# Patient Record
Sex: FEMALE | Race: WHITE | NOT HISPANIC OR LATINO
[De-identification: names, ages, dates, MRNs, and addresses within clinical notes are randomized per-mention and may not be internally consistent; named-entity substitution may affect disease eponyms.]

---

## 2017-05-16 ENCOUNTER — OTHER (OUTPATIENT)
Age: 82
End: 2017-05-16

## 2017-08-02 ENCOUNTER — APPOINTMENT (OUTPATIENT)
Dept: CV DIAGNOSITCS | Facility: HOSPITAL | Age: 82
End: 2017-08-02

## 2017-08-02 ENCOUNTER — APPOINTMENT (OUTPATIENT)
Dept: CARDIOLOGY | Facility: CLINIC | Age: 82
End: 2017-08-02

## 2019-11-19 ENCOUNTER — INPATIENT (INPATIENT)
Facility: HOSPITAL | Age: 84
LOS: 5 days | Discharge: NURSING, REHAB OR LONG-TERM CARE | DRG: 813 | End: 2019-11-25
Attending: INTERNAL MEDICINE | Admitting: INTERNAL MEDICINE
Payer: MEDICARE

## 2019-11-19 VITALS
TEMPERATURE: 98 F | OXYGEN SATURATION: 97 % | WEIGHT: 145.95 LBS | RESPIRATION RATE: 18 BRPM | HEART RATE: 126 BPM | SYSTOLIC BLOOD PRESSURE: 143 MMHG | DIASTOLIC BLOOD PRESSURE: 90 MMHG

## 2019-11-19 LAB
ALBUMIN SERPL ELPH-MCNC: 4.1 G/DL — SIGNIFICANT CHANGE UP (ref 3.3–5)
ALP SERPL-CCNC: 81 U/L — SIGNIFICANT CHANGE UP (ref 40–120)
ALT FLD-CCNC: 25 U/L — SIGNIFICANT CHANGE UP (ref 10–45)
ANION GAP SERPL CALC-SCNC: 11 MMOL/L — SIGNIFICANT CHANGE UP (ref 5–17)
APPEARANCE UR: CLEAR — SIGNIFICANT CHANGE UP
AST SERPL-CCNC: 25 U/L — SIGNIFICANT CHANGE UP (ref 10–40)
BACTERIA # UR AUTO: NEGATIVE — SIGNIFICANT CHANGE UP
BASOPHILS # BLD AUTO: 0.04 K/UL — SIGNIFICANT CHANGE UP (ref 0–0.2)
BASOPHILS NFR BLD AUTO: 0.3 % — SIGNIFICANT CHANGE UP (ref 0–2)
BILIRUB SERPL-MCNC: 0.8 MG/DL — SIGNIFICANT CHANGE UP (ref 0.2–1.2)
BILIRUB UR-MCNC: NEGATIVE — SIGNIFICANT CHANGE UP
BLD GP AB SCN SERPL QL: NEGATIVE — SIGNIFICANT CHANGE UP
BUN SERPL-MCNC: 19 MG/DL — SIGNIFICANT CHANGE UP (ref 7–23)
CALCIUM SERPL-MCNC: 9.7 MG/DL — SIGNIFICANT CHANGE UP (ref 8.4–10.5)
CHLORIDE SERPL-SCNC: 102 MMOL/L — SIGNIFICANT CHANGE UP (ref 96–108)
CO2 SERPL-SCNC: 23 MMOL/L — SIGNIFICANT CHANGE UP (ref 22–31)
COLOR SPEC: YELLOW — SIGNIFICANT CHANGE UP
CREAT SERPL-MCNC: 0.75 MG/DL — SIGNIFICANT CHANGE UP (ref 0.5–1.3)
DIFF PNL FLD: ABNORMAL
EOSINOPHIL # BLD AUTO: 0.19 K/UL — SIGNIFICANT CHANGE UP (ref 0–0.5)
EOSINOPHIL NFR BLD AUTO: 1.4 % — SIGNIFICANT CHANGE UP (ref 0–6)
EPI CELLS # UR: 1 /HPF — SIGNIFICANT CHANGE UP
GAS PNL BLDV: SIGNIFICANT CHANGE UP
GLUCOSE SERPL-MCNC: 295 MG/DL — HIGH (ref 70–99)
GLUCOSE UR QL: ABNORMAL
HCT VFR BLD CALC: 40.3 % — SIGNIFICANT CHANGE UP (ref 34.5–45)
HGB BLD-MCNC: 12.5 G/DL — SIGNIFICANT CHANGE UP (ref 11.5–15.5)
HYALINE CASTS # UR AUTO: 1 /LPF — SIGNIFICANT CHANGE UP (ref 0–2)
IMM GRANULOCYTES NFR BLD AUTO: 0.7 % — SIGNIFICANT CHANGE UP (ref 0–1.5)
KETONES UR-MCNC: NEGATIVE — SIGNIFICANT CHANGE UP
LEUKOCYTE ESTERASE UR-ACNC: NEGATIVE — SIGNIFICANT CHANGE UP
LYMPHOCYTES # BLD AUTO: 1.88 K/UL — SIGNIFICANT CHANGE UP (ref 1–3.3)
LYMPHOCYTES # BLD AUTO: 13.9 % — SIGNIFICANT CHANGE UP (ref 13–44)
MCHC RBC-ENTMCNC: 27.2 PG — SIGNIFICANT CHANGE UP (ref 27–34)
MCHC RBC-ENTMCNC: 31 GM/DL — LOW (ref 32–36)
MCV RBC AUTO: 87.8 FL — SIGNIFICANT CHANGE UP (ref 80–100)
MONOCYTES # BLD AUTO: 0.74 K/UL — SIGNIFICANT CHANGE UP (ref 0–0.9)
MONOCYTES NFR BLD AUTO: 5.5 % — SIGNIFICANT CHANGE UP (ref 2–14)
NEUTROPHILS # BLD AUTO: 10.62 K/UL — HIGH (ref 1.8–7.4)
NEUTROPHILS NFR BLD AUTO: 78.2 % — HIGH (ref 43–77)
NITRITE UR-MCNC: NEGATIVE — SIGNIFICANT CHANGE UP
NRBC # BLD: 0 /100 WBCS — SIGNIFICANT CHANGE UP (ref 0–0)
PH UR: 5.5 — SIGNIFICANT CHANGE UP (ref 5–8)
PLATELET # BLD AUTO: 239 K/UL — SIGNIFICANT CHANGE UP (ref 150–400)
POTASSIUM SERPL-MCNC: 4.5 MMOL/L — SIGNIFICANT CHANGE UP (ref 3.5–5.3)
POTASSIUM SERPL-SCNC: 4.5 MMOL/L — SIGNIFICANT CHANGE UP (ref 3.5–5.3)
PROT SERPL-MCNC: 7.2 G/DL — SIGNIFICANT CHANGE UP (ref 6–8.3)
PROT UR-MCNC: ABNORMAL
RBC # BLD: 4.59 M/UL — SIGNIFICANT CHANGE UP (ref 3.8–5.2)
RBC # FLD: 14.6 % — HIGH (ref 10.3–14.5)
RBC CASTS # UR COMP ASSIST: 1 /HPF — SIGNIFICANT CHANGE UP (ref 0–4)
RH IG SCN BLD-IMP: POSITIVE — SIGNIFICANT CHANGE UP
SODIUM SERPL-SCNC: 136 MMOL/L — SIGNIFICANT CHANGE UP (ref 135–145)
SP GR SPEC: 1.02 — SIGNIFICANT CHANGE UP (ref 1.01–1.02)
UROBILINOGEN FLD QL: NEGATIVE — SIGNIFICANT CHANGE UP
WBC # BLD: 13.56 K/UL — HIGH (ref 3.8–10.5)
WBC # FLD AUTO: 13.56 K/UL — HIGH (ref 3.8–10.5)
WBC UR QL: 0 /HPF — SIGNIFICANT CHANGE UP (ref 0–5)

## 2019-11-19 PROCEDURE — 70450 CT HEAD/BRAIN W/O DYE: CPT | Mod: 26

## 2019-11-19 PROCEDURE — 71045 X-RAY EXAM CHEST 1 VIEW: CPT | Mod: 26

## 2019-11-19 PROCEDURE — 74177 CT ABD & PELVIS W/CONTRAST: CPT | Mod: 26

## 2019-11-19 PROCEDURE — 99285 EMERGENCY DEPT VISIT HI MDM: CPT | Mod: GC

## 2019-11-19 RX ORDER — SODIUM CHLORIDE 9 MG/ML
1000 INJECTION INTRAMUSCULAR; INTRAVENOUS; SUBCUTANEOUS ONCE
Refills: 0 | Status: COMPLETED | OUTPATIENT
Start: 2019-11-19 | End: 2019-11-19

## 2019-11-19 RX ADMIN — SODIUM CHLORIDE 1000 MILLILITER(S): 9 INJECTION INTRAMUSCULAR; INTRAVENOUS; SUBCUTANEOUS at 21:37

## 2019-11-19 NOTE — ED ADULT NURSE NOTE - NSIMPLEMENTINTERV_GEN_ALL_ED
Implemented All Fall with Harm Risk Interventions:  Railroad to call system. Call bell, personal items and telephone within reach. Instruct patient to call for assistance. Room bathroom lighting operational. Non-slip footwear when patient is off stretcher. Physically safe environment: no spills, clutter or unnecessary equipment. Stretcher in lowest position, wheels locked, appropriate side rails in place. Provide visual cue, wrist band, yellow gown, etc. Monitor gait and stability. Monitor for mental status changes and reorient to person, place, and time. Review medications for side effects contributing to fall risk. Reinforce activity limits and safety measures with patient and family. Provide visual clues: red socks.

## 2019-11-19 NOTE — ED PROVIDER NOTE - CLINICAL SUMMARY MEDICAL DECISION MAKING FREE TEXT BOX
Dr. Polk Note: generalized weakness, fall, ab pain, check ct ab r/o bleed, r/o ICH, metabolic screen for weakness.

## 2019-11-19 NOTE — ED PROVIDER NOTE - PHYSICAL EXAMINATION
Al RODRIGUEZ MD PGY2:   PHYSICAL EXAM:    GENERAL: NAD, well-developed  HEENT:  Atraumatic, Normocephalic  CHEST/LUNG: Chest rise equal bilaterally  HEART: Regular rate and rhythm  ABDOMEN: Lower abdominal TTP LLQ maximally.   EXTREMITIES:  2+ Peripheral Pulses.  PSYCH: A&Ox3  SKIN: No obvious rashes or lesions

## 2019-11-19 NOTE — ED PROVIDER NOTE - ATTENDING CONTRIBUTION TO CARE
Pt here with family with generalized weakness and ab pain, also fell, on a/c.  Appears elderly, nontoxic, clear lungs, minimal tenderness lower abdomen.  Nonfocal neuro exam.

## 2019-11-19 NOTE — ED ADULT NURSE NOTE - OBJECTIVE STATEMENT
89 YO female PMHx A-fib on eliquis, HTN, c/o generalized weakness X1 week and rectal bleeding. Patient reports that she went to her PCP for gross amounts of blood in BM's as reported by aid. Patient reports that she has been feeling as thought her knees have been buckling and reports decreased ambulation. Of note, patient had fall last week, does not recall head trauma or LOC but did stay on floor for 1 day until neighbor found her. Patient is A&OX3, airway patent, breathing spontaneous, equal and symmetric chest rise and fall. skin warm, dry and pink. denies chest pain, SOB, HA, N/V/D, abdominal pain, fever/chills, urinary symptoms, hematuria, weakness, dizziness, numbness, tinging. Peripheral pulses present b/l. Skin warm, dry and pink. Pt placed in position of comfort. Pt educated on call bell system and provided call bell. Bed in lowest position, wheels locked, appropriate side rails raised. Pt denies needs at this time.

## 2019-11-19 NOTE — ED PROVIDER NOTE - OBJECTIVE STATEMENT
Al RODRIGUEZ MD PGY2: 89 F PMH Afib eliquis here for generalized weakness x 1 week with positive FOBT at PCP office today; after mechanical fall last week where she was on the ground for a day and a half and was cleaned up by a neighbour. No infectious sxs. No head trauma during that fall. No LOC. Was in PCP's office today for 2 episodes of grossly bloody bowel movements and has low H&H at baseline. Patient feels weak and does not feel safe at home.       Meds: eliquis 5 BID   metoprolol 100 am 50 pm   omeprazole   glipizide 5 BID

## 2019-11-19 NOTE — ED PROVIDER NOTE - PROGRESS NOTE DETAILS
Sign out follow-up: CTH/CTCS no acute process plan was to dc home on Abx for UTI. Daughter not answering phone. Spoke to brother and sister in law who elderly and unable to come to hospital tonight. Will try to reach family in [place]. Pt's niece calls back. Pt lives alone with health aid a few hours per week. Ambulates with walker. Came to ED mainly for blood in stool (stool dark and fecal occult + in PCP's office). Pt will need admission to w/u GIB (HD stable in ED o/n). TAO Attending note (Sunny): patient admitted to unattached service as no callback from PCP after several attempts to reach.  remains hemodynamically stable.  per information from signout, not c/w sepsis.

## 2019-11-19 NOTE — ED PROVIDER NOTE - SEVERE SEPSIS ALERT DETAILS
Attending note (Sunny): At this time, a diagnosis of sepsis is not supported by the overall clinical picture.

## 2019-11-20 DIAGNOSIS — K92.2 GASTROINTESTINAL HEMORRHAGE, UNSPECIFIED: ICD-10-CM

## 2019-11-20 DIAGNOSIS — E11.9 TYPE 2 DIABETES MELLITUS WITHOUT COMPLICATIONS: ICD-10-CM

## 2019-11-20 DIAGNOSIS — I48.91 UNSPECIFIED ATRIAL FIBRILLATION: ICD-10-CM

## 2019-11-20 LAB
CK SERPL-CCNC: 22 U/L — LOW (ref 25–170)
GLUCOSE BLDC GLUCOMTR-MCNC: 189 MG/DL — HIGH (ref 70–99)
GLUCOSE BLDC GLUCOMTR-MCNC: 270 MG/DL — HIGH (ref 70–99)
HCT VFR BLD CALC: 39.4 % — SIGNIFICANT CHANGE UP (ref 34.5–45)
HGB BLD-MCNC: 12.1 G/DL — SIGNIFICANT CHANGE UP (ref 11.5–15.5)
MCHC RBC-ENTMCNC: 27.3 PG — SIGNIFICANT CHANGE UP (ref 27–34)
MCHC RBC-ENTMCNC: 30.7 GM/DL — LOW (ref 32–36)
MCV RBC AUTO: 88.9 FL — SIGNIFICANT CHANGE UP (ref 80–100)
PLATELET # BLD AUTO: 225 K/UL — SIGNIFICANT CHANGE UP (ref 150–400)
RBC # BLD: 4.43 M/UL — SIGNIFICANT CHANGE UP (ref 3.8–5.2)
RBC # FLD: 14.6 % — HIGH (ref 10.3–14.5)
TROPONIN T, HIGH SENSITIVITY RESULT: 24 NG/L — SIGNIFICANT CHANGE UP (ref 0–51)
WBC # BLD: 10.83 K/UL — HIGH (ref 3.8–10.5)
WBC # FLD AUTO: 10.83 K/UL — HIGH (ref 3.8–10.5)

## 2019-11-20 RX ORDER — INFLUENZA VIRUS VACCINE 15; 15; 15; 15 UG/.5ML; UG/.5ML; UG/.5ML; UG/.5ML
0.5 SUSPENSION INTRAMUSCULAR ONCE
Refills: 0 | Status: COMPLETED | OUTPATIENT
Start: 2019-11-20 | End: 2019-11-20

## 2019-11-20 RX ORDER — GLUCAGON INJECTION, SOLUTION 0.5 MG/.1ML
1 INJECTION, SOLUTION SUBCUTANEOUS ONCE
Refills: 0 | Status: DISCONTINUED | OUTPATIENT
Start: 2019-11-20 | End: 2019-11-25

## 2019-11-20 RX ORDER — METOPROLOL TARTRATE 50 MG
25 TABLET ORAL THREE TIMES A DAY
Refills: 0 | Status: DISCONTINUED | OUTPATIENT
Start: 2019-11-20 | End: 2019-11-23

## 2019-11-20 RX ORDER — PANTOPRAZOLE SODIUM 20 MG/1
40 TABLET, DELAYED RELEASE ORAL
Refills: 0 | Status: DISCONTINUED | OUTPATIENT
Start: 2019-11-20 | End: 2019-11-25

## 2019-11-20 RX ORDER — ACETAMINOPHEN 500 MG
650 TABLET ORAL ONCE
Refills: 0 | Status: COMPLETED | OUTPATIENT
Start: 2019-11-20 | End: 2019-11-20

## 2019-11-20 RX ORDER — CEFTRIAXONE 500 MG/1
1000 INJECTION, POWDER, FOR SOLUTION INTRAMUSCULAR; INTRAVENOUS ONCE
Refills: 0 | Status: COMPLETED | OUTPATIENT
Start: 2019-11-20 | End: 2019-11-20

## 2019-11-20 RX ORDER — DEXTROSE 50 % IN WATER 50 %
25 SYRINGE (ML) INTRAVENOUS ONCE
Refills: 0 | Status: DISCONTINUED | OUTPATIENT
Start: 2019-11-20 | End: 2019-11-25

## 2019-11-20 RX ORDER — SODIUM CHLORIDE 9 MG/ML
1000 INJECTION, SOLUTION INTRAVENOUS
Refills: 0 | Status: DISCONTINUED | OUTPATIENT
Start: 2019-11-20 | End: 2019-11-25

## 2019-11-20 RX ORDER — DEXTROSE 50 % IN WATER 50 %
15 SYRINGE (ML) INTRAVENOUS ONCE
Refills: 0 | Status: DISCONTINUED | OUTPATIENT
Start: 2019-11-20 | End: 2019-11-25

## 2019-11-20 RX ORDER — DEXTROSE 50 % IN WATER 50 %
12.5 SYRINGE (ML) INTRAVENOUS ONCE
Refills: 0 | Status: DISCONTINUED | OUTPATIENT
Start: 2019-11-20 | End: 2019-11-25

## 2019-11-20 RX ORDER — INSULIN LISPRO 100/ML
VIAL (ML) SUBCUTANEOUS
Refills: 0 | Status: DISCONTINUED | OUTPATIENT
Start: 2019-11-20 | End: 2019-11-25

## 2019-11-20 RX ORDER — PANTOPRAZOLE SODIUM 20 MG/1
40 TABLET, DELAYED RELEASE ORAL ONCE
Refills: 0 | Status: COMPLETED | OUTPATIENT
Start: 2019-11-20 | End: 2019-11-20

## 2019-11-20 RX ORDER — INSULIN LISPRO 100/ML
VIAL (ML) SUBCUTANEOUS AT BEDTIME
Refills: 0 | Status: DISCONTINUED | OUTPATIENT
Start: 2019-11-20 | End: 2019-11-25

## 2019-11-20 RX ADMIN — Medication 25 MILLIGRAM(S): at 21:25

## 2019-11-20 RX ADMIN — Medication 3: at 18:02

## 2019-11-20 RX ADMIN — Medication 650 MILLIGRAM(S): at 16:01

## 2019-11-20 RX ADMIN — PANTOPRAZOLE SODIUM 40 MILLIGRAM(S): 20 TABLET, DELAYED RELEASE ORAL at 07:00

## 2019-11-20 RX ADMIN — CEFTRIAXONE 100 MILLIGRAM(S): 500 INJECTION, POWDER, FOR SOLUTION INTRAMUSCULAR; INTRAVENOUS at 07:01

## 2019-11-20 RX ADMIN — Medication 650 MILLIGRAM(S): at 16:30

## 2019-11-20 NOTE — H&P ADULT - NSHPLABSRESULTS_GEN_ALL_CORE
Lab Results:  CBC  CBC Full  -  ( 2019 20:57 )  WBC Count : 13.56 K/uL  RBC Count : 4.59 M/uL  Hemoglobin : 12.5 g/dL  Hematocrit : 40.3 %  Platelet Count - Automated : 239 K/uL  Mean Cell Volume : 87.8 fl  Mean Cell Hemoglobin : 27.2 pg  Mean Cell Hemoglobin Concentration : 31.0 gm/dL  Auto Neutrophil # : 10.62 K/uL  Auto Lymphocyte # : 1.88 K/uL  Auto Monocyte # : 0.74 K/uL  Auto Eosinophil # : 0.19 K/uL  Auto Basophil # : 0.04 K/uL  Auto Neutrophil % : 78.2 %  Auto Lymphocyte % : 13.9 %  Auto Monocyte % : 5.5 %  Auto Eosinophil % : 1.4 %  Auto Basophil % : 0.3 %    .		Differential:	[] Automated		[] Manual  Chemistry                        12.5   13.56 )-----------( 239      ( 2019 20:57 )             40.3     11-    136  |  102  |  19  ----------------------------<  295<H>  4.5   |  23  |  0.75    Ca    9.7      2019 20:57    TPro  7.2  /  Alb  4.1  /  TBili  0.8  /  DBili  x   /  AST  25  /  ALT  25  /  AlkPhos  81  11-19    LIVER FUNCTIONS - ( 2019 20:57 )  Alb: 4.1 g/dL / Pro: 7.2 g/dL / ALK PHOS: 81 U/L / ALT: 25 U/L / AST: 25 U/L / GGT: x             Urinalysis Basic - ( 2019 21:31 )    Color: Yellow / Appearance: Clear / S.024 / pH: x  Gluc: x / Ketone: Negative  / Bili: Negative / Urobili: Negative   Blood: x / Protein: 30 mg/dL / Nitrite: Negative   Leuk Esterase: Negative / RBC: 1 /hpf / WBC 0 /HPF   Sq Epi: x / Non Sq Epi: 1 /hpf / Bacteria: Negative            MICROBIOLOGY/CULTURES:      RADIOLOGY RESULTS: reviewed

## 2019-11-20 NOTE — CONSULT NOTE ADULT - ASSESSMENT
afib   Hr a bit elevated   will resume low dose BB  a/c on hold given GIB    HTN  stable    GIB  plan for EGD  cv stable to proceed   Monitor hemoglobin, transfuse as needed.

## 2019-11-20 NOTE — H&P ADULT - ASSESSMENT
89 F PMH Afib eliquis here for generalized weakness x 1 week with positive FOBT at PCP office today; after mechanical fall last week where she was on the ground for a day and a half and was cleaned up by a neighbour. No infectious sxs. No head trauma during that fall. No LOC. Was in PCP's office today for 2 episodes of grossly bloody bowel movements and has low H&H at baseline. Patient feels weak and does not feel safe at home.

## 2019-11-20 NOTE — ED ADULT NURSE REASSESSMENT NOTE - NS ED NURSE REASSESS COMMENT FT1
Patient a&ox3, no acute distress, resp nonlabored, resting comfortably in stretcher. Denies headache, dizziness, chest pain, palpitations, SOB, abd pain, N/V/D, urinary symptoms, fevers, chills, weakness at this time. Patient awaiting social work in AM. Pt placed in position of comfort. Pt educated on call bell system and provided call bell. Non-skid socks on, bed in lowest position, wheels locked, appropriate side rails raised. Pt denies needs at this time.

## 2019-11-20 NOTE — CONSULT NOTE ADULT - SUBJECTIVE AND OBJECTIVE BOX
[facility]        Patient is a 89y old  Female who presents with a chief complaint of weakness (2019 17:25)    Excerpt from H&P,  89 F PMH Afkatelyn anayeli here for generalized weakness x 1 week with positive FOBT at PCP office today; after mechanical fall last week where she was on the ground for a day and a half and was cleaned up by a neighbour. No infectious sxs. No head trauma during that fall. No LOC. Was in PCP's office today for 2 episodes of grossly bloody bowel movements and has low H&H at baseline. Patient feels weak and does not feel safe at home.           *****PAST MEDICAL / Surgical  HISTORY:  PAST MEDICAL & SURGICAL HISTORY:  Coronary artery disease  Stented coronary artery  Atrial fibrillation  Diabetes  No significant past surgical history           *****FAMILY HISTORY:  FAMILY HISTORY:  No pertinent family history in first degree relatives           *****SOCIAL HISTORY:  Alcohol: None  Smoking: None  walks with a walker.       *****ALLERGIES:   Allergies    No Known Allergies    Intolerances             *****MEDICATIONS: current medication reviewed and documented.   MEDICATIONS  (STANDING):  dextrose 5%. 1000 milliLiter(s) (50 mL/Hr) IV Continuous <Continuous>  dextrose 50% Injectable 12.5 Gram(s) IV Push once  dextrose 50% Injectable 25 Gram(s) IV Push once  dextrose 50% Injectable 25 Gram(s) IV Push once  influenza   Vaccine 0.5 milliLiter(s) IntraMuscular once  insulin lispro (HumaLOG) corrective regimen sliding scale   SubCutaneous at bedtime  insulin lispro (HumaLOG) corrective regimen sliding scale   SubCutaneous three times a day before meals  metoprolol tartrate 25 milliGRAM(s) Oral three times a day  pantoprazole    Tablet 40 milliGRAM(s) Oral before breakfast    MEDICATIONS  (PRN):  dextrose 40% Gel 15 Gram(s) Oral once PRN Blood Glucose LESS THAN 70 milliGRAM(s)/deciliter  glucagon  Injectable 1 milliGRAM(s) IntraMuscular once PRN Glucose LESS THAN 70 milligrams/deciliter           *****REVIEW OF SYSTEM:  GEN: no fever, no chills, no pain  RESP: no SOB, no cough, no sputum  CVS: no chest pain, no palpitations, no edema  GI: no abdominal pain, no nausea, no vomiting, no constipation, no diarrhea  : no dysurea, no frequency, no hematurea  Neuro: no headache, no dizziness  PSYCH: no anxiety, no depression  Derm : no itching, no rash         *****VITAL SIGNS:  T(C): 37 (19 @ 21:21), Max: 37 (19 @ 21:21)  HR: 96 (19 @ 21:21) (87 - 111)  BP: 123/87 (19 @ 21:21) (111/88 - 156/82)  RR: 18 (19 @ 21:21) (16 - 18)  SpO2: 98% (19 @ 21:21) (95% - 99%)  Wt(kg): --           *****PHYSICAL EXAM:   Alert oriented x 2  Attention comprehension are fair. Able to name, repeat, without any difficulty.   Able to follow 1-2 step commands.     EOMI fundi not visualized,  VFF to confrontration  No facial asymmetry   Tongue is midline   Palate elevates symmetrically   Moving both upper ext symmetrically antigravity   b/l Iliopsoas 2/5   hamstrings 3/5 quad 3/5  dorsiflexion 3/5   patchy areas of numbness.  Gait : not assessed.  B/L down going toes               *****LAB AND IMAGIN.1   10.83 )-----------( 225      ( 2019 18:21 )             39.4               11-    136  |  102  |  19  ----------------------------<  295<H>  4.5   |  23  |  0.75    Ca    9.7      2019 20:57    TPro  7.2  /  Alb  4.1  /  TBili  0.8  /  DBili  x   /  AST  25  /  ALT  25  /  AlkPhos  81  11-19                CARDIAC MARKERS ( 2019 16:21 )  x     / x     / 22 U/L / x     / x                  Urinalysis Basic - ( 2019 21:31 )    Color: Yellow / Appearance: Clear / S.024 / pH: x  Gluc: x / Ketone: Negative  / Bili: Negative / Urobili: Negative   Blood: x / Protein: 30 mg/dL / Nitrite: Negative   Leuk Esterase: Negative / RBC: 1 /hpf / WBC 0 /HPF   Sq Epi: x / Non Sq Epi: 1 /hpf / Bacteria: Negative  < from: CT Head No Cont (11.19.19 @ 22:15) >  There is no acute intracranial hemorrhage, mass effect, midline shift,   extra-axial collection, hydrocephalus, or evidence of acute vascular   territorial infarction. Mild patchy hypodensities within the   periventricular and subcortical white matter, although nonspecific,   likely reflect chronic microvascular disease. Cerebral volume loss   results in prominence of the ventricles and sulci.    The visualized paranasal sinuses and mastoid air cells are clear.   Hyperostosis frontalis interna. No calvarial fracture.    IMPRESSION:     No acute intracranial hemorrhage or calvarial fracture.      < end of copied text >        [All pertinent recent Imaging reports reviewed]         *****A S S E S S M E N T   A N D   P L A N :89 F PMH Afib eliquis here for generalized weakness x 1 week with positive FOBT at PCP office today; after mechanical fall last week where she was on the ground for a day and a half and was cleaned up by a neighbour. No infectious sxs. No head trauma during that fall. No LOC. Was in PCP's office today for 2 episodes of grossly bloody bowel movements and has low H&H at baseline. Patient feels weak and does not feel safe at home.        Problem/Recommendations 1:  fall mechanical vs. progressive deconditioning vs. microvascular disease   ct head c/w mild microvascular disease   pt eval may benefit from short term rehab   home safety eval  social work consult for home health aide   recommend alert bracelet     Problem/Recommendations 2:afib on eliquis with GIB   defer to cardio to eval need for ac	       ___________________________  Will follow with you.  Thank you,  Luana Frederick MD  Diplomate of the American Board of Neurology and Psychiatry.  Diplomate of the American Board of Vascular Neurology.   [facility]   Ph: [phone]    Differential diagnosis and plan of care discussed with patient after the evaluation.   Advanced care planning options discussed.   Pain assessed and judicious use of narcotics when appropriate was discussed.  Importance of Fall prevention discussed.  Counseling on Smoking and Alcohol cessation was offered when appropriate.  Counseling on Diet, exercise, and medication compliance was done.     123 minutes spent on the total encounter;  more than 50 % of the visit was spent on counseling  and or coordinating care by the attending physician.    Thank you for allowing me to participate in the care of this chelly patient. Please do not hesitate to call me if you have any questions.     This and subsequent notes were partially created using voice recognition software and will  inherently be subject to errors including those of syntax and sound alike substitutions which may escape proofreading. In such instances original meaning may be extrapolated by contextual derivation.

## 2019-11-20 NOTE — CONSULT NOTE ADULT - SUBJECTIVE AND OBJECTIVE BOX
[facility]  Jared Melvin PA-C  [address] [place] [id number]  [phone]      Chief Complaint:  Patient is a 89y old  Female who presents with a chief complaint of weakness (2019 12:37)      HPI:89 F PMH Afib eliquis here for generalized weakness x 1 week with positive FOBT at PCP office today; after mechanical fall last week where she was on the ground for a day and a half and was cleaned up by a neighbour. No infectious sxs. No head trauma during that fall. No LOC. Was in PCP's office today for 2 episodes of grossly bloody bowel movements and has low H&H at baseline. Patient feels weak and does not feel safe at home.     Allergies:  No Known Allergies      Medications:  acetaminophen   Tablet .. 650 milliGRAM(s) Oral once  dextrose 40% Gel 15 Gram(s) Oral once PRN  dextrose 5%. 1000 milliLiter(s) IV Continuous <Continuous>  dextrose 50% Injectable 12.5 Gram(s) IV Push once  dextrose 50% Injectable 25 Gram(s) IV Push once  dextrose 50% Injectable 25 Gram(s) IV Push once  glucagon  Injectable 1 milliGRAM(s) IntraMuscular once PRN  insulin lispro (HumaLOG) corrective regimen sliding scale   SubCutaneous three times a day before meals  pantoprazole    Tablet 40 milliGRAM(s) Oral before breakfast      PMHX/PSHX:  Coronary artery disease  Stented coronary artery  Atrial fibrillation  Diabetes  No significant past surgical history      Family history:  No pertinent family history in first degree relatives      Social History:     ROS:     General:  No wt loss, fevers, chills, night sweats, fatigue,   Eyes:  Good vision, no reported pain  ENT:  No sore throat, pain, runny nose, dysphagia  CV:  No pain, palpitations, hypo/hypertension  Resp:  No dyspnea, cough, tachypnea, wheezing  GI:  No pain, No nausea, No vomiting, No diarrhea, No constipation, No weight loss, No fever, No pruritis, No rectal bleeding, + tarry stools, No dysphagia,  :  No pain, bleeding, incontinence, nocturia  Muscle:  No pain, weakness  Neuro:  No weakness, tingling, memory problems  Psych:  No fatigue, insomnia, mood problems, depression  Endocrine:  No polyuria, polydipsia, cold/heat intolerance  Heme:  No petechiae, ecchymosis, easy bruisability  Skin:  No rash, tattoos, scars, edema      PHYSICAL EXAM:   Vital Signs:  Vital Signs Last 24 Hrs  T(C): 36.6 (2019 14:40), Max: 36.9 (2019 07:15)  T(F): 97.9 (2019 14:40), Max: 98.5 (2019 07:15)  HR: 111 (2019 14:40) (87 - 126)  BP: 111/88 (2019 14:40) (111/88 - 156/82)  BP(mean): --  RR: 16 (2019 14:40) (16 - 18)  SpO2: 98% (2019 14:40) (95% - 99%)  Daily     Daily     GENERAL:  Appears stated age, well-groomed, well-nourished, no distress  HEENT:  NC/AT,  conjunctivae clear and pink, no thyromegaly, nodules, adenopathy, no JVD, sclera -anicteric  CHEST:  Full & symmetric excursion, no increased effort, breath sounds clear  HEART:  Regular rhythm, S1, S2, no murmur/rub/S3/S4, no abdominal bruit, no edema  ABDOMEN:  Soft, non-tender, non-distended, normoactive bowel sounds,  no masses ,no hepato-splenomegaly, no signs of chronic liver disease  EXTEREMITIES:  no cyanosis,clubbing or edema  SKIN:  No rash/erythema/ecchymoses/petechiae/wounds/abscess/warm/dry  NEURO:  Alert, oriented, no asterixis, no tremor, no encephalopathy    LABS:                        12.5   13.56 )-----------( 239      ( 2019 20:57 )             40.3         136  |  102  |  19  ----------------------------<  295<H>  4.5   |  23  |  0.75    Ca    9.7      2019 20:57    TPro  7.2  /  Alb  4.1  /  TBili  0.8  /  DBili  x   /  AST  25  /  ALT  25  /  AlkPhos  81  11-19    LIVER FUNCTIONS - ( 2019 20:57 )  Alb: 4.1 g/dL / Pro: 7.2 g/dL / ALK PHOS: 81 U/L / ALT: 25 U/L / AST: 25 U/L / GGT: x             Urinalysis Basic - ( 2019 21:31 )    Color: Yellow / Appearance: Clear / S.024 / pH: x  Gluc: x / Ketone: Negative  / Bili: Negative / Urobili: Negative   Blood: x / Protein: 30 mg/dL / Nitrite: Negative   Leuk Esterase: Negative / RBC: 1 /hpf / WBC 0 /HPF   Sq Epi: x / Non Sq Epi: 1 /hpf / Bacteria: Negative          Imaging:

## 2019-11-20 NOTE — H&P ADULT - NSICDXPASTMEDICALHX_GEN_ALL_CORE_FT
PAST MEDICAL HISTORY:  Atrial fibrillation     Coronary artery disease     Diabetes     Stented coronary artery

## 2019-11-20 NOTE — CONSULT NOTE ADULT - SUBJECTIVE AND OBJECTIVE BOX
CHIEF COMPLAINT:Patient is a 89y old  Female who presents with a chief complaint of weakness (20 Nov 2019 15:13)      HISTORY OF PRESENT ILLNESS:      89 year old female with afib on Eliquis , weakness   found to have pos FOBT, melena   planned for EGD   denies any chest pain or sob     PAST MEDICAL & SURGICAL HISTORY:  Coronary artery disease  Stented coronary artery  Atrial fibrillation  Diabetes  No significant past surgical history          MEDICATIONS:          pantoprazole    Tablet 40 milliGRAM(s) Oral before breakfast    dextrose 40% Gel 15 Gram(s) Oral once PRN  dextrose 50% Injectable 12.5 Gram(s) IV Push once  dextrose 50% Injectable 25 Gram(s) IV Push once  dextrose 50% Injectable 25 Gram(s) IV Push once  glucagon  Injectable 1 milliGRAM(s) IntraMuscular once PRN  insulin lispro (HumaLOG) corrective regimen sliding scale   SubCutaneous three times a day before meals    dextrose 5%. 1000 milliLiter(s) IV Continuous <Continuous>      FAMILY HISTORY:  No pertinent family history in first degree relatives      Non-contributory    SOCIAL HISTORY:    No tobacco, drugs or etoh    Allergies    No Known Allergies    Intolerances    	    REVIEW OF SYSTEMS:  as above  The rest of the 14 points ROS reviewed and except above they are unremarkable.        PHYSICAL EXAM:  T(C): 36.6 (11-20-19 @ 14:40), Max: 36.9 (11-20-19 @ 07:15)  HR: 111 (11-20-19 @ 14:40) (87 - 126)  BP: 111/88 (11-20-19 @ 14:40) (111/88 - 156/82)  RR: 16 (11-20-19 @ 14:40) (16 - 18)  SpO2: 98% (11-20-19 @ 14:40) (95% - 99%)  Wt(kg): --  I&O's Summary    JVP: Normal  Neck: supple  Lung: clear   CV: S1 S2 , Murmur:  Abd: soft  Ext: No edema  neuro: Awake / alert  Psych: flat affect  Skin: normal      LABS/DATA:    TELEMETRY: 	    ECG:  	afib    	  CARDIAC MARKERS:                        24 <<== 11-20-19 @ 16:21                              12.5   13.56 )-----------( 239      ( 19 Nov 2019 20:57 )             40.3     11-19    136  |  102  |  19  ----------------------------<  295<H>  4.5   |  23  |  0.75    Ca    9.7      19 Nov 2019 20:57    TPro  7.2  /  Alb  4.1  /  TBili  0.8  /  DBili  x   /  AST  25  /  ALT  25  /  AlkPhos  81  11-19    proBNP:   Lipid Profile:   HgA1c:   TSH:

## 2019-11-20 NOTE — H&P ADULT - NSHPPHYSICALEXAM_GEN_ALL_CORE
General: WN/WD NAD  PERRLA  Neurology: A&Ox3, nonfocal, AG x 4  Respiratory: CTA B/L  CV: RRR, S1S2, no murmurs, rubs or gallops  Abdominal: Soft, NT, ND +BS, Last BM  Extremities: No edema, + peripheral pulses  Skin Normal

## 2019-11-20 NOTE — CONSULT NOTE ADULT - ASSESSMENT
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 marginal ulcer  hold a/c  iv proton pump inhibitor  plan for  upper gastrointestinal endoscopy in am  npo p mn  d/w family  cardiology eval pending

## 2019-11-20 NOTE — ED ADULT NURSE REASSESSMENT NOTE - NS ED NURSE REASSESS COMMENT FT1
vss; pt without complaints; pt still in street clothes; changed into hospital gown; diaper changed; comfort measures taken; pt appears to be slurring words; unknown if baseline; safety/comfort maintained vss; pt without complaints; pt still in street clothes; changed into hospital gown; diaper changed; comfort measures taken; pt concerned about being discharged; adv pt being admitted; safety/comfort maintained

## 2019-11-21 ENCOUNTER — TRANSCRIPTION ENCOUNTER (OUTPATIENT)
Age: 84
End: 2019-11-21

## 2019-11-21 ENCOUNTER — RESULT REVIEW (OUTPATIENT)
Age: 84
End: 2019-11-21

## 2019-11-21 LAB
ANION GAP SERPL CALC-SCNC: 13 MMOL/L — SIGNIFICANT CHANGE UP (ref 5–17)
BUN SERPL-MCNC: 18 MG/DL — SIGNIFICANT CHANGE UP (ref 7–23)
CALCIUM SERPL-MCNC: 8.9 MG/DL — SIGNIFICANT CHANGE UP (ref 8.4–10.5)
CHLORIDE SERPL-SCNC: 104 MMOL/L — SIGNIFICANT CHANGE UP (ref 96–108)
CO2 SERPL-SCNC: 24 MMOL/L — SIGNIFICANT CHANGE UP (ref 22–31)
CREAT SERPL-MCNC: 0.7 MG/DL — SIGNIFICANT CHANGE UP (ref 0.5–1.3)
GLUCOSE BLDC GLUCOMTR-MCNC: 135 MG/DL — HIGH (ref 70–99)
GLUCOSE BLDC GLUCOMTR-MCNC: 152 MG/DL — HIGH (ref 70–99)
GLUCOSE BLDC GLUCOMTR-MCNC: 186 MG/DL — HIGH (ref 70–99)
GLUCOSE BLDC GLUCOMTR-MCNC: 209 MG/DL — HIGH (ref 70–99)
GLUCOSE SERPL-MCNC: 188 MG/DL — HIGH (ref 70–99)
HBA1C BLD-MCNC: 8.2 % — HIGH (ref 4–5.6)
HCT VFR BLD CALC: 35.7 % — SIGNIFICANT CHANGE UP (ref 34.5–45)
HCT VFR BLD CALC: 36.2 % — SIGNIFICANT CHANGE UP (ref 34.5–45)
HCT VFR BLD CALC: 39.7 % — SIGNIFICANT CHANGE UP (ref 34.5–45)
HGB BLD-MCNC: 11.2 G/DL — LOW (ref 11.5–15.5)
HGB BLD-MCNC: 11.4 G/DL — LOW (ref 11.5–15.5)
HGB BLD-MCNC: 12.2 G/DL — SIGNIFICANT CHANGE UP (ref 11.5–15.5)
MCHC RBC-ENTMCNC: 27 PG — SIGNIFICANT CHANGE UP (ref 27–34)
MCHC RBC-ENTMCNC: 27.5 PG — SIGNIFICANT CHANGE UP (ref 27–34)
MCHC RBC-ENTMCNC: 27.9 PG — SIGNIFICANT CHANGE UP (ref 27–34)
MCHC RBC-ENTMCNC: 30.7 GM/DL — LOW (ref 32–36)
MCHC RBC-ENTMCNC: 30.9 GM/DL — LOW (ref 32–36)
MCHC RBC-ENTMCNC: 31.9 GM/DL — LOW (ref 32–36)
MCV RBC AUTO: 87.3 FL — SIGNIFICANT CHANGE UP (ref 80–100)
MCV RBC AUTO: 87.8 FL — SIGNIFICANT CHANGE UP (ref 80–100)
MCV RBC AUTO: 88.9 FL — SIGNIFICANT CHANGE UP (ref 80–100)
NRBC # BLD: 0 /100 WBCS — SIGNIFICANT CHANGE UP (ref 0–0)
NRBC # BLD: 0 /100 WBCS — SIGNIFICANT CHANGE UP (ref 0–0)
PLATELET # BLD AUTO: 193 K/UL — SIGNIFICANT CHANGE UP (ref 150–400)
PLATELET # BLD AUTO: 209 K/UL — SIGNIFICANT CHANGE UP (ref 150–400)
PLATELET # BLD AUTO: 233 K/UL — SIGNIFICANT CHANGE UP (ref 150–400)
POTASSIUM SERPL-MCNC: 4.1 MMOL/L — SIGNIFICANT CHANGE UP (ref 3.5–5.3)
POTASSIUM SERPL-SCNC: 4.1 MMOL/L — SIGNIFICANT CHANGE UP (ref 3.5–5.3)
RBC # BLD: 4.07 M/UL — SIGNIFICANT CHANGE UP (ref 3.8–5.2)
RBC # BLD: 4.09 M/UL — SIGNIFICANT CHANGE UP (ref 3.8–5.2)
RBC # BLD: 4.52 M/UL — SIGNIFICANT CHANGE UP (ref 3.8–5.2)
RBC # FLD: 14.6 % — HIGH (ref 10.3–14.5)
RBC # FLD: 14.6 % — HIGH (ref 10.3–14.5)
RBC # FLD: 14.7 % — HIGH (ref 10.3–14.5)
SODIUM SERPL-SCNC: 141 MMOL/L — SIGNIFICANT CHANGE UP (ref 135–145)
WBC # BLD: 8.29 K/UL — SIGNIFICANT CHANGE UP (ref 3.8–10.5)
WBC # BLD: 8.39 K/UL — SIGNIFICANT CHANGE UP (ref 3.8–10.5)
WBC # BLD: 9.69 K/UL — SIGNIFICANT CHANGE UP (ref 3.8–10.5)
WBC # FLD AUTO: 8.29 K/UL — SIGNIFICANT CHANGE UP (ref 3.8–10.5)
WBC # FLD AUTO: 8.39 K/UL — SIGNIFICANT CHANGE UP (ref 3.8–10.5)
WBC # FLD AUTO: 9.69 K/UL — SIGNIFICANT CHANGE UP (ref 3.8–10.5)

## 2019-11-21 PROCEDURE — 88312 SPECIAL STAINS GROUP 1: CPT | Mod: 26

## 2019-11-21 PROCEDURE — 88305 TISSUE EXAM BY PATHOLOGIST: CPT | Mod: 26

## 2019-11-21 RX ORDER — APIXABAN 2.5 MG/1
5 TABLET, FILM COATED ORAL
Refills: 0 | Status: DISCONTINUED | OUTPATIENT
Start: 2019-11-22 | End: 2019-11-25

## 2019-11-21 RX ORDER — SUCRALFATE 1 G
1 TABLET ORAL
Refills: 0 | Status: DISCONTINUED | OUTPATIENT
Start: 2019-11-21 | End: 2019-11-25

## 2019-11-21 RX ADMIN — Medication 25 MILLIGRAM(S): at 05:57

## 2019-11-21 RX ADMIN — Medication 1 GRAM(S): at 18:43

## 2019-11-21 RX ADMIN — Medication 25 MILLIGRAM(S): at 14:16

## 2019-11-21 RX ADMIN — Medication 25 MILLIGRAM(S): at 22:37

## 2019-11-21 NOTE — PROGRESS NOTE ADULT - SUBJECTIVE AND OBJECTIVE BOX
Subjective: Patient seen and examined. No new events except as noted.     SUBJECTIVE/ROS:  feels better today   No chest pain, dyspnea, palpitation, or dizziness      MEDICATIONS:  MEDICATIONS  (STANDING):  dextrose 5%. 1000 milliLiter(s) (50 mL/Hr) IV Continuous <Continuous>  dextrose 50% Injectable 12.5 Gram(s) IV Push once  dextrose 50% Injectable 25 Gram(s) IV Push once  dextrose 50% Injectable 25 Gram(s) IV Push once  influenza   Vaccine 0.5 milliLiter(s) IntraMuscular once  insulin lispro (HumaLOG) corrective regimen sliding scale   SubCutaneous at bedtime  insulin lispro (HumaLOG) corrective regimen sliding scale   SubCutaneous three times a day before meals  metoprolol tartrate 25 milliGRAM(s) Oral three times a day  pantoprazole    Tablet 40 milliGRAM(s) Oral before breakfast      PHYSICAL EXAM:  T(C): 36.5 (11-21-19 @ 05:54), Max: 37 (11-20-19 @ 21:21)  HR: 90 (11-21-19 @ 05:54) (90 - 111)  BP: 127/71 (11-21-19 @ 05:54) (111/88 - 138/82)  RR: 18 (11-21-19 @ 05:54) (16 - 18)  SpO2: 96% (11-21-19 @ 05:54) (96% - 98%)  Wt(kg): --  I&O's Summary          JVP: Normal  Neck: supple  Lung: clear   CV: S1 S2 , Murmur:  Abd: soft  Ext: No edema  neuro: Awake / alert  Psych: flat affect  Skin: normal``    LABS/DATA:    CARDIAC MARKERS:  CARDIAC MARKERS ( 20 Nov 2019 16:21 )  x     / x     / 22 U/L / x     / x                                    11.2   8.29  )-----------( 209      ( 21 Nov 2019 08:29 )             36.2     11-21    141  |  104  |  18  ----------------------------<  188<H>  4.1   |  24  |  0.70    Ca    8.9      21 Nov 2019 04:32    TPro  7.2  /  Alb  4.1  /  TBili  0.8  /  DBili  x   /  AST  25  /  ALT  25  /  AlkPhos  81  11-19    proBNP:   Lipid Profile:   HgA1c:   TSH:     TELE:  EKG:

## 2019-11-21 NOTE — DISCHARGE NOTE PROVIDER - CARE PROVIDER_API CALL
Jovan Duran (DO)  Gastroenterology; Internal Medicine  [address] [place] [address]  Phone: [phone]  Fax: [phone]  Follow Up Time: 2 weeks    Matt Gerber (MD)  Internal Medicine  [id number] [place] [address]  Phone: [phone]  Fax: [phone]  Follow Up Time: Routine

## 2019-11-21 NOTE — CHART NOTE - NSCHARTNOTEFT_GEN_A_CORE
interval events    patient s/p GIB, S/P EGD today       EGD results/GI recommendation  Findings:       The examined esophagus was normal.       Diffuse moderately erythematous mucosa was found in the entire examined stomach. Biopsies        were taken with a cold forceps for histology.       Evidence of Billroth II anatomy       Normal afferent and efferent limb       normal anastomosis                                                                                                        Impression:          - Normal esophagus.                       - Erythematous mucosa in the stomach. Biopsied.  Recommendation:      - Return patient to [facility] for [facility] care.                       - Cont PPI                       - Carafate bid                       - f/u path                       - no objection to resume a/c                       - reg diet    Vital Signs Last 24 Hrs  T(C): 36.8 (21 Nov 2019 20:30), Max: 37 (20 Nov 2019 21:21)  T(F): 98.2 (21 Nov 2019 20:30), Max: 98.6 (20 Nov 2019 21:21)  HR: 108 (21 Nov 2019 20:30) (82 - 108)  BP: 131/75 (21 Nov 2019 20:30) (122/70 - 151/86)  BP(mean): --  RR: 20 (21 Nov 2019 20:30) (18 - 20)  SpO2: 96% (21 Nov 2019 20:30) (95% - 98%)                          12.2   9.69  )-----------( 233      ( 21 Nov 2019 12:18 )             39.7                                                                                                           89 F PMH Afib Eliquis here for generalized weakness x 1 week with positive FOBT at PCP office today; after mechanical fall last week where she was on the ground for a day and a half and was cleaned up by a neighbor. No infectious sxs. No head trauma during that fall. No LOC. Was in PCP's office today for 2 episodes of grossly bloody bowel movements and has low H&H at baseline. Patient feels weak and does not feel safe at home.   Patient admitted with GIB, S/P EGD today with results as above.  Patient HD stable  CBC stable  GI cleared the patient to resume AC  Discussed with  cardiology DR Morris and Attending ( patient s/p GIB, hx of fall, EGd results)  Cardiology and Attending cleared the patient to resume Eliquis 5mg PO bid from tomorrow  AC resumed after agrred by Cardiology, GI, and Attending  Discussed with patient  Monitor am CBC  Will follow    Justo Thayer [facility] [place]  [id number]

## 2019-11-21 NOTE — DISCHARGE NOTE PROVIDER - NSDCCPCAREPLAN_GEN_ALL_CORE_FT
PRINCIPAL DISCHARGE DIAGNOSIS  Diagnosis: GI bleed  Assessment and Plan of Treatment: seen by GI  EGD  eliquis on hold PRINCIPAL DISCHARGE DIAGNOSIS  Diagnosis: GI bleed  Assessment and Plan of Treatment: seen by GI  EGD  had  upper gastrointestinal endoscopy 11/21 with normal esophagus  - Erythematous mucosa in the stomach. Biopsied  - Continue omeprazole  - Carafate twice daily  diet as tolerated  eliquis resumed with no bleeding; hemoglobin stable  monitor for any bleeding      SECONDARY DISCHARGE DIAGNOSES  Diagnosis: Atrial fibrillation  Assessment and Plan of Treatment: Atrial fibrillation  continue home medication  monitor for bleeding    Diagnosis: Diabetes  Assessment and Plan of Treatment: Diabetes  Type 2 diabetes  hemoglobin A1c 8.2  continue diabetic diet  continue diabetic medication

## 2019-11-21 NOTE — PROGRESS NOTE ADULT - ASSESSMENT
Called patient to discuss need to have stress test completed. Patient understands and will compete prior to cardiac MRI.       Scheduled for   5/13/19 arrival time 12:15pm  5/14/19 arrival time 1:30 pm         89 F PMH Afib eliquis here for generalized weakness x 1 week with positive FOBT at PCP office today; after mechanical fall last week where she was on the ground for a day and a half and was cleaned up by a neighbour. No infectious sxs. No head trauma during that fall. No LOC. Was in PCP's office today for 2 episodes of grossly bloody bowel movements and has low H&H at baseline. Patient feels weak and does not feel safe at home.     Problem/Plan - 1:  ·  Problem: GI bleed.  Plan: monitor cbc  GI fu   cw protonix  will monitor.     Problem/Plan - 2:  ·  Problem: Atrial fibrillation.  Plan: hold DOVC  cards to follow  pt is a poor candidate for AC sec to fall risk.     Problem/Plan - 3:  ·  Problem: Diabetes.  Plan: monitor FS  ISS.

## 2019-11-21 NOTE — PROGRESS NOTE ADULT - ASSESSMENT
afib   Hr stable   cont low dose BB  a/c on hold given GIB    HTN  stable    GIB  plan for EGD  cv stable to proceed   Monitor hemoglobin, transfuse as needed.

## 2019-11-21 NOTE — PROGRESS NOTE ADULT - SUBJECTIVE AND OBJECTIVE BOX
Pre-Endoscopy Evaluation      Referring Physician: dr. raymundo lowery                                 Procedure:  upper gastrointestinal endoscopy     Indication for Procedure: gib    Sedation by Anesthesia [X]    PAST MEDICAL & SURGICAL HISTORY:  Coronary artery disease  Stented coronary artery  Atrial fibrillation  Diabetes  No significant past surgical history      PMH of Gastroparesis [ ]  Gastric Surgery [X]  Gastric Outlet Obstruction [ ]    Allergies:    No Known Allergies    Intolerances:    Latex allergy: [ ] yes [X] no    Medications:MEDICATIONS  (STANDING):  dextrose 5%. 1000 milliLiter(s) (50 mL/Hr) IV Continuous <Continuous>  dextrose 50% Injectable 12.5 Gram(s) IV Push once  dextrose 50% Injectable 25 Gram(s) IV Push once  dextrose 50% Injectable 25 Gram(s) IV Push once  influenza   Vaccine 0.5 milliLiter(s) IntraMuscular once  insulin lispro (HumaLOG) corrective regimen sliding scale   SubCutaneous at bedtime  insulin lispro (HumaLOG) corrective regimen sliding scale   SubCutaneous three times a day before meals  metoprolol tartrate 25 milliGRAM(s) Oral three times a day  pantoprazole    Tablet 40 milliGRAM(s) Oral before breakfast    MEDICATIONS  (PRN):  dextrose 40% Gel 15 Gram(s) Oral once PRN Blood Glucose LESS THAN 70 milliGRAM(s)/deciliter  glucagon  Injectable 1 milliGRAM(s) IntraMuscular once PRN Glucose LESS THAN 70 milligrams/deciliter      Smoking: [ ] yes  [X] no    AICD/PPM: [ ] yes   [X] no    Pertinent lab data:                        12.2   9.69  )-----------( 233      ( 21 Nov 2019 12:18 )             39.7     11-21    141  |  104  |  18  ----------------------------<  188<H>  4.1   |  24  |  0.70    Ca    8.9      21 Nov 2019 04:32    TPro  7.2  /  Alb  4.1  /  TBili  0.8  /  DBili  x   /  AST  25  /  ALT  25  /  AlkPhos  81  11-19      CARDIAC MARKERS ( 20 Nov 2019 16:21 )  x     / x     / 22 U/L / x     / x          CAPILLARY BLOOD GLUCOSE  POCT Blood Glucose.: 135 mg/dL (21 Nov 2019 14:01)      Physical Examination:      Daily   Vital Signs Last 24 Hrs  T(C): 36.9 (21 Nov 2019 11:22), Max: 37 (20 Nov 2019 21:21)  T(F): 98.5 (21 Nov 2019 11:22), Max: 98.6 (20 Nov 2019 21:21)  HR: 82 (21 Nov 2019 11:22) (82 - 96)  BP: 128/73 (21 Nov 2019 11:22) (122/70 - 128/73)  BP(mean): --  RR: 18 (21 Nov 2019 11:22) (18 - 18)  SpO2: 96% (21 Nov 2019 11:22) (96% - 98%)    Drug Dosing Weight    Weight (kg): 66.2 (19 Nov 2019 18:22)      Constitutional: NAD     Neck:  No JVD    Respiratory: CTAB/L    Cardiovascular: S1 and S2    Gastrointestinal: BS+, soft, NT/ND    Extremities: No peripheral edema    Neurological: A/O x 3    : No Godwin    Skin: No rashes    Comments:      The patient is a suitable candidate for the planned procedure unless box checked [ ]  No, explain:

## 2019-11-21 NOTE — PROGRESS NOTE ADULT - SUBJECTIVE AND OBJECTIVE BOX
Patient is a 89y old  Female who presents with a chief complaint of weakness (2019 16:23)      INTERVAL HPI/OVERNIGHT EVENTS:  T(C): 36.7 (19 @ 17:30), Max: 37 (19 @ 21:21)  HR: 100 (19 @ 17:30) (82 - 100)  BP: 151/86 (19 @ 17:30) (122/70 - 151/86)  RR: 18 (19 @ 17:30) (18 - 18)  SpO2: 95% (19 @ 17:30) (95% - 98%)  Wt(kg): --  I&O's Summary    2019 07:01  -  2019 19:32  --------------------------------------------------------  IN: 0 mL / OUT: 1 mL / NET: -1 mL        LABS:                        12.2   9.69  )-----------( 233      ( 2019 12:18 )             39.7     11-21    141  |  104  |  18  ----------------------------<  188<H>  4.1   |  24  |  0.70    Ca    8.9      2019 04:32    TPro  7.2  /  Alb  4.1  /  TBili  0.8  /  DBili  x   /  AST  25  /  ALT  25  /  AlkPhos  81  11-19      Urinalysis Basic - ( 2019 21:31 )    Color: Yellow / Appearance: Clear / S.024 / pH: x  Gluc: x / Ketone: Negative  / Bili: Negative / Urobili: Negative   Blood: x / Protein: 30 mg/dL / Nitrite: Negative   Leuk Esterase: Negative / RBC: 1 /hpf / WBC 0 /HPF   Sq Epi: x / Non Sq Epi: 1 /hpf / Bacteria: Negative      CAPILLARY BLOOD GLUCOSE      POCT Blood Glucose.: 152 mg/dL (2019 19:00)  POCT Blood Glucose.: 135 mg/dL (2019 14:01)  POCT Blood Glucose.: 186 mg/dL (2019 06:10)  POCT Blood Glucose.: 189 mg/dL (2019 22:33)        Urinalysis Basic - ( 2019 21:31 )    Color: Yellow / Appearance: Clear / S.024 / pH: x  Gluc: x / Ketone: Negative  / Bili: Negative / Urobili: Negative   Blood: x / Protein: 30 mg/dL / Nitrite: Negative   Leuk Esterase: Negative / RBC: 1 /hpf / WBC 0 /HPF   Sq Epi: x / Non Sq Epi: 1 /hpf / Bacteria: Negative        MEDICATIONS  (STANDING):  dextrose 5%. 1000 milliLiter(s) (50 mL/Hr) IV Continuous <Continuous>  dextrose 50% Injectable 12.5 Gram(s) IV Push once  dextrose 50% Injectable 25 Gram(s) IV Push once  dextrose 50% Injectable 25 Gram(s) IV Push once  influenza   Vaccine 0.5 milliLiter(s) IntraMuscular once  insulin lispro (HumaLOG) corrective regimen sliding scale   SubCutaneous at bedtime  insulin lispro (HumaLOG) corrective regimen sliding scale   SubCutaneous three times a day before meals  metoprolol tartrate 25 milliGRAM(s) Oral three times a day  pantoprazole    Tablet 40 milliGRAM(s) Oral before breakfast  sucralfate suspension 1 Gram(s) Oral two times a day    MEDICATIONS  (PRN):  dextrose 40% Gel 15 Gram(s) Oral once PRN Blood Glucose LESS THAN 70 milliGRAM(s)/deciliter  glucagon  Injectable 1 milliGRAM(s) IntraMuscular once PRN Glucose LESS THAN 70 milligrams/deciliter          PHYSICAL EXAM:  GENERAL: NAD, well-groomed, well-developed  HEAD:  Atraumatic, Normocephalic  CHEST/LUNG: Clear to percussion bilaterally; No rales, rhonchi, wheezing, or rubs  HEART: Regular rate and rhythm; No murmurs, rubs, or gallops  ABDOMEN: Soft, Nontender, Nondistended; Bowel sounds present  EXTREMITIES:  2+ Peripheral Pulses, No clubbing, cyanosis, or edema  LYMPH: No lymphadenopathy noted  SKIN: No rashes or lesions    Care Discussed with Consultants/Other Providers [ ] YES  [ ] NO

## 2019-11-21 NOTE — DISCHARGE NOTE PROVIDER - PROVIDER TOKENS
PROVIDER:[TOKEN:[[id number]],FOLLOWUP:[2 weeks]],PROVIDER:[TOKEN:[[id number]],FOLLOWUP:[Routine]]

## 2019-11-21 NOTE — DISCHARGE NOTE PROVIDER - HOSPITAL COURSE
89 F PMH Afib eliquis here for generalized weakness x 1 week with positive FOBT at PCP office today; after mechanical fall last week where she was on the ground for a day and a half and was cleaned up by a neighbour.      GIB 89 F PMH Afib eliquis here for generalized weakness x 1 week with positive FOBT at PCP office;  mechanical fall last week ;     GIB; seen by GI.    had upper gastrointestinal endoscopy 11/21 with normal esophagus    - Erythematous mucosa in the stomach. Biopsied    - Cont PPI    - Carafate bid    no further bleeding; H/H stable.    seen by PT and advised rehab;    cleared by MD [facility] 89 F PMH Afib eliquis here for generalized weakness x 1 week with positive FOBT at PCP office;  mechanical fall last week ;     GIB; seen by GI.    had upper gastrointestinal endoscopy 11/21 with normal esophagus    - Erythematous mucosa in the stomach. Biopsied    - Cont PPI    - Carafate bid    no further bleeding; H/H stable.    seen by cardiology/neurology;    ct head c/w mild microvascular disease     pt eval may benefit from short term rehab     ct lumbar spine due to new radiculopathy     pain medication as prescribed        seen by PT and advised rehab;    cleared by MD for  rehab

## 2019-11-21 NOTE — DISCHARGE NOTE PROVIDER - NSDCMRMEDTOKEN_GEN_ALL_CORE_FT
Eliquis 5 mg oral tablet: 1 tab(s) orally 2 times a day  glipiZIDE 5 mg oral tablet: 1 tab(s) orally 2 times a day  metFORMIN 500 mg oral tablet: 1 tab(s) orally 2 times a day  metoprolol tartrate 100 mg oral tablet: 1 tab(s) orally once a day (in the morning)  metoprolol tartrate 50 mg oral tablet: 1 tab(s) orally once a day (at bedtime)  omeprazole 20 mg oral delayed release tablet: 1 tab(s) orally once a day acetaminophen 325 mg oral tablet: 2 tab(s) orally every 6 hours, As needed, Mild Pain (1 - 3), Moderate Pain (4 - 6)  Eliquis 5 mg oral tablet: 1 tab(s) orally 2 times a day  gabapentin 100 mg oral capsule: 1 cap(s) orally once a day (at bedtime)  glipiZIDE 5 mg oral tablet: 1 tab(s) orally 2 times a day  metFORMIN 500 mg oral tablet: 1 tab(s) orally 2 times a day  metoprolol tartrate 50 mg oral tablet: 1 tab(s) orally 2 times a day  omeprazole 20 mg oral delayed release tablet: 1 tab(s) orally once a day  simethicone 80 mg oral tablet, chewable: 1 tab(s) orally every 6 hours  sucralfate 1 g/10 mL oral suspension: 10 milliliter(s) orally 2 times a day acetaminophen 325 mg oral tablet: 2 tab(s) orally every 6 hours, As needed, Mild Pain (1 - 3), Moderate Pain (4 - 6)  aluminum hydroxide-magnesium hydroxide 200 mg-200 mg/5 mL oral suspension: 30 milliliter(s) orally every 6 hours, As needed, Dyspepsia  Eliquis 5 mg oral tablet: 1 tab(s) orally 2 times a day  gabapentin 100 mg oral capsule: 1 cap(s) orally once a day (at bedtime)  glipiZIDE 5 mg oral tablet: 1 tab(s) orally 2 times a day  metFORMIN 500 mg oral tablet: 1 tab(s) orally 2 times a day  metoprolol tartrate 50 mg oral tablet: 1 tab(s) orally 2 times a day  omeprazole 20 mg oral delayed release tablet: 1 tab(s) orally once a day  simethicone 80 mg oral tablet, chewable: 1 tab(s) orally every 6 hours for  5 days  sucralfate 1 g/10 mL oral suspension: 10 milliliter(s) orally 2 times a day

## 2019-11-22 LAB
FOLATE SERPL-MCNC: 15.9 NG/ML — SIGNIFICANT CHANGE UP
GLUCOSE BLDC GLUCOMTR-MCNC: 150 MG/DL — HIGH (ref 70–99)
GLUCOSE BLDC GLUCOMTR-MCNC: 156 MG/DL — HIGH (ref 70–99)
GLUCOSE BLDC GLUCOMTR-MCNC: 163 MG/DL — HIGH (ref 70–99)
GLUCOSE BLDC GLUCOMTR-MCNC: 212 MG/DL — HIGH (ref 70–99)
GLUCOSE BLDC GLUCOMTR-MCNC: 267 MG/DL — HIGH (ref 70–99)
HCT VFR BLD CALC: 40.8 % — SIGNIFICANT CHANGE UP (ref 34.5–45)
HGB BLD-MCNC: 12.7 G/DL — SIGNIFICANT CHANGE UP (ref 11.5–15.5)
MCHC RBC-ENTMCNC: 27.4 PG — SIGNIFICANT CHANGE UP (ref 27–34)
MCHC RBC-ENTMCNC: 31.1 GM/DL — LOW (ref 32–36)
MCV RBC AUTO: 88.1 FL — SIGNIFICANT CHANGE UP (ref 80–100)
PLATELET # BLD AUTO: 210 K/UL — SIGNIFICANT CHANGE UP (ref 150–400)
RBC # BLD: 4.63 M/UL — SIGNIFICANT CHANGE UP (ref 3.8–5.2)
RBC # FLD: 14.7 % — HIGH (ref 10.3–14.5)
TSH SERPL-MCNC: 2.27 UIU/ML — SIGNIFICANT CHANGE UP (ref 0.27–4.2)
VIT B12 SERPL-MCNC: 514 PG/ML — SIGNIFICANT CHANGE UP (ref 232–1245)
WBC # BLD: 9.2 K/UL — SIGNIFICANT CHANGE UP (ref 3.8–10.5)
WBC # FLD AUTO: 9.2 K/UL — SIGNIFICANT CHANGE UP (ref 3.8–10.5)

## 2019-11-22 RX ORDER — SIMETHICONE 80 MG/1
80 TABLET, CHEWABLE ORAL EVERY 4 HOURS
Refills: 0 | Status: DISCONTINUED | OUTPATIENT
Start: 2019-11-22 | End: 2019-11-25

## 2019-11-22 RX ADMIN — Medication 25 MILLIGRAM(S): at 14:47

## 2019-11-22 RX ADMIN — APIXABAN 5 MILLIGRAM(S): 2.5 TABLET, FILM COATED ORAL at 05:18

## 2019-11-22 RX ADMIN — Medication 1 GRAM(S): at 05:18

## 2019-11-22 RX ADMIN — Medication 25 MILLIGRAM(S): at 21:05

## 2019-11-22 RX ADMIN — Medication 25 MILLIGRAM(S): at 05:18

## 2019-11-22 RX ADMIN — Medication 1: at 12:29

## 2019-11-22 RX ADMIN — APIXABAN 5 MILLIGRAM(S): 2.5 TABLET, FILM COATED ORAL at 17:58

## 2019-11-22 RX ADMIN — Medication 2: at 17:58

## 2019-11-22 RX ADMIN — Medication 1: at 22:35

## 2019-11-22 RX ADMIN — Medication 1 GRAM(S): at 17:58

## 2019-11-22 RX ADMIN — Medication 1: at 08:57

## 2019-11-22 RX ADMIN — PANTOPRAZOLE SODIUM 40 MILLIGRAM(S): 20 TABLET, DELAYED RELEASE ORAL at 05:19

## 2019-11-22 RX ADMIN — SIMETHICONE 80 MILLIGRAM(S): 80 TABLET, CHEWABLE ORAL at 14:47

## 2019-11-22 NOTE — DIETITIAN INITIAL EVALUATION ADULT. - ENERGY INTAKE
Due to chewing difficulty. Pt refused texture change. Went over alternative options with pt./Poor (<50%)

## 2019-11-22 NOTE — PROGRESS NOTE ADULT - ASSESSMENT
89 F PMH Afib eliquis here for generalized weakness x 1 week with positive FOBT at PCP office today; after mechanical fall last week where she was on the ground for a day and a half and was cleaned up by a neighbour. No infectious sxs. No head trauma during that fall. No LOC. Was in PCP's office today for 2 episodes of grossly bloody bowel movements and has low H&H at baseline. Patient feels weak and does not feel safe at home.     Problem/Plan - 1:  ·  Problem: GI bleed.  Plan: monitor cbc  GI fu appreciated  S/P EGD, noted   cw protonix  restart AC and monitor for rebleeding     Problem/Plan - 2:  ·  Problem: Atrial fibrillation.  Plan:   cards to follow  restart AC   monitor Hgb level and watch for rebleeding     Problem/Plan - 3:  ·  Problem: Diabetes.  Plan: monitor FS  ISS.

## 2019-11-22 NOTE — PROGRESS NOTE ADULT - SUBJECTIVE AND OBJECTIVE BOX
Subjective: Patient seen and examined. No new events except as noted.     SUBJECTIVE/ROS:    No chest pain, dyspnea, palpitation, or dizziness.     MEDICATIONS:  MEDICATIONS  (STANDING):  apixaban 5 milliGRAM(s) Oral two times a day  dextrose 5%. 1000 milliLiter(s) (50 mL/Hr) IV Continuous <Continuous>  dextrose 50% Injectable 12.5 Gram(s) IV Push once  dextrose 50% Injectable 25 Gram(s) IV Push once  dextrose 50% Injectable 25 Gram(s) IV Push once  influenza   Vaccine 0.5 milliLiter(s) IntraMuscular once  insulin lispro (HumaLOG) corrective regimen sliding scale   SubCutaneous at bedtime  insulin lispro (HumaLOG) corrective regimen sliding scale   SubCutaneous three times a day before meals  metoprolol tartrate 25 milliGRAM(s) Oral three times a day  pantoprazole    Tablet 40 milliGRAM(s) Oral before breakfast  sucralfate suspension 1 Gram(s) Oral two times a day      PHYSICAL EXAM:  T(C): 36.8 (11-22-19 @ 04:46), Max: 36.9 (11-21-19 @ 11:22)  HR: 98 (11-22-19 @ 04:46) (82 - 108)  BP: 126/85 (11-22-19 @ 04:46) (126/85 - 151/86)  RR: 20 (11-22-19 @ 04:46) (18 - 20)  SpO2: 98% (11-22-19 @ 04:46) (95% - 98%)  Wt(kg): --  I&O's Summary    21 Nov 2019 07:01  -  22 Nov 2019 07:00  --------------------------------------------------------  IN: 120 mL / OUT: 1 mL / NET: 119 mL            JVP: Normal  Neck: supple  Lung: clear   CV: S1 S2 , Murmur:  Abd: soft  Ext: No edema  neuro: Awake / alert  Psych: flat affect  Skin: normal``    LABS/DATA:    CARDIAC MARKERS:  CARDIAC MARKERS ( 20 Nov 2019 16:21 )  x     / x     / 22 U/L / x     / x                                    12.7   9.20  )-----------( 210      ( 22 Nov 2019 08:21 )             40.8     11-21    141  |  104  |  18  ----------------------------<  188<H>  4.1   |  24  |  0.70    Ca    8.9      21 Nov 2019 04:32      proBNP:   Lipid Profile:   HgA1c:   TSH: Thyroid Stimulating Hormone, Serum: 2.27 uIU/mL (11-22 @ 09:09)      TELE:  EKG:

## 2019-11-22 NOTE — PROGRESS NOTE ADULT - SUBJECTIVE AND OBJECTIVE BOX
[facility]    Subjective: Patient seen and examined. No new events except as noted.   cont to have mild abdominal discomfort     REVIEW OF SYSTEMS:    CONSTITUTIONAL: No weakness, fevers or chills  EYES/ENT: No visual changes;  No vertigo or throat pain   NECK: No pain or stiffness  RESPIRATORY: No cough, wheezing, hemoptysis; No shortness of breath  CARDIOVASCULAR: No chest pain or palpitations  GASTROINTESTINAL: abdominal discomfort   GENITOURINARY: No dysuria, frequency or hematuria  NEUROLOGICAL: No numbness or weakness  SKIN: No itching, burning, rashes, or lesions   All other review of systems is negative unless indicated above.    MEDICATIONS:  MEDICATIONS  (STANDING):  apixaban 5 milliGRAM(s) Oral two times a day  dextrose 5%. 1000 milliLiter(s) (50 mL/Hr) IV Continuous <Continuous>  dextrose 50% Injectable 12.5 Gram(s) IV Push once  dextrose 50% Injectable 25 Gram(s) IV Push once  dextrose 50% Injectable 25 Gram(s) IV Push once  influenza   Vaccine 0.5 milliLiter(s) IntraMuscular once  insulin lispro (HumaLOG) corrective regimen sliding scale   SubCutaneous at bedtime  insulin lispro (HumaLOG) corrective regimen sliding scale   SubCutaneous three times a day before meals  metoprolol tartrate 25 milliGRAM(s) Oral three times a day  pantoprazole    Tablet 40 milliGRAM(s) Oral before breakfast  sucralfate suspension 1 Gram(s) Oral two times a day      PHYSICAL EXAM:  T(C): 36.9 (11-22-19 @ 12:13), Max: 36.9 (11-22-19 @ 12:13)  HR: 105 (11-22-19 @ 12:13) (98 - 108)  BP: 127/84 (11-22-19 @ 12:13) (126/85 - 151/86)  RR: 20 (11-22-19 @ 12:13) (18 - 20)  SpO2: 97% (11-22-19 @ 12:13) (95% - 98%)  Wt(kg): --  I&O's Summary    21 Nov 2019 07:01  -  22 Nov 2019 07:00  --------------------------------------------------------  IN: 120 mL / OUT: 1 mL / NET: 119 mL    22 Nov 2019 07:01  -  22 Nov 2019 14:02  --------------------------------------------------------  IN: 120 mL / OUT: 0 mL / NET: 120 mL          Appearance: Normal	  HEENT:   Normal oral mucosa, PERRL, EOMI	  Lymphatic: No lymphadenopathy , no edema  Cardiovascular: Normal S1 S2  Respiratory: Lungs clear to auscultation, normal effort 	  Gastrointestinal:  Soft, Non-tender, + BS	  Skin: No rashes, No ecchymoses, No cyanosis, warm to touch  Musculoskeletal: Normal range of motion, normal strength  Psychiatry:  Mood & affect appropriate  Ext: No edema      All labs, Imaging and EKGs personally reviewed                             12.7   9.20  )-----------( 210      ( 22 Nov 2019 08:21 )             40.8               11-21    141  |  104  |  18  ----------------------------<  188<H>  4.1   |  24  |  0.70    Ca    8.9      21 Nov 2019 04:32             CARDIAC MARKERS ( 20 Nov 2019 16:21 )  x     / x     / 22 U/L / x     / x                  < from: Upper Endoscopy (11.21.19 @ 16:19) >  Impression:          - Normal esophagus.                       - Erythematous mucosa in the stomach. Biopsied.  Recommendation:      - Return patient to [facility] for ongoing care.                       - Cont PPI                       - Carafate bid                       - f/u path                       - no objection to resume a/c                       - reg diet

## 2019-11-22 NOTE — PHYSICAL THERAPY INITIAL EVALUATION ADULT - PERTINENT HX OF CURRENT PROBLEM, REHAB EVAL
Pt is a 89 F PMH Afib eliquis here for generalized weakness x 1 week with positive FOBT at PCP office today. Pt mechanical fall last week where she was on the ground for a day and a half and was cleaned up by a neighbour. No head trauma during that fall. No LOC. Pt has low H&H at baseline. Patient feels weak and does not feel safe at home. Hospital course: s/p  upper gastrointestinal endoscopy 11/21 with normal esophagus. Erythematous mucosa in the stomach.

## 2019-11-22 NOTE — PROGRESS NOTE ADULT - ASSESSMENT
melena    s/p  upper gastrointestinal endoscopy 11/21 with normal esophagus  - Erythematous mucosa in the stomach. Biopsied  - Cont PPI  - Carafate bid   - f/u path  - no objection to resume a/c  - reg diet

## 2019-11-22 NOTE — DIETITIAN INITIAL EVALUATION ADULT. - OTHER INFO
Pt reports good appetite and intake PTA. Reports not following consistent carbohydrate diet at home. States she eats a few small meals throughout the day as she cannot eat too much at once due to feeling full. Pt unable to recall specific foods she ate at home other than fruit for breakfast. Pt reports no vitamins taken PTA. Reports occasionally drinking Ensure at home. Pt reports NKFA. Pt reports taking medications for diabetes control at home; unable to recall which ones. Per H&P pt takes metformin and glipizide.   Pt reports no change in appetite in hospital. Observed only 10% of tray consumed at lunch today (11/22). Pt reports fair PO intake during hospital course due to not being able to chew food on tray. Discussed texture change with pt but pt states she will choose foods she can eat. Pt denies GI distress; last BM yesterday (11/21). Pt reports no recent weight changes. Reports usual body weight as 145 pounds consistent with charted weight of 145.6 pounds (11/19). Note 89 yo pt with HbA1c of 8.2% shows good blood glucose control.   Pt amenable to brief education on consistent carbohydrate intake and pairing protein foods with carbohydrates with examples of each food group. Obtained preferences and briefly looked over menu with pt. Pt reports good appetite and intake PTA. Reports not following consistent carbohydrate diet at home. States she eats a few small meals throughout the day as she cannot eat too much at once due to feeling full. Pt unable to recall specific foods she ate at home other than fruit for breakfast. Pt reports no vitamins taken PTA. Reports occasionally drinking Ensure at home. Pt reports NKFA. Pt reports taking medications for diabetes control at home; unable to recall which ones. Per H&P pt takes metformin and glipizide.   Pt reports no change in appetite in hospital. Observed only 10% of tray consumed at lunch today (11/22). Pt reports fair PO intake during hospital course due to not being able to chew food on tray. Discussed texture change with pt but pt states she will choose foods she can eat. Pt denies GI distress; last BM yesterday (11/21); noted with fecal incontinence per documentation. Pt reports no recent weight changes. Reports usual body weight as 145 pounds consistent with charted weight of 145.6 pounds (11/19). Note 89 yo pt with HbA1c of 8.2% which is within an optimal range at an advanced age.   Pt amenable to brief education on consistent carbohydrate intake and pairing protein foods with carbohydrates with examples of each food group. Obtained preferences and briefly looked over menu with pt.

## 2019-11-22 NOTE — DIETITIAN INITIAL EVALUATION ADULT. - REASON INDICATOR FOR ASSESSMENT
Pt seen for consult for nutrition support initial nutrition evaluation.   Information obtained from pt and medical record. Pt hard of hearing.

## 2019-11-22 NOTE — DIETITIAN INITIAL EVALUATION ADULT. - PHYSICAL APPEARANCE
well nourished/overweight/other (specify) Ht: 60 inches Wt: 145.6 pounds BMI: 28.4 kg/m2 IBW: 100 pounds (+/-10%) %IBW: 145%  Edema: none noted per flow sheets Skin: intact per documentation

## 2019-11-22 NOTE — DIETITIAN INITIAL EVALUATION ADULT. - ETIOLOGY
Food and nutrition related knowledge deficit for a consistent carbohydrate diet Food and nutrition related knowledge deficit on consistent carbohydrate diet

## 2019-11-22 NOTE — PROGRESS NOTE ADULT - SUBJECTIVE AND OBJECTIVE BOX
[facility]      Seen earlier today, and examined.  - Today, patient is without complaints.           *****MEDICATIONS: Current medication reviewed and documented.    MEDICATIONS  (STANDING):  apixaban 5 milliGRAM(s) Oral two times a day  dextrose 5%. 1000 milliLiter(s) (50 mL/Hr) IV Continuous <Continuous>  dextrose 50% Injectable 12.5 Gram(s) IV Push once  dextrose 50% Injectable 25 Gram(s) IV Push once  dextrose 50% Injectable 25 Gram(s) IV Push once  influenza   Vaccine 0.5 milliLiter(s) IntraMuscular once  insulin lispro (HumaLOG) corrective regimen sliding scale   SubCutaneous at bedtime  insulin lispro (HumaLOG) corrective regimen sliding scale   SubCutaneous three times a day before meals  metoprolol tartrate 25 milliGRAM(s) Oral three times a day  pantoprazole    Tablet 40 milliGRAM(s) Oral before breakfast  sucralfate suspension 1 Gram(s) Oral two times a day    MEDICATIONS  (PRN):  dextrose 40% Gel 15 Gram(s) Oral once PRN Blood Glucose LESS THAN 70 milliGRAM(s)/deciliter  glucagon  Injectable 1 milliGRAM(s) IntraMuscular once PRN Glucose LESS THAN 70 milligrams/deciliter          ***** VITAL SIGNS:  T(F): 98.2 (19 @ 04:46), Max: 98.5 (19 @ 11:22)  HR: 98 (19 @ 04:46) (82 - 108)  BP: 126/85 (19 @ 04:46) (126/85 - 151/86)  RR: 20 (19 @ 04:46) (18 - 20)  SpO2: 98% (19 @ 04:46) (95% - 98%)  Wt(kg): --  ,   I&O's Summary    2019 07:01  -  2019 07:00  --------------------------------------------------------  IN: 120 mL / OUT: 1 mL / NET: 119 mL             *****PHYSICAL EXAM:   Alert oriented x 2  Attention comprehension are fair. Able to name, repeat, without any difficulty.   Able to follow 1-2 step commands.     EOMI fundi not visualized,  VFF to confrontration  No facial asymmetry   Tongue is midline   Palate elevates symmetrically   Moving both upper ext symmetrically antigravity   b/l Iliopsoas 2/5   hamstrings 3/5 quad 3/5  dorsiflexion 3/5   patchy areas of numbness.  Gait : not assessed.  B/L down going toes          *****LAB AND IMAGIN.7   9.20  )-----------( 210      ( 2019 08:21 )             40.8               11-21    141  |  104  |  18  ----------------------------<  188<H>  4.1   |  24  |  0.70    Ca    8.9      2019 04:32             CARDIAC MARKERS ( 2019 16:21 )  x     / x     / 22 U/L / x     / x                    [All pertinent recent Imaging/Reports reviewed]           *****A S S E S S M E N T   A N D   P L A N :    89 F PMH Afib eliquis here for generalized weakness x 1 week with positive FOBT at PCP office today; after mechanical fall last week where she was on the ground for a day and a half and was cleaned up by a neighbour. No infectious sxs. No head trauma during that fall. No LOC. Was in PCP's office today for 2 episodes of grossly bloody bowel movements and has low H&H at baseline. Patient feels weak and does not feel safe at home.        Problem/Recommendations 1:  fall mechanical vs. progressive deconditioning vs. microvascular disease   ct head c/w mild microvascular disease   pt eval may benefit from short term rehab   home safety eval  social work consult for home health aide   recommend alert bracelet     Problem/Recommendations 2:afib on eliquis with GIB   s/p egd         Thank you for allowing me to participate in the care of this patient. Please do not hesitate to call me if you have any  questions.        ________________  Luana Frederick MD  [facility]  [phone]      33 minutes spent on total encounter; more than 50 % of the visit was  spent counseling about plan of care, compliance to diet/exercise and medication regimen and or  coordinating care by the attending physician.      It is advised that stroke patients follow up with KRISTI Bradford @ [phone] in 1- 2 weeks.   Others please follow up with Dr. Michael Nissenbaum [phone]

## 2019-11-22 NOTE — PROGRESS NOTE ADULT - SUBJECTIVE AND OBJECTIVE BOX
INTERVAL HPI/OVERNIGHT EVENTS:    s/p  upper gastrointestinal endoscopy normal esophagus.   - Erythematous mucosa in the stomach. Biopsied.    Pt seen and examined. She denies abdominal pain, n/v.   tolerating PO   + brown stool this morning    MEDICATIONS  (STANDING):  apixaban 5 milliGRAM(s) Oral two times a day  dextrose 5%. 1000 milliLiter(s) (50 mL/Hr) IV Continuous <Continuous>  dextrose 50% Injectable 12.5 Gram(s) IV Push once  dextrose 50% Injectable 25 Gram(s) IV Push once  dextrose 50% Injectable 25 Gram(s) IV Push once  influenza   Vaccine 0.5 milliLiter(s) IntraMuscular once  insulin lispro (HumaLOG) corrective regimen sliding scale   SubCutaneous at bedtime  insulin lispro (HumaLOG) corrective regimen sliding scale   SubCutaneous three times a day before meals  metoprolol tartrate 25 milliGRAM(s) Oral three times a day  pantoprazole    Tablet 40 milliGRAM(s) Oral before breakfast  sucralfate suspension 1 Gram(s) Oral two times a day    MEDICATIONS  (PRN):  dextrose 40% Gel 15 Gram(s) Oral once PRN Blood Glucose LESS THAN 70 milliGRAM(s)/deciliter  glucagon  Injectable 1 milliGRAM(s) IntraMuscular once PRN Glucose LESS THAN 70 milligrams/deciliter      Allergies    No Known Allergies    Intolerances        Review of Systems:    General:  No wt loss, fevers, chills, night sweats,fatigue,   Eyes:  Good vision, no reported pain  ENT:  No sore throat, pain, runny nose, dysphagia  CV:  No pain, palpitations, hypo/hypertension  Resp:  No dyspnea, cough, tachypnea, wheezing  GI:  No pain, No nausea, No vomiting, No diarrhea, No constipation, No weight loss, No fever, No pruritis, No rectal bleeding, No melena, No dysphagia  :  No pain, bleeding, incontinence, nocturia  Muscle:  No pain, weakness  Neuro:  No weakness, tingling, memory problems  Psych:  No fatigue, insomnia, mood problems, depression  Endocrine:  No polyuria, polydypsia, cold/heat intolerance  Heme:  No petechiae, ecchymosis, easy bruisability  Skin:  No rash, tattoos, scars, edema      Vital Signs Last 24 Hrs  T(C): 36.8 (22 Nov 2019 04:46), Max: 36.9 (21 Nov 2019 11:22)  T(F): 98.2 (22 Nov 2019 04:46), Max: 98.5 (21 Nov 2019 11:22)  HR: 98 (22 Nov 2019 04:46) (82 - 108)  BP: 126/85 (22 Nov 2019 04:46) (126/85 - 151/86)  BP(mean): --  RR: 20 (22 Nov 2019 04:46) (18 - 20)  SpO2: 98% (22 Nov 2019 04:46) (95% - 98%)    PHYSICAL EXAM:    Constitutional: NAD, well-developed  HEENT: EOMI, throat clear  Neck: No LAD, supple  Respiratory: CTA and P  Cardiovascular: S1 and S2, RRR, no M  Gastrointestinal: BS+, soft, NT/ND, neg HSM,  Extremities: No peripheral edema, neg clubing, cyanosis  Vascular: 2+ peripheral pulses  Neurological: A/O x 3, no focal deficits  Psychiatric: Normal mood, normal affect  Skin: No rashes      LABS:                        12.7   9.20  )-----------( 210      ( 22 Nov 2019 08:21 )             40.8     11-21    141  |  104  |  18  ----------------------------<  188<H>  4.1   |  24  |  0.70    Ca    8.9      21 Nov 2019 04:32            RADIOLOGY & ADDITIONAL TESTS:

## 2019-11-22 NOTE — DIETITIAN INITIAL EVALUATION ADULT. - ENERGY NEEDS
Per medical record pt is a 89 yo female with PMH: Afib on Eliquis, partial gastrectomy, CAD, T2DM, admitted with weakness following a mechanical fall at home and melena, found with upper GI bleed, S/P endoscopy (11/21) found with erythematous mucosa in stomach-pending biopsy results.

## 2019-11-22 NOTE — PHYSICAL THERAPY INITIAL EVALUATION ADULT - ADDITIONAL COMMENTS
As per CM note: pt lives alone in a house w/ 4 steps to enter. Pt was ambulating w/ RW and required assist w/ some ADLs. Pt had HHA 12hrs a day for 2 days.

## 2019-11-22 NOTE — PROGRESS NOTE ADULT - PROVIDER SPECIALTY LIST ADULT
Cardiology HPI  FOLLOW UP VISIT REGARDING A CYST UNDER LEFT ARMPIT THAT WAS LANCED.  Patient is currently taking bactrim.      Seen 9 days ago in [facility] ED, had abscess in L axilla.  I/D, drained for several days.   Did have one seem to reseal and rupture again.  Has a second one that seems to be stable but is worried about it.  No fever, joint pain.  Skin seems to be reacting to tape.  Has never had anything like this before.    Was seen last summer for CPE, Rx escitalopram for anxiety, never did take.  Would like to start this now.    Review of Systems   Constitutional: Negative for fever.   Musculoskeletal: Negative for joint pain.   Skin: Positive for rash.        L axilla abscess         Physical Exam   Constitutional: She is oriented to person, place, and time. She appears well-developed and well-nourished.   Eyes: Conjunctivae and EOM are normal.   Cardiovascular: Normal rate, regular rhythm and normal heart sounds.   Pulmonary/Chest: Effort normal and breath sounds normal.   Musculoskeletal: She exhibits no edema.   Neurological: She is alert and oriented to person, place, and time.   Skin: Skin is warm and dry.   1 cm floculent mass in distal axilla on a base of induration.  Minimally tender.    Vitals reviewed.    (L02.419) Abscess, axilla  (primary encounter diagnosis)  Comment: Verbal consent obtained.  Area prepped with alochol.  Anesthesia with ethyl chloride.  Incision into abscess made with 18g needle.  Contents expressed.  Dry dressing applied.  Patient tolerated procedure well.    Plan: DRAIN SKIN ABSCESS SIMPLE/SINGLE            (K21.9) Gastroesophageal reflux disease without esophagitis  Comment: refill  Plan: omeprazole (PRILOSEC) 40 MG DR capsule            (E66.01) Morbid obesity (H)  Comment:   Plan: weight loss reviewed    (J30.2) Seasonal allergic rhinitis, unspecified trigger  Comment:   Plan: fluticasone (VERAMYST) 27.5 MCG/SPRAY nasal         spray            (F41.9) Anxiety  Comment: refilling, to  f/u in 1m  Plan: escitalopram (LEXAPRO) 10 MG tablet              RTC in 1m    Han Rincon MD       Yes

## 2019-11-22 NOTE — DIETITIAN INITIAL EVALUATION ADULT. - ADD RECOMMEND
1. Continue to monitor PO intake, weight, labs, skin, and further educational needs. 2. Reinforce education on consistent carbohydrate diet.

## 2019-11-23 LAB
ANION GAP SERPL CALC-SCNC: 16 MMOL/L — SIGNIFICANT CHANGE UP (ref 5–17)
BUN SERPL-MCNC: 14 MG/DL — SIGNIFICANT CHANGE UP (ref 7–23)
CALCIUM SERPL-MCNC: 9.2 MG/DL — SIGNIFICANT CHANGE UP (ref 8.4–10.5)
CHLORIDE SERPL-SCNC: 103 MMOL/L — SIGNIFICANT CHANGE UP (ref 96–108)
CO2 SERPL-SCNC: 23 MMOL/L — SIGNIFICANT CHANGE UP (ref 22–31)
CREAT SERPL-MCNC: 0.71 MG/DL — SIGNIFICANT CHANGE UP (ref 0.5–1.3)
GLUCOSE BLDC GLUCOMTR-MCNC: 165 MG/DL — HIGH (ref 70–99)
GLUCOSE BLDC GLUCOMTR-MCNC: 220 MG/DL — HIGH (ref 70–99)
GLUCOSE BLDC GLUCOMTR-MCNC: 221 MG/DL — HIGH (ref 70–99)
GLUCOSE BLDC GLUCOMTR-MCNC: 277 MG/DL — HIGH (ref 70–99)
GLUCOSE SERPL-MCNC: 196 MG/DL — HIGH (ref 70–99)
MAGNESIUM SERPL-MCNC: 1.9 MG/DL — SIGNIFICANT CHANGE UP (ref 1.6–2.6)
PHOSPHATE SERPL-MCNC: 2.6 MG/DL — SIGNIFICANT CHANGE UP (ref 2.5–4.5)
POTASSIUM SERPL-MCNC: 4 MMOL/L — SIGNIFICANT CHANGE UP (ref 3.5–5.3)
POTASSIUM SERPL-SCNC: 4 MMOL/L — SIGNIFICANT CHANGE UP (ref 3.5–5.3)
SODIUM SERPL-SCNC: 142 MMOL/L — SIGNIFICANT CHANGE UP (ref 135–145)

## 2019-11-23 RX ORDER — METOPROLOL TARTRATE 50 MG
50 TABLET ORAL
Refills: 0 | Status: DISCONTINUED | OUTPATIENT
Start: 2019-11-23 | End: 2019-11-25

## 2019-11-23 RX ADMIN — Medication 50 MILLIGRAM(S): at 18:39

## 2019-11-23 RX ADMIN — APIXABAN 5 MILLIGRAM(S): 2.5 TABLET, FILM COATED ORAL at 18:40

## 2019-11-23 RX ADMIN — Medication 25 MILLIGRAM(S): at 05:45

## 2019-11-23 RX ADMIN — Medication 1 GRAM(S): at 05:45

## 2019-11-23 RX ADMIN — APIXABAN 5 MILLIGRAM(S): 2.5 TABLET, FILM COATED ORAL at 05:45

## 2019-11-23 RX ADMIN — Medication 3: at 18:39

## 2019-11-23 RX ADMIN — Medication 1 GRAM(S): at 18:39

## 2019-11-23 RX ADMIN — Medication 2: at 08:44

## 2019-11-23 RX ADMIN — PANTOPRAZOLE SODIUM 40 MILLIGRAM(S): 20 TABLET, DELAYED RELEASE ORAL at 05:45

## 2019-11-23 RX ADMIN — Medication 2: at 12:57

## 2019-11-23 NOTE — PROGRESS NOTE ADULT - SUBJECTIVE AND OBJECTIVE BOX
INTERVAL HPI/OVERNIGHT EVENTS:    Pt seen and examined. She denies abdominal pain, n/v.   tolerating PO   + brown stool yesterday, no bm today      MEDICATIONS  (STANDING):  apixaban 5 milliGRAM(s) Oral two times a day  dextrose 5%. 1000 milliLiter(s) (50 mL/Hr) IV Continuous <Continuous>  dextrose 50% Injectable 12.5 Gram(s) IV Push once  dextrose 50% Injectable 25 Gram(s) IV Push once  dextrose 50% Injectable 25 Gram(s) IV Push once  influenza   Vaccine 0.5 milliLiter(s) IntraMuscular once  insulin lispro (HumaLOG) corrective regimen sliding scale   SubCutaneous at bedtime  insulin lispro (HumaLOG) corrective regimen sliding scale   SubCutaneous three times a day before meals  metoprolol tartrate 50 milliGRAM(s) Oral two times a day  pantoprazole    Tablet 40 milliGRAM(s) Oral before breakfast  sucralfate suspension 1 Gram(s) Oral two times a day    MEDICATIONS  (PRN):  dextrose 40% Gel 15 Gram(s) Oral once PRN Blood Glucose LESS THAN 70 milliGRAM(s)/deciliter  glucagon  Injectable 1 milliGRAM(s) IntraMuscular once PRN Glucose LESS THAN 70 milligrams/deciliter  simethicone 80 milliGRAM(s) Chew every 4 hours PRN Gas      Allergies    No Known Allergies    Intolerances        Review of Systems:    General:  No wt loss, fevers, chills, night sweats,fatigue,   Eyes:  Good vision, no reported pain  ENT:  No sore throat, pain, runny nose, dysphagia  CV:  No pain, palpitations, hypo/hypertension  Resp:  No dyspnea, cough, tachypnea, wheezing  GI:  No pain, No nausea, No vomiting, No diarrhea, No constipation, No weight loss, No fever, No pruritis, No rectal bleeding, No melena, No dysphagia  :  No pain, bleeding, incontinence, nocturia  Muscle:  No pain, weakness  Neuro:  No weakness, tingling, memory problems  Psych:  No fatigue, insomnia, mood problems, depression  Endocrine:  No polyuria, polydypsia, cold/heat intolerance  Heme:  No petechiae, ecchymosis, easy bruisability  Skin:  No rash, tattoos, scars, edema      Vital Signs Last 24 Hrs  T(C): 36.5 (23 Nov 2019 05:40), Max: 36.9 (22 Nov 2019 12:13)  T(F): 97.7 (23 Nov 2019 05:40), Max: 98.4 (22 Nov 2019 12:13)  HR: 112 (23 Nov 2019 05:40) (93 - 112)  BP: 137/83 (23 Nov 2019 05:40) (123/64 - 137/83)  BP(mean): --  RR: 18 (23 Nov 2019 05:40) (18 - 20)  SpO2: 97% (23 Nov 2019 05:40) (97% - 97%)    PHYSICAL EXAM:    Constitutional: NAD, well-developed  HEENT: EOMI, throat clear  Neck: No LAD, supple  Respiratory: CTA and P  Cardiovascular: S1 and S2, RRR, no M  Gastrointestinal: BS+, soft, NT/ND, neg HSM,  Extremities: No peripheral edema, neg clubing, cyanosis  Vascular: 2+ peripheral pulses  Neurological: A/O x 3, no focal deficits  Psychiatric: Normal mood, normal affect  Skin: No rashes      LABS:                        12.7   9.20  )-----------( 210      ( 22 Nov 2019 08:21 )             40.8     11-23    142  |  103  |  14  ----------------------------<  196<H>  4.0   |  23  |  0.71    Ca    9.2      23 Nov 2019 07:23  Phos  2.6     11-23  Mg     1.9     11-23            RADIOLOGY & ADDITIONAL TESTS:

## 2019-11-23 NOTE — PROGRESS NOTE ADULT - SUBJECTIVE AND OBJECTIVE BOX
Patient is a 89y old  Female who presents with a chief complaint of weakness (23 Nov 2019 10:05)      INTERVAL HPI/OVERNIGHT EVENTS:  T(C): 37.1 (11-23-19 @ 13:48), Max: 37.1 (11-23-19 @ 13:48)  HR: 109 (11-23-19 @ 13:48) (96 - 112)  BP: 122/75 (11-23-19 @ 13:48) (122/75 - 137/83)  RR: 18 (11-23-19 @ 13:48) (18 - 20)  SpO2: 98% (11-23-19 @ 13:48) (97% - 98%)  Wt(kg): --  I&O's Summary    22 Nov 2019 07:01  -  23 Nov 2019 07:00  --------------------------------------------------------  IN: 900 mL / OUT: 1 mL / NET: 899 mL    23 Nov 2019 07:01  -  23 Nov 2019 17:29  --------------------------------------------------------  IN: 480 mL / OUT: 0 mL / NET: 480 mL        LABS:                        12.7   9.20  )-----------( 210      ( 22 Nov 2019 08:21 )             40.8     11-23    142  |  103  |  14  ----------------------------<  196<H>  4.0   |  23  |  0.71    Ca    9.2      23 Nov 2019 07:23  Phos  2.6     11-23  Mg     1.9     11-23          CAPILLARY BLOOD GLUCOSE      POCT Blood Glucose.: 221 mg/dL (23 Nov 2019 12:56)  POCT Blood Glucose.: 220 mg/dL (23 Nov 2019 08:13)  POCT Blood Glucose.: 267 mg/dL (22 Nov 2019 22:06)            MEDICATIONS  (STANDING):  apixaban 5 milliGRAM(s) Oral two times a day  dextrose 5%. 1000 milliLiter(s) (50 mL/Hr) IV Continuous <Continuous>  dextrose 50% Injectable 12.5 Gram(s) IV Push once  dextrose 50% Injectable 25 Gram(s) IV Push once  dextrose 50% Injectable 25 Gram(s) IV Push once  influenza   Vaccine 0.5 milliLiter(s) IntraMuscular once  insulin lispro (HumaLOG) corrective regimen sliding scale   SubCutaneous at bedtime  insulin lispro (HumaLOG) corrective regimen sliding scale   SubCutaneous three times a day before meals  metoprolol tartrate 50 milliGRAM(s) Oral two times a day  pantoprazole    Tablet 40 milliGRAM(s) Oral before breakfast  sucralfate suspension 1 Gram(s) Oral two times a day    MEDICATIONS  (PRN):  dextrose 40% Gel 15 Gram(s) Oral once PRN Blood Glucose LESS THAN 70 milliGRAM(s)/deciliter  glucagon  Injectable 1 milliGRAM(s) IntraMuscular once PRN Glucose LESS THAN 70 milligrams/deciliter  simethicone 80 milliGRAM(s) Chew every 4 hours PRN Gas          PHYSICAL EXAM:  GENERAL: NAD, well-groomed, well-developed  HEAD:  Atraumatic, Normocephalic  CHEST/LUNG: Clear to percussion bilaterally; No rales, rhonchi, wheezing, or rubs  HEART: Regular rate and rhythm; No murmurs, rubs, or gallops  ABDOMEN: Soft, Nontender, Nondistended; Bowel sounds present  EXTREMITIES:  2+ Peripheral Pulses, No clubbing, cyanosis, or edema  LYMPH: No lymphadenopathy noted  SKIN: No rashes or lesions    Care Discussed with Consultants/Other Providers [ ] YES  [ ] NO

## 2019-11-23 NOTE — PROGRESS NOTE ADULT - SUBJECTIVE AND OBJECTIVE BOX
Subjective: Patient seen and examined. No new events except as noted.     SUBJECTIVE/ROS:  .      MEDICATIONS:  MEDICATIONS  (STANDING):  apixaban 5 milliGRAM(s) Oral two times a day  dextrose 5%. 1000 milliLiter(s) (50 mL/Hr) IV Continuous <Continuous>  dextrose 50% Injectable 12.5 Gram(s) IV Push once  dextrose 50% Injectable 25 Gram(s) IV Push once  dextrose 50% Injectable 25 Gram(s) IV Push once  influenza   Vaccine 0.5 milliLiter(s) IntraMuscular once  insulin lispro (HumaLOG) corrective regimen sliding scale   SubCutaneous at bedtime  insulin lispro (HumaLOG) corrective regimen sliding scale   SubCutaneous three times a day before meals  metoprolol tartrate 25 milliGRAM(s) Oral three times a day  pantoprazole    Tablet 40 milliGRAM(s) Oral before breakfast  sucralfate suspension 1 Gram(s) Oral two times a day      PHYSICAL EXAM:  T(C): 36.5 (11-23-19 @ 05:40), Max: 36.9 (11-22-19 @ 12:13)  HR: 112 (11-23-19 @ 05:40) (93 - 112)  BP: 137/83 (11-23-19 @ 05:40) (123/64 - 137/83)  RR: 18 (11-23-19 @ 05:40) (18 - 20)  SpO2: 97% (11-23-19 @ 05:40) (97% - 97%)  Wt(kg): --  I&O's Summary    22 Nov 2019 07:01  -  23 Nov 2019 07:00  --------------------------------------------------------  IN: 900 mL / OUT: 1 mL / NET: 899 mL            JVP: Normal  Neck: supple  Lung: clear   CV: S1 S2 , Murmur:  Abd: soft  Ext: No edema  neuro: Awake / alert  Psych: flat affect  Skin: normal``    LABS/DATA:    CARDIAC MARKERS:  CARDIAC MARKERS ( 20 Nov 2019 16:21 )  x     / x     / 22 U/L / x     / x                                    12.7   9.20  )-----------( 210      ( 22 Nov 2019 08:21 )             40.8     11-23    142  |  103  |  14  ----------------------------<  196<H>  4.0   |  23  |  0.71    Ca    9.2      23 Nov 2019 07:23  Phos  2.6     11-23  Mg     1.9     11-23      proBNP:   Lipid Profile:   HgA1c:   TSH: Thyroid Stimulating Hormone, Serum: 2.27 uIU/mL (11-22 @ 09:09)      TELE:  EKG:

## 2019-11-24 LAB
GLUCOSE BLDC GLUCOMTR-MCNC: 182 MG/DL — HIGH (ref 70–99)
GLUCOSE BLDC GLUCOMTR-MCNC: 239 MG/DL — HIGH (ref 70–99)
GLUCOSE BLDC GLUCOMTR-MCNC: 243 MG/DL — HIGH (ref 70–99)
GLUCOSE BLDC GLUCOMTR-MCNC: 282 MG/DL — HIGH (ref 70–99)
HCT VFR BLD CALC: 39.5 % — SIGNIFICANT CHANGE UP (ref 34.5–45)
HGB BLD-MCNC: 12.2 G/DL — SIGNIFICANT CHANGE UP (ref 11.5–15.5)
MCHC RBC-ENTMCNC: 27.4 PG — SIGNIFICANT CHANGE UP (ref 27–34)
MCHC RBC-ENTMCNC: 30.9 GM/DL — LOW (ref 32–36)
MCV RBC AUTO: 88.8 FL — SIGNIFICANT CHANGE UP (ref 80–100)
PLATELET # BLD AUTO: 221 K/UL — SIGNIFICANT CHANGE UP (ref 150–400)
RBC # BLD: 4.45 M/UL — SIGNIFICANT CHANGE UP (ref 3.8–5.2)
RBC # FLD: 14.9 % — HIGH (ref 10.3–14.5)
WBC # BLD: 10.13 K/UL — SIGNIFICANT CHANGE UP (ref 3.8–10.5)
WBC # FLD AUTO: 10.13 K/UL — SIGNIFICANT CHANGE UP (ref 3.8–10.5)

## 2019-11-24 PROCEDURE — 72131 CT LUMBAR SPINE W/O DYE: CPT | Mod: 26

## 2019-11-24 RX ORDER — ACETAMINOPHEN 500 MG
650 TABLET ORAL EVERY 6 HOURS
Refills: 0 | Status: DISCONTINUED | OUTPATIENT
Start: 2019-11-24 | End: 2019-11-25

## 2019-11-24 RX ORDER — GABAPENTIN 400 MG/1
100 CAPSULE ORAL AT BEDTIME
Refills: 0 | Status: DISCONTINUED | OUTPATIENT
Start: 2019-11-24 | End: 2019-11-25

## 2019-11-24 RX ADMIN — Medication 1 GRAM(S): at 18:13

## 2019-11-24 RX ADMIN — Medication 1: at 22:32

## 2019-11-24 RX ADMIN — Medication 1: at 08:33

## 2019-11-24 RX ADMIN — Medication 50 MILLIGRAM(S): at 18:13

## 2019-11-24 RX ADMIN — Medication 650 MILLIGRAM(S): at 10:47

## 2019-11-24 RX ADMIN — Medication 650 MILLIGRAM(S): at 22:23

## 2019-11-24 RX ADMIN — Medication 650 MILLIGRAM(S): at 10:17

## 2019-11-24 RX ADMIN — PANTOPRAZOLE SODIUM 40 MILLIGRAM(S): 20 TABLET, DELAYED RELEASE ORAL at 05:41

## 2019-11-24 RX ADMIN — Medication 2: at 13:02

## 2019-11-24 RX ADMIN — APIXABAN 5 MILLIGRAM(S): 2.5 TABLET, FILM COATED ORAL at 18:13

## 2019-11-24 RX ADMIN — Medication 50 MILLIGRAM(S): at 05:41

## 2019-11-24 RX ADMIN — Medication 1 GRAM(S): at 05:41

## 2019-11-24 RX ADMIN — APIXABAN 5 MILLIGRAM(S): 2.5 TABLET, FILM COATED ORAL at 05:41

## 2019-11-24 RX ADMIN — GABAPENTIN 100 MILLIGRAM(S): 400 CAPSULE ORAL at 22:23

## 2019-11-24 RX ADMIN — Medication 2: at 18:12

## 2019-11-24 NOTE — PROGRESS NOTE ADULT - ASSESSMENT
melena    s/p  upper gastrointestinal endoscopy 11/21 with normal esophagus  - Erythematous mucosa in the stomach. Biopsied  - Cont PPI  - Carafate bid  - f/u path  - no objection to a/c  - reg diet

## 2019-11-24 NOTE — PROGRESS NOTE ADULT - SUBJECTIVE AND OBJECTIVE BOX
INTERVAL HPI/OVERNIGHT EVENTS:    Pt seen and examined. She denies abdominal pain, n/v.   tolerating PO     MEDICATIONS  (STANDING):  apixaban 5 milliGRAM(s) Oral two times a day  dextrose 5%. 1000 milliLiter(s) (50 mL/Hr) IV Continuous <Continuous>  dextrose 50% Injectable 12.5 Gram(s) IV Push once  dextrose 50% Injectable 25 Gram(s) IV Push once  dextrose 50% Injectable 25 Gram(s) IV Push once  gabapentin 100 milliGRAM(s) Oral at bedtime  influenza   Vaccine 0.5 milliLiter(s) IntraMuscular once  insulin lispro (HumaLOG) corrective regimen sliding scale   SubCutaneous at bedtime  insulin lispro (HumaLOG) corrective regimen sliding scale   SubCutaneous three times a day before meals  metoprolol tartrate 50 milliGRAM(s) Oral two times a day  pantoprazole    Tablet 40 milliGRAM(s) Oral before breakfast  sucralfate suspension 1 Gram(s) Oral two times a day    MEDICATIONS  (PRN):  acetaminophen   Tablet .. 650 milliGRAM(s) Oral every 6 hours PRN Mild Pain (1 - 3), Moderate Pain (4 - 6)  dextrose 40% Gel 15 Gram(s) Oral once PRN Blood Glucose LESS THAN 70 milliGRAM(s)/deciliter  glucagon  Injectable 1 milliGRAM(s) IntraMuscular once PRN Glucose LESS THAN 70 milligrams/deciliter  simethicone 80 milliGRAM(s) Chew every 4 hours PRN Gas      Allergies    No Known Allergies    Intolerances        Review of Systems:    General:  No wt loss, fevers, chills, night sweats,fatigue,   Eyes:  Good vision, no reported pain  ENT:  No sore throat, pain, runny nose, dysphagia  CV:  No pain, palpitations, hypo/hypertension  Resp:  No dyspnea, cough, tachypnea, wheezing  GI:  No pain, No nausea, No vomiting, No diarrhea, No constipation, No weight loss, No fever, No pruritis, No rectal bleeding, No melena, No dysphagia  :  No pain, bleeding, incontinence, nocturia  Muscle:  No pain, weakness  Neuro:  No weakness, tingling, memory problems  Psych:  No fatigue, insomnia, mood problems, depression  Endocrine:  No polyuria, polydypsia, cold/heat intolerance  Heme:  No petechiae, ecchymosis, easy bruisability  Skin:  No rash, tattoos, scars, edema      Vital Signs Last 24 Hrs  T(C): 36.9 (24 Nov 2019 05:35), Max: 37.1 (23 Nov 2019 13:48)  T(F): 98.4 (24 Nov 2019 05:35), Max: 98.8 (23 Nov 2019 13:48)  HR: 97 (24 Nov 2019 05:35) (97 - 109)  BP: 133/91 (24 Nov 2019 05:35) (122/75 - 147/70)  BP(mean): --  RR: 18 (24 Nov 2019 05:35) (18 - 18)  SpO2: 96% (24 Nov 2019 05:35) (96% - 98%)    PHYSICAL EXAM:    Constitutional: NAD, well-developed  HEENT: EOMI, throat clear  Neck: No LAD, supple  Respiratory: CTA and P  Cardiovascular: S1 and S2, RRR, no M  Gastrointestinal: BS+, soft, NT/ND, neg HSM,  Extremities: No peripheral edema, neg clubing, cyanosis  Vascular: 2+ peripheral pulses  Neurological: A/O x 3, no focal deficits  Psychiatric: Normal mood, normal affect  Skin: No rashes      LABS:                        12.2   10.13 )-----------( 221      ( 24 Nov 2019 10:11 )             39.5     11-23    142  |  103  |  14  ----------------------------<  196<H>  4.0   |  23  |  0.71    Ca    9.2      23 Nov 2019 07:23  Phos  2.6     11-23  Mg     1.9     11-23            RADIOLOGY & ADDITIONAL TESTS:

## 2019-11-24 NOTE — PROGRESS NOTE ADULT - SUBJECTIVE AND OBJECTIVE BOX
Subjective: Patient seen and examined. No new events except as noted.     SUBJECTIVE/ROS:  feels ok       MEDICATIONS:  MEDICATIONS  (STANDING):  apixaban 5 milliGRAM(s) Oral two times a day  dextrose 5%. 1000 milliLiter(s) (50 mL/Hr) IV Continuous <Continuous>  dextrose 50% Injectable 12.5 Gram(s) IV Push once  dextrose 50% Injectable 25 Gram(s) IV Push once  dextrose 50% Injectable 25 Gram(s) IV Push once  gabapentin 100 milliGRAM(s) Oral at bedtime  influenza   Vaccine 0.5 milliLiter(s) IntraMuscular once  insulin lispro (HumaLOG) corrective regimen sliding scale   SubCutaneous at bedtime  insulin lispro (HumaLOG) corrective regimen sliding scale   SubCutaneous three times a day before meals  metoprolol tartrate 50 milliGRAM(s) Oral two times a day  pantoprazole    Tablet 40 milliGRAM(s) Oral before breakfast  sucralfate suspension 1 Gram(s) Oral two times a day      PHYSICAL EXAM:  T(C): 36.9 (11-24-19 @ 05:35), Max: 37.1 (11-23-19 @ 13:48)  HR: 97 (11-24-19 @ 05:35) (97 - 109)  BP: 133/91 (11-24-19 @ 05:35) (122/75 - 147/70)  RR: 18 (11-24-19 @ 05:35) (18 - 18)  SpO2: 96% (11-24-19 @ 05:35) (96% - 98%)  Wt(kg): --  I&O's Summary    23 Nov 2019 07:01  -  24 Nov 2019 07:00  --------------------------------------------------------  IN: 840 mL / OUT: 1 mL / NET: 839 mL            JVP: Normal  Neck: supple  Lung: clear   CV: S1 S2 , Murmur:  Abd: soft  Ext: No edema  neuro: Awake / alert  Psych: flat affect  Skin: normal``    LABS/DATA:    CARDIAC MARKERS:            11-23    142  |  103  |  14  ----------------------------<  196<H>  4.0   |  23  |  0.71    Ca    9.2      23 Nov 2019 07:23  Phos  2.6     11-23  Mg     1.9     11-23      proBNP:   Lipid Profile:   HgA1c:   TSH:     TELE:  EKG:

## 2019-11-24 NOTE — PROGRESS NOTE ADULT - SUBJECTIVE AND OBJECTIVE BOX
[facility]      Seen earlier today, and examined.  - Today, patient is without complaints.           *****MEDICATIONS: Current medication reviewed and documented.    MEDICATIONS  (STANDING):  apixaban 5 milliGRAM(s) Oral two times a day  dextrose 5%. 1000 milliLiter(s) (50 mL/Hr) IV Continuous <Continuous>  dextrose 50% Injectable 12.5 Gram(s) IV Push once  dextrose 50% Injectable 25 Gram(s) IV Push once  dextrose 50% Injectable 25 Gram(s) IV Push once  influenza   Vaccine 0.5 milliLiter(s) IntraMuscular once  insulin lispro (HumaLOG) corrective regimen sliding scale   SubCutaneous at bedtime  insulin lispro (HumaLOG) corrective regimen sliding scale   SubCutaneous three times a day before meals  metoprolol tartrate 50 milliGRAM(s) Oral two times a day  pantoprazole    Tablet 40 milliGRAM(s) Oral before breakfast  sucralfate suspension 1 Gram(s) Oral two times a day    MEDICATIONS  (PRN):  dextrose 40% Gel 15 Gram(s) Oral once PRN Blood Glucose LESS THAN 70 milliGRAM(s)/deciliter  glucagon  Injectable 1 milliGRAM(s) IntraMuscular once PRN Glucose LESS THAN 70 milligrams/deciliter  simethicone 80 milliGRAM(s) Chew every 4 hours PRN Gas          ***** VITAL SIGNS:  T(F): 98.4 (19 @ 05:35), Max: 98.8 (19 @ 13:48)  HR: 97 (19 @ 05:35) (97 - 109)  BP: 133/91 (19 @ 05:35) (122/75 - 147/70)  RR: 18 (19 @ 05:35) (18 - 18)  SpO2: 96% (19 @ 05:35) (96% - 98%)  Wt(kg): --  ,   I&O's Summary    2019 07:01  -  2019 07:00  --------------------------------------------------------  IN: 840 mL / OUT: 1 mL / NET: 839 mL             *****PHYSICAL EXAM:  Alert oriented x 2  Attention comprehension are fair. Able to name, repeat, without any difficulty.   Able to follow 1-2 step commands.     EOMI fundi not visualized,  VFF to confrontration  No facial asymmetry   Tongue is midline   Palate elevates symmetrically   Moving both upper ext symmetrically antigravity   b/l Iliopsoas 2/5   hamstrings 3/5 quad 3/5  dorsiflexion 3/5   patchy areas of numbness.  Gait : not assessed.  B/L down going toes          *****LAB AND IMAGIN-23    142  |  103  |  14  ----------------------------<  196<H>  4.0   |  23  |  0.71    Ca    9.2      2019 07:23  Phos  2.6       Mg     1.9                              < from: CT Abdomen and Pelvis w/ IV Cont (19 @ 22:24) >    No CTA evidence of acute gastrointestinal hemorrhage within the   limitations of a motion and streak degraded exam.      < end of copied text >    [All pertinent recent Imaging/Reports reviewed]           *****A S S E S S M E N T   A N D   P L A N :      89 F PMH Afib eliquis here for generalized weakness x 1 week with positive FOBT at PCP office today; after mechanical fall last week where she was on the ground for a day and a half and was cleaned up by a neighbour. No infectious sxs. No head trauma during that fall. No LOC. Was in PCP's office today for 2 episodes of grossly bloody bowel movements and has low H&H at baseline. Patient feels weak and does not feel safe at home.        Problem/Recommendations 1:  fall mechanical vs. progressive deconditioning vs. microvascular disease   ct head c/w mild microvascular disease   pt eval may benefit from short term rehab   home safety eval  social work consult for home health aide   ct lumbar spine        Problem/Recommendations 2:afib on eliquis with GIB   s/p egd     Thank you for allowing me to participate in the care of this patient. Please do not hesitate to call me if you have any  questions.        ________________  Luana Frederick MD  [facility] ([facility]  [phone]      33 minutes spent on total encounter; more than 50 % of the visit was  spent counseling about plan of care, compliance to diet/exercise and medication regimen and or  coordinating care by the attending physician.      It is advised that stroke patients follow up with KRISTI Bradford @ [phone] in 1- 2 weeks.   Others please follow up with Dr. Michael Nissenbaum [phone] [facility]      Seen earlier today, and examined.  - Today, patient is complaining of pain in her left radiating into the leg        *****MEDICATIONS: Current medication reviewed and documented.    MEDICATIONS  (STANDING):  apixaban 5 milliGRAM(s) Oral two times a day  dextrose 5%. 1000 milliLiter(s) (50 mL/Hr) IV Continuous <Continuous>  dextrose 50% Injectable 12.5 Gram(s) IV Push once  dextrose 50% Injectable 25 Gram(s) IV Push once  dextrose 50% Injectable 25 Gram(s) IV Push once  influenza   Vaccine 0.5 milliLiter(s) IntraMuscular once  insulin lispro (HumaLOG) corrective regimen sliding scale   SubCutaneous at bedtime  insulin lispro (HumaLOG) corrective regimen sliding scale   SubCutaneous three times a day before meals  metoprolol tartrate 50 milliGRAM(s) Oral two times a day  pantoprazole    Tablet 40 milliGRAM(s) Oral before breakfast  sucralfate suspension 1 Gram(s) Oral two times a day    MEDICATIONS  (PRN):  dextrose 40% Gel 15 Gram(s) Oral once PRN Blood Glucose LESS THAN 70 milliGRAM(s)/deciliter  glucagon  Injectable 1 milliGRAM(s) IntraMuscular once PRN Glucose LESS THAN 70 milligrams/deciliter  simethicone 80 milliGRAM(s) Chew every 4 hours PRN Gas          ***** VITAL SIGNS:  T(F): 98.4 (19 @ 05:35), Max: 98.8 (19 @ 13:48)  HR: 97 (19 @ 05:35) (97 - 109)  BP: 133/91 (19 @ 05:35) (122/75 - 147/70)  RR: 18 (19 @ 05:35) (18 - 18)  SpO2: 96% (19 @ 05:35) (96% - 98%)  Wt(kg): --  ,   I&O's Summary    2019 07:01  -  2019 07:00  --------------------------------------------------------  IN: 840 mL / OUT: 1 mL / NET: 839 mL             *****PHYSICAL EXAM:  Alert oriented x 2  Attention comprehension are fair. Able to name, repeat, without any difficulty.   Able to follow 1-2 step commands.     EOMI fundi not visualized,  VFF to confrontration  No facial asymmetry   Tongue is midline   Palate elevates symmetrically   Moving both upper ext symmetrically antigravity   b/l Iliopsoas 2/5   hamstrings 3/5 quad 3/5  dorsiflexion 3/5   patchy areas of numbness.  Gait : not assessed.  B/L down going toes          *****LAB AND IMAGIN-23    142  |  103  |  14  ----------------------------<  196<H>  4.0   |  23  |  0.71    Ca    9.2      2019 07:23  Phos  2.6       Mg     1.9                              < from: CT Abdomen and Pelvis w/ IV Cont (19 @ 22:24) >    No CTA evidence of acute gastrointestinal hemorrhage within the   limitations of a motion and streak degraded exam.      < end of copied text >    [All pertinent recent Imaging/Reports reviewed]           *****A S S E S S M E N T   A N D   P L A N :      89 F PMH Afib eliquis here for generalized weakness x 1 week with positive FOBT at PCP office today; after mechanical fall last week where she was on the ground for a day and a half and was cleaned up by a neighbour. No infectious sxs. No head trauma during that fall. No LOC. Was in PCP's office today for 2 episodes of grossly bloody bowel movements and has low H&H at baseline. Patient feels weak and does not feel safe at home.        Problem/Recommendations 1:  fall mechanical vs. progressive deconditioning vs. microvascular disease   ct head c/w mild microvascular disease   pt eval may benefit from short term rehab   home safety eval  social work consult for home health aide   ct lumbar spine due to new radiculopathy   tylenol po prn for pain       Problem/Recommendations 2:afib on eliquis with GIB   s/p egd     Thank you for allowing me to participate in the care of this patient. Please do not hesitate to call me if you have any  questions.        ________________  Luana Frederick MD  [facility] ([facility]  [phone]      33 minutes spent on total encounter; more than 50 % of the visit was  spent counseling about plan of care, compliance to diet/exercise and medication regimen and or  coordinating care by the attending physician.      It is advised that stroke patients follow up with KRISTI Bradford @ [phone] in 1- 2 weeks.   Others please follow up with Dr. Michael Nissenbaum [phone]

## 2019-11-24 NOTE — PROGRESS NOTE ADULT - SUBJECTIVE AND OBJECTIVE BOX
Patient is a 89y old  Female who presents with a chief complaint of weakness (24 Nov 2019 13:17)      INTERVAL HPI/OVERNIGHT EVENTS:  T(C): 36.8 (11-24-19 @ 14:08), Max: 37 (11-23-19 @ 20:00)  HR: 71 (11-24-19 @ 14:08) (71 - 98)  BP: 128/85 (11-24-19 @ 14:08) (128/85 - 147/70)  RR: 18 (11-24-19 @ 14:08) (18 - 18)  SpO2: 96% (11-24-19 @ 14:08) (96% - 96%)  Wt(kg): --  I&O's Summary    23 Nov 2019 07:01  -  24 Nov 2019 07:00  --------------------------------------------------------  IN: 840 mL / OUT: 1 mL / NET: 839 mL    24 Nov 2019 07:01  -  24 Nov 2019 17:09  --------------------------------------------------------  IN: 360 mL / OUT: 0 mL / NET: 360 mL        LABS:                        12.2   10.13 )-----------( 221      ( 24 Nov 2019 10:11 )             39.5     11-23    142  |  103  |  14  ----------------------------<  196<H>  4.0   |  23  |  0.71    Ca    9.2      23 Nov 2019 07:23  Phos  2.6     11-23  Mg     1.9     11-23          CAPILLARY BLOOD GLUCOSE      POCT Blood Glucose.: 239 mg/dL (24 Nov 2019 12:21)  POCT Blood Glucose.: 182 mg/dL (24 Nov 2019 08:19)  POCT Blood Glucose.: 165 mg/dL (23 Nov 2019 22:00)  POCT Blood Glucose.: 277 mg/dL (23 Nov 2019 17:46)            MEDICATIONS  (STANDING):  apixaban 5 milliGRAM(s) Oral two times a day  dextrose 5%. 1000 milliLiter(s) (50 mL/Hr) IV Continuous <Continuous>  dextrose 50% Injectable 12.5 Gram(s) IV Push once  dextrose 50% Injectable 25 Gram(s) IV Push once  dextrose 50% Injectable 25 Gram(s) IV Push once  gabapentin 100 milliGRAM(s) Oral at bedtime  influenza   Vaccine 0.5 milliLiter(s) IntraMuscular once  insulin lispro (HumaLOG) corrective regimen sliding scale   SubCutaneous at bedtime  insulin lispro (HumaLOG) corrective regimen sliding scale   SubCutaneous three times a day before meals  metoprolol tartrate 50 milliGRAM(s) Oral two times a day  pantoprazole    Tablet 40 milliGRAM(s) Oral before breakfast  sucralfate suspension 1 Gram(s) Oral two times a day    MEDICATIONS  (PRN):  acetaminophen   Tablet .. 650 milliGRAM(s) Oral every 6 hours PRN Mild Pain (1 - 3), Moderate Pain (4 - 6)  dextrose 40% Gel 15 Gram(s) Oral once PRN Blood Glucose LESS THAN 70 milliGRAM(s)/deciliter  glucagon  Injectable 1 milliGRAM(s) IntraMuscular once PRN Glucose LESS THAN 70 milligrams/deciliter  simethicone 80 milliGRAM(s) Chew every 4 hours PRN Gas          PHYSICAL EXAM:  GENERAL: NAD, well-groomed, well-developed  HEAD:  Atraumatic, Normocephalic  CHEST/LUNG: Clear to percussion bilaterally; No rales, rhonchi, wheezing, or rubs  HEART: Regular rate and rhythm; No murmurs, rubs, or gallops  ABDOMEN: Soft, Nontender, Nondistended; Bowel sounds present  EXTREMITIES:  2+ Peripheral Pulses, No clubbing, cyanosis, or edema  LYMPH: No lymphadenopathy noted  SKIN: No rashes or lesions    Care Discussed with Consultants/Other Providers [ ] YES  [ ] NO

## 2019-11-25 ENCOUNTER — TRANSCRIPTION ENCOUNTER (OUTPATIENT)
Age: 84
End: 2019-11-25

## 2019-11-25 VITALS
RESPIRATION RATE: 18 BRPM | DIASTOLIC BLOOD PRESSURE: 76 MMHG | HEART RATE: 94 BPM | SYSTOLIC BLOOD PRESSURE: 113 MMHG | OXYGEN SATURATION: 94 % | TEMPERATURE: 97 F

## 2019-11-25 DIAGNOSIS — Z71.89 OTHER SPECIFIED COUNSELING: ICD-10-CM

## 2019-11-25 DIAGNOSIS — K92.1 MELENA: ICD-10-CM

## 2019-11-25 LAB
GLUCOSE BLDC GLUCOMTR-MCNC: 195 MG/DL — HIGH (ref 70–99)
GLUCOSE BLDC GLUCOMTR-MCNC: 289 MG/DL — HIGH (ref 70–99)
SURGICAL PATHOLOGY STUDY: SIGNIFICANT CHANGE UP

## 2019-11-25 PROCEDURE — 70450 CT HEAD/BRAIN W/O DYE: CPT

## 2019-11-25 PROCEDURE — 85027 COMPLETE CBC AUTOMATED: CPT

## 2019-11-25 PROCEDURE — 81001 URINALYSIS AUTO W/SCOPE: CPT

## 2019-11-25 PROCEDURE — 82803 BLOOD GASES ANY COMBINATION: CPT

## 2019-11-25 PROCEDURE — 84443 ASSAY THYROID STIM HORMONE: CPT

## 2019-11-25 PROCEDURE — 99285 EMERGENCY DEPT VISIT HI MDM: CPT | Mod: 25

## 2019-11-25 PROCEDURE — 93005 ELECTROCARDIOGRAM TRACING: CPT

## 2019-11-25 PROCEDURE — 97161 PT EVAL LOW COMPLEX 20 MIN: CPT

## 2019-11-25 PROCEDURE — 86850 RBC ANTIBODY SCREEN: CPT

## 2019-11-25 PROCEDURE — 84100 ASSAY OF PHOSPHORUS: CPT

## 2019-11-25 PROCEDURE — 83605 ASSAY OF LACTIC ACID: CPT

## 2019-11-25 PROCEDURE — 71045 X-RAY EXAM CHEST 1 VIEW: CPT

## 2019-11-25 PROCEDURE — 82607 VITAMIN B-12: CPT

## 2019-11-25 PROCEDURE — 82947 ASSAY GLUCOSE BLOOD QUANT: CPT

## 2019-11-25 PROCEDURE — 82962 GLUCOSE BLOOD TEST: CPT

## 2019-11-25 PROCEDURE — 83036 HEMOGLOBIN GLYCOSYLATED A1C: CPT

## 2019-11-25 PROCEDURE — 84484 ASSAY OF TROPONIN QUANT: CPT

## 2019-11-25 PROCEDURE — 80048 BASIC METABOLIC PNL TOTAL CA: CPT

## 2019-11-25 PROCEDURE — 84295 ASSAY OF SERUM SODIUM: CPT

## 2019-11-25 PROCEDURE — 88305 TISSUE EXAM BY PATHOLOGIST: CPT

## 2019-11-25 PROCEDURE — 82330 ASSAY OF CALCIUM: CPT

## 2019-11-25 PROCEDURE — 85014 HEMATOCRIT: CPT

## 2019-11-25 PROCEDURE — 80053 COMPREHEN METABOLIC PANEL: CPT

## 2019-11-25 PROCEDURE — 83735 ASSAY OF MAGNESIUM: CPT

## 2019-11-25 PROCEDURE — 82550 ASSAY OF CK (CPK): CPT

## 2019-11-25 PROCEDURE — 82746 ASSAY OF FOLIC ACID SERUM: CPT

## 2019-11-25 PROCEDURE — 82435 ASSAY OF BLOOD CHLORIDE: CPT

## 2019-11-25 PROCEDURE — 74177 CT ABD & PELVIS W/CONTRAST: CPT

## 2019-11-25 PROCEDURE — 88312 SPECIAL STAINS GROUP 1: CPT

## 2019-11-25 PROCEDURE — 84132 ASSAY OF SERUM POTASSIUM: CPT

## 2019-11-25 PROCEDURE — 72131 CT LUMBAR SPINE W/O DYE: CPT

## 2019-11-25 PROCEDURE — 86900 BLOOD TYPING SEROLOGIC ABO: CPT

## 2019-11-25 PROCEDURE — 96375 TX/PRO/DX INJ NEW DRUG ADDON: CPT

## 2019-11-25 PROCEDURE — 86901 BLOOD TYPING SEROLOGIC RH(D): CPT

## 2019-11-25 PROCEDURE — 96374 THER/PROPH/DIAG INJ IV PUSH: CPT | Mod: XU

## 2019-11-25 RX ORDER — METOPROLOL TARTRATE 50 MG
1 TABLET ORAL
Qty: 0 | Refills: 0 | DISCHARGE
Start: 2019-11-25

## 2019-11-25 RX ORDER — ACETAMINOPHEN 500 MG
2 TABLET ORAL
Qty: 0 | Refills: 0 | DISCHARGE
Start: 2019-11-25

## 2019-11-25 RX ORDER — SUCRALFATE 1 G
1 TABLET ORAL
Qty: 0 | Refills: 0 | DISCHARGE
Start: 2019-11-25

## 2019-11-25 RX ORDER — GABAPENTIN 400 MG/1
1 CAPSULE ORAL
Qty: 0 | Refills: 0 | DISCHARGE
Start: 2019-11-25

## 2019-11-25 RX ORDER — SUCRALFATE 1 G
10 TABLET ORAL
Qty: 0 | Refills: 0 | DISCHARGE
Start: 2019-11-25

## 2019-11-25 RX ORDER — SIMETHICONE 80 MG/1
1 TABLET, CHEWABLE ORAL
Qty: 0 | Refills: 0 | DISCHARGE
Start: 2019-11-25

## 2019-11-25 RX ORDER — SIMETHICONE 80 MG/1
80 TABLET, CHEWABLE ORAL EVERY 6 HOURS
Refills: 0 | Status: DISCONTINUED | OUTPATIENT
Start: 2019-11-25 | End: 2019-11-25

## 2019-11-25 RX ORDER — METOPROLOL TARTRATE 50 MG
1 TABLET ORAL
Qty: 0 | Refills: 0 | DISCHARGE

## 2019-11-25 RX ADMIN — PANTOPRAZOLE SODIUM 40 MILLIGRAM(S): 20 TABLET, DELAYED RELEASE ORAL at 06:03

## 2019-11-25 RX ADMIN — Medication 1 GRAM(S): at 06:04

## 2019-11-25 RX ADMIN — APIXABAN 5 MILLIGRAM(S): 2.5 TABLET, FILM COATED ORAL at 06:03

## 2019-11-25 RX ADMIN — SIMETHICONE 80 MILLIGRAM(S): 80 TABLET, CHEWABLE ORAL at 13:47

## 2019-11-25 RX ADMIN — Medication 650 MILLIGRAM(S): at 00:12

## 2019-11-25 RX ADMIN — Medication 1: at 08:32

## 2019-11-25 RX ADMIN — Medication 3: at 12:19

## 2019-11-25 RX ADMIN — Medication 50 MILLIGRAM(S): at 06:04

## 2019-11-25 NOTE — DISCHARGE NOTE NURSING/CASE MANAGEMENT/SOCIAL WORK - PATIENT PORTAL LINK FT
You can access the FollowMyHealth Patient Portal offered by [facility] by registering at the following website: [email or web address]. By joining [facility]’s FollowMyHealth portal, you will also be able to view your health information using other applications (apps) compatible with our system.

## 2019-11-25 NOTE — PROGRESS NOTE ADULT - PROBLEM SELECTOR PLAN 1
- s/p  upper gastrointestinal endoscopy 11/21 with normal esophagus and erythematous mucosa; pathology w/inactive gastritis   - trend h/h; remains stable   - cont protonix and carafate  - no gi objection to a/c for afib   - regular diet  - dc planning per primary team

## 2019-11-25 NOTE — CHART NOTE - NSCHARTNOTEFT_GEN_A_CORE
follow up- pt is cleared by MD for discharge to rehab. discussed discharge medication/follow up.  Shobha Najera(NP)  3 [facility], [phone]

## 2019-11-25 NOTE — PROGRESS NOTE ADULT - ASSESSMENT
afib   cont metoprolol  cont a/c     HTN  stable    GIB  s/p egd  fu with gi     DC planning as per primary team

## 2019-11-25 NOTE — PROGRESS NOTE ADULT - SUBJECTIVE AND OBJECTIVE BOX
INTERVAL HPI/OVERNIGHT EVENTS:    c/o gas discomfort   (+) brown bm yesterday   no n/v  tolerating po intake     MEDICATIONS  (STANDING):  apixaban 5 milliGRAM(s) Oral two times a day  dextrose 5%. 1000 milliLiter(s) (50 mL/Hr) IV Continuous <Continuous>  dextrose 50% Injectable 12.5 Gram(s) IV Push once  dextrose 50% Injectable 25 Gram(s) IV Push once  dextrose 50% Injectable 25 Gram(s) IV Push once  gabapentin 100 milliGRAM(s) Oral at bedtime  influenza   Vaccine 0.5 milliLiter(s) IntraMuscular once  insulin lispro (HumaLOG) corrective regimen sliding scale   SubCutaneous at bedtime  insulin lispro (HumaLOG) corrective regimen sliding scale   SubCutaneous three times a day before meals  metoprolol tartrate 50 milliGRAM(s) Oral two times a day  pantoprazole    Tablet 40 milliGRAM(s) Oral before breakfast  simethicone 80 milliGRAM(s) Chew every 6 hours  sucralfate suspension 1 Gram(s) Oral two times a day    MEDICATIONS  (PRN):  acetaminophen   Tablet .. 650 milliGRAM(s) Oral every 6 hours PRN Mild Pain (1 - 3), Moderate Pain (4 - 6)  aluminum hydroxide/magnesium hydroxide/simethicone Suspension 30 milliLiter(s) Oral every 6 hours PRN Dyspepsia  dextrose 40% Gel 15 Gram(s) Oral once PRN Blood Glucose LESS THAN 70 milliGRAM(s)/deciliter  glucagon  Injectable 1 milliGRAM(s) IntraMuscular once PRN Glucose LESS THAN 70 milligrams/deciliter      Allergies    No Known Allergies    Intolerances        Review of Systems:    General:  No wt loss, fevers, chills, night sweats, fatigue   Eyes:  Good vision, no reported pain  ENT:  No sore throat, pain, runny nose, dysphagia  CV:  No pain, palpitations, hypo/hypertension  Resp:  No dyspnea, cough, tachypnea, wheezing  GI:  No pain, No nausea, No vomiting, No diarrhea, No constipation, No weight loss, No fever, No pruritis, No rectal bleeding, No melena, No dysphagia  :  No pain, bleeding, incontinence, nocturia  Muscle:  No pain, weakness  Neuro:  No weakness, tingling, memory problems  Psych:  No fatigue, insomnia, mood problems, depression  Endocrine:  No polyuria, polydypsia, cold/heat intolerance  Heme:  No petechiae, ecchymosis, easy bruisability  Skin:  No rash, tattoos, scars, edema      Vital Signs Last 24 Hrs  T(C): 36.3 (25 Nov 2019 11:18), Max: 36.8 (24 Nov 2019 14:08)  T(F): 97.4 (25 Nov 2019 11:18), Max: 98.2 (24 Nov 2019 14:08)  HR: 94 (25 Nov 2019 11:18) (71 - 103)  BP: 113/76 (25 Nov 2019 11:18) (113/76 - 138/68)  BP(mean): --  RR: 18 (25 Nov 2019 11:18) (18 - 18)  SpO2: 94% (25 Nov 2019 11:18) (94% - 98%)    PHYSICAL EXAM:    Constitutional: NAD  HEENT: EOMI, throat clear  Neck: No LAD, supple  Respiratory: CTA and P  Cardiovascular: S1 and S2, RRR, no M  Gastrointestinal: BS+, soft, NT/ND, neg HSM,  Extremities: No peripheral edema, neg clubbing, cyanosis  Vascular: 2+ peripheral pulses  Neurological: A/O x 3, no focal deficits  Psychiatric: Normal mood, normal affect  Skin: No rashes      LABS:                        12.2   10.13 )-----------( 221      ( 24 Nov 2019 10:11 )             39.5                 RADIOLOGY & ADDITIONAL TESTS:    Surgical Pathology Report (11.21.19 @ 17:35)    Surgical Pathology Report:   ACCESSION No:  [id number]    SONYA MYERS                   1        Surgical Final Report          Final Diagnosis  Stomach, biopsy  - Gastric antral mucosa with chronic inactive gastritis  - Negative for H. pylori on Warthin-Starry stain    Verified by: Zabrina Peralta M.D.  (Electronic Signature)  Reported on: 11/25/19 10:25 [facility], [address] [place] [address]  Phone: [phone]   Fax: [phone]  _________________________________________________________________    Clinical History  gastritis    Specimen(s) Submitted  A.1  Stomach biopsy    Gross Description  1.  The specimen is received in formalin and the specimen  container is labeled: Stomach.  It consists of a 0.3 x 0.2 cm in  greatest dimension fragment of soft, tan-pink tissue.  Special  stains are requested.  Entirely submitted.  One cassette.    In addition to other data that may appear on the specimen  container, the label has been inspected to confirm the presence  of the patient's name and date of birth.    Maricruz Prado 11/22/2019 07:38

## 2019-11-25 NOTE — PROGRESS NOTE ADULT - PROBLEM SELECTOR PLAN 3
Advanced care planning was discussed with patient and family.  Advanced care planning forms were reviewed and discussed.  Risks, benefits and alternatives of gastroenterologic procedures were discussed in detail and all questions were answered.  30 minutes spent.

## 2019-11-25 NOTE — PROGRESS NOTE ADULT - SUBJECTIVE AND OBJECTIVE BOX
Subjective: Patient seen and examined. No new events except as noted.     SUBJECTIVE/ROS:  NAD      MEDICATIONS:  MEDICATIONS  (STANDING):  apixaban 5 milliGRAM(s) Oral two times a day  dextrose 5%. 1000 milliLiter(s) (50 mL/Hr) IV Continuous <Continuous>  dextrose 50% Injectable 12.5 Gram(s) IV Push once  dextrose 50% Injectable 25 Gram(s) IV Push once  dextrose 50% Injectable 25 Gram(s) IV Push once  gabapentin 100 milliGRAM(s) Oral at bedtime  influenza   Vaccine 0.5 milliLiter(s) IntraMuscular once  insulin lispro (HumaLOG) corrective regimen sliding scale   SubCutaneous at bedtime  insulin lispro (HumaLOG) corrective regimen sliding scale   SubCutaneous three times a day before meals  metoprolol tartrate 50 milliGRAM(s) Oral two times a day  pantoprazole    Tablet 40 milliGRAM(s) Oral before breakfast  sucralfate suspension 1 Gram(s) Oral two times a day      PHYSICAL EXAM:  T(C): 36.7 (11-25-19 @ 06:01), Max: 36.8 (11-24-19 @ 14:08)  HR: 97 (11-25-19 @ 06:01) (71 - 103)  BP: 138/68 (11-25-19 @ 06:01) (120/74 - 138/68)  RR: 18 (11-25-19 @ 06:01) (18 - 18)  SpO2: 98% (11-25-19 @ 06:01) (94% - 98%)  Wt(kg): --  I&O's Summary    24 Nov 2019 07:01  -  25 Nov 2019 07:00  --------------------------------------------------------  IN: 600 mL / OUT: 0 mL / NET: 600 mL            JVP: Normal  Neck: supple  Lung: clear   CV: S1 S2 , Murmur:  Abd: soft  Ext: No edema  neuro: Awake / alert  Psych: flat affect  Skin: normal``    LABS/DATA:    CARDIAC MARKERS:                                12.2   10.13 )-----------( 221      ( 24 Nov 2019 10:11 )             39.5           proBNP:   Lipid Profile:   HgA1c:   TSH:     TELE:  EKG:

## 2021-04-02 ENCOUNTER — INPATIENT (INPATIENT)
Facility: HOSPITAL | Age: 86
LOS: 4 days | Discharge: HOME HEALTH | DRG: 177 | End: 2021-04-07
Attending: INTERNAL MEDICINE | Admitting: INTERNAL MEDICINE
Payer: MEDICARE

## 2021-04-02 VITALS
OXYGEN SATURATION: 96 % | HEIGHT: 61 IN | DIASTOLIC BLOOD PRESSURE: 83 MMHG | TEMPERATURE: 98 F | RESPIRATION RATE: 20 BRPM | HEART RATE: 105 BPM | SYSTOLIC BLOOD PRESSURE: 142 MMHG | WEIGHT: 134.92 LBS

## 2021-04-02 DIAGNOSIS — U07.1 COVID-19: ICD-10-CM

## 2021-04-02 PROBLEM — I25.10 ATHEROSCLEROTIC HEART DISEASE OF NATIVE CORONARY ARTERY WITHOUT ANGINA PECTORIS: Chronic | Status: ACTIVE | Noted: 2019-11-19

## 2021-04-02 PROBLEM — Z95.5 PRESENCE OF CORONARY ANGIOPLASTY IMPLANT AND GRAFT: Chronic | Status: ACTIVE | Noted: 2019-11-19

## 2021-04-02 PROBLEM — I48.91 UNSPECIFIED ATRIAL FIBRILLATION: Chronic | Status: ACTIVE | Noted: 2019-11-19

## 2021-04-02 PROBLEM — E11.9 TYPE 2 DIABETES MELLITUS WITHOUT COMPLICATIONS: Chronic | Status: ACTIVE | Noted: 2019-11-19

## 2021-04-02 LAB
ALBUMIN SERPL ELPH-MCNC: 3.8 G/DL — SIGNIFICANT CHANGE UP (ref 3.3–5)
ALBUMIN SERPL ELPH-MCNC: 4.1 G/DL
ALP BLD-CCNC: 80 U/L
ALP SERPL-CCNC: 70 U/L — SIGNIFICANT CHANGE UP (ref 40–120)
ALT FLD-CCNC: 14 U/L — SIGNIFICANT CHANGE UP (ref 10–45)
ALT SERPL-CCNC: 9 U/L
ANION GAP SERPL CALC-SCNC: 11 MMOL/L — SIGNIFICANT CHANGE UP (ref 5–17)
ANION GAP SERPL CALC-SCNC: 13 MMOL/L — SIGNIFICANT CHANGE UP (ref 5–17)
ANION GAP SERPL CALC-SCNC: 15 MMOL/L
APPEARANCE UR: CLEAR — SIGNIFICANT CHANGE UP
APTT BLD: 40.7 SEC — HIGH (ref 27.5–35.5)
AST SERPL-CCNC: 19 U/L
AST SERPL-CCNC: 72 U/L — HIGH (ref 10–40)
BACTERIA # UR AUTO: NEGATIVE — SIGNIFICANT CHANGE UP
BASOPHILS # BLD AUTO: 0.01 K/UL — SIGNIFICANT CHANGE UP (ref 0–0.2)
BASOPHILS # BLD AUTO: 0.03 K/UL
BASOPHILS NFR BLD AUTO: 0.2 % — SIGNIFICANT CHANGE UP (ref 0–2)
BASOPHILS NFR BLD AUTO: 0.5 %
BILIRUB SERPL-MCNC: 0.5 MG/DL — SIGNIFICANT CHANGE UP (ref 0.2–1.2)
BILIRUB SERPL-MCNC: 0.6 MG/DL
BILIRUB UR-MCNC: NEGATIVE — SIGNIFICANT CHANGE UP
BUN SERPL-MCNC: 15 MG/DL
BUN SERPL-MCNC: 20 MG/DL — SIGNIFICANT CHANGE UP (ref 7–23)
BUN SERPL-MCNC: 20 MG/DL — SIGNIFICANT CHANGE UP (ref 7–23)
CALCIUM SERPL-MCNC: 8.9 MG/DL — SIGNIFICANT CHANGE UP (ref 8.4–10.5)
CALCIUM SERPL-MCNC: 9 MG/DL — SIGNIFICANT CHANGE UP (ref 8.4–10.5)
CALCIUM SERPL-MCNC: 9.1 MG/DL
CHLORIDE SERPL-SCNC: 101 MMOL/L — SIGNIFICANT CHANGE UP (ref 96–108)
CHLORIDE SERPL-SCNC: 98 MMOL/L
CHLORIDE SERPL-SCNC: 98 MMOL/L — SIGNIFICANT CHANGE UP (ref 96–108)
CO2 SERPL-SCNC: 23 MMOL/L — SIGNIFICANT CHANGE UP (ref 22–31)
CO2 SERPL-SCNC: 24 MMOL/L — SIGNIFICANT CHANGE UP (ref 22–31)
CO2 SERPL-SCNC: 26 MMOL/L
COLOR SPEC: YELLOW — SIGNIFICANT CHANGE UP
COMMENT - URINE: SIGNIFICANT CHANGE UP
CREAT SERPL-MCNC: 0.8 MG/DL — SIGNIFICANT CHANGE UP (ref 0.5–1.3)
CREAT SERPL-MCNC: 0.88 MG/DL — SIGNIFICANT CHANGE UP (ref 0.5–1.3)
CREAT SERPL-MCNC: 0.93 MG/DL
CRP SERPL-MCNC: 9 MG/L
DEPRECATED D DIMER PPP IA-ACNC: 184 NG/ML DDU
DIFF PNL FLD: NEGATIVE — SIGNIFICANT CHANGE UP
EOSINOPHIL # BLD AUTO: 0.03 K/UL — SIGNIFICANT CHANGE UP (ref 0–0.5)
EOSINOPHIL # BLD AUTO: 0.04 K/UL
EOSINOPHIL NFR BLD AUTO: 0.5 % — SIGNIFICANT CHANGE UP (ref 0–6)
EOSINOPHIL NFR BLD AUTO: 0.6 %
EPI CELLS # UR: 1 — SIGNIFICANT CHANGE UP
FERRITIN SERPL-MCNC: 25 NG/ML
FERRITIN SERPL-MCNC: 31 NG/ML — SIGNIFICANT CHANGE UP (ref 15–150)
GLUCOSE BLDC GLUCOMTR-MCNC: 130 MG/DL — HIGH (ref 70–99)
GLUCOSE SERPL-MCNC: 175 MG/DL — HIGH (ref 70–99)
GLUCOSE SERPL-MCNC: 191 MG/DL
GLUCOSE SERPL-MCNC: 210 MG/DL — HIGH (ref 70–99)
GLUCOSE UR QL: NEGATIVE — SIGNIFICANT CHANGE UP
HCT VFR BLD CALC: 32.7 % — LOW (ref 34.5–45)
HCT VFR BLD CALC: 34.2 %
HGB BLD-MCNC: 9.6 G/DL — LOW (ref 11.5–15.5)
HGB BLD-MCNC: 9.8 G/DL
HYALINE CASTS # UR AUTO: 0 /LPF — SIGNIFICANT CHANGE UP (ref 0–7)
IMM GRANULOCYTES NFR BLD AUTO: 0.3 %
IMM GRANULOCYTES NFR BLD AUTO: 0.5 % — SIGNIFICANT CHANGE UP (ref 0–1.5)
INR BLD: 2.12 RATIO — HIGH (ref 0.88–1.16)
KETONES UR-MCNC: NEGATIVE — SIGNIFICANT CHANGE UP
LEUKOCYTE ESTERASE UR-ACNC: NEGATIVE — SIGNIFICANT CHANGE UP
LYMPHOCYTES # BLD AUTO: 1.01 K/UL — SIGNIFICANT CHANGE UP (ref 1–3.3)
LYMPHOCYTES # BLD AUTO: 1.13 K/UL
LYMPHOCYTES # BLD AUTO: 16.9 % — SIGNIFICANT CHANGE UP (ref 13–44)
LYMPHOCYTES NFR BLD AUTO: 17.1 %
MAGNESIUM SERPL-MCNC: 2 MG/DL — SIGNIFICANT CHANGE UP (ref 1.6–2.6)
MAN DIFF?: NORMAL
MCHC RBC-ENTMCNC: 25.1 PG
MCHC RBC-ENTMCNC: 25.1 PG — LOW (ref 27–34)
MCHC RBC-ENTMCNC: 28.7 GM/DL
MCHC RBC-ENTMCNC: 29.4 GM/DL — LOW (ref 32–36)
MCV RBC AUTO: 85.6 FL — SIGNIFICANT CHANGE UP (ref 80–100)
MCV RBC AUTO: 87.5 FL
MONOCYTES # BLD AUTO: 0.71 K/UL — SIGNIFICANT CHANGE UP (ref 0–0.9)
MONOCYTES # BLD AUTO: 0.86 K/UL
MONOCYTES NFR BLD AUTO: 11.9 % — SIGNIFICANT CHANGE UP (ref 2–14)
MONOCYTES NFR BLD AUTO: 13 %
NEUTROPHILS # BLD AUTO: 4.19 K/UL — SIGNIFICANT CHANGE UP (ref 1.8–7.4)
NEUTROPHILS # BLD AUTO: 4.54 K/UL
NEUTROPHILS NFR BLD AUTO: 68.5 %
NEUTROPHILS NFR BLD AUTO: 70 % — SIGNIFICANT CHANGE UP (ref 43–77)
NITRITE UR-MCNC: NEGATIVE — SIGNIFICANT CHANGE UP
NRBC # BLD: 0 /100 WBCS — SIGNIFICANT CHANGE UP (ref 0–0)
PH UR: 6.5 — SIGNIFICANT CHANGE UP (ref 5–8)
PLATELET # BLD AUTO: 207 K/UL — SIGNIFICANT CHANGE UP (ref 150–400)
PLATELET # BLD AUTO: 244 K/UL
POTASSIUM SERPL-MCNC: 4.8 MMOL/L — SIGNIFICANT CHANGE UP (ref 3.5–5.3)
POTASSIUM SERPL-MCNC: 7.1 MMOL/L — CRITICAL HIGH (ref 3.5–5.3)
POTASSIUM SERPL-SCNC: 4.7 MMOL/L
POTASSIUM SERPL-SCNC: 4.8 MMOL/L — SIGNIFICANT CHANGE UP (ref 3.5–5.3)
POTASSIUM SERPL-SCNC: 7.1 MMOL/L — CRITICAL HIGH (ref 3.5–5.3)
PROCALCITONIN SERPL-MCNC: 0.09 NG/ML
PROT SERPL-MCNC: 6.7 G/DL
PROT SERPL-MCNC: 7.3 G/DL — SIGNIFICANT CHANGE UP (ref 6–8.3)
PROT UR-MCNC: NEGATIVE — SIGNIFICANT CHANGE UP
PROTHROM AB SERPL-ACNC: 24.6 SEC — HIGH (ref 10.6–13.6)
RAPID RVP RESULT: DETECTED
RBC # BLD: 3.82 M/UL — SIGNIFICANT CHANGE UP (ref 3.8–5.2)
RBC # BLD: 3.91 M/UL
RBC # FLD: 16.1 % — HIGH (ref 10.3–14.5)
RBC # FLD: 16.5 %
RBC CASTS # UR COMP ASSIST: 1 /HPF — SIGNIFICANT CHANGE UP (ref 0–4)
SARS-COV-2 RNA PNL RESP NAA+PROBE: DETECTED
SODIUM SERPL-SCNC: 132 MMOL/L — LOW (ref 135–145)
SODIUM SERPL-SCNC: 138 MMOL/L — SIGNIFICANT CHANGE UP (ref 135–145)
SODIUM SERPL-SCNC: 140 MMOL/L
SP GR SPEC: 1.01 — SIGNIFICANT CHANGE UP (ref 1.01–1.02)
TROPONIN T, HIGH SENSITIVITY RESULT: 28 NG/L — SIGNIFICANT CHANGE UP (ref 0–51)
UROBILINOGEN FLD QL: NEGATIVE — SIGNIFICANT CHANGE UP
WBC # BLD: 5.98 K/UL — SIGNIFICANT CHANGE UP (ref 3.8–10.5)
WBC # FLD AUTO: 5.98 K/UL — SIGNIFICANT CHANGE UP (ref 3.8–10.5)
WBC # FLD AUTO: 6.62 K/UL
WBC UR QL: 1 /HPF — SIGNIFICANT CHANGE UP (ref 0–5)

## 2021-04-02 PROCEDURE — 99285 EMERGENCY DEPT VISIT HI MDM: CPT | Mod: CS

## 2021-04-02 PROCEDURE — 71045 X-RAY EXAM CHEST 1 VIEW: CPT | Mod: 26

## 2021-04-02 PROCEDURE — 99221 1ST HOSP IP/OBS SF/LOW 40: CPT | Mod: CS

## 2021-04-02 RX ORDER — DEXTROSE 50 % IN WATER 50 %
25 SYRINGE (ML) INTRAVENOUS ONCE
Refills: 0 | Status: DISCONTINUED | OUTPATIENT
Start: 2021-04-02 | End: 2021-04-07

## 2021-04-02 RX ORDER — GABAPENTIN 400 MG/1
100 CAPSULE ORAL AT BEDTIME
Refills: 0 | Status: DISCONTINUED | OUTPATIENT
Start: 2021-04-02 | End: 2021-04-07

## 2021-04-02 RX ORDER — SODIUM CHLORIDE 9 MG/ML
1000 INJECTION, SOLUTION INTRAVENOUS
Refills: 0 | Status: DISCONTINUED | OUTPATIENT
Start: 2021-04-02 | End: 2021-04-07

## 2021-04-02 RX ORDER — PANTOPRAZOLE SODIUM 20 MG/1
40 TABLET, DELAYED RELEASE ORAL
Refills: 0 | Status: DISCONTINUED | OUTPATIENT
Start: 2021-04-02 | End: 2021-04-07

## 2021-04-02 RX ORDER — INSULIN LISPRO 100/ML
VIAL (ML) SUBCUTANEOUS
Refills: 0 | Status: DISCONTINUED | OUTPATIENT
Start: 2021-04-02 | End: 2021-04-07

## 2021-04-02 RX ORDER — METOPROLOL TARTRATE 50 MG
50 TABLET ORAL DAILY
Refills: 0 | Status: DISCONTINUED | OUTPATIENT
Start: 2021-04-02 | End: 2021-04-07

## 2021-04-02 RX ORDER — PANTOPRAZOLE SODIUM 20 MG/1
1 TABLET, DELAYED RELEASE ORAL
Qty: 0 | Refills: 0 | DISCHARGE

## 2021-04-02 RX ORDER — DEXTROSE 50 % IN WATER 50 %
12.5 SYRINGE (ML) INTRAVENOUS ONCE
Refills: 0 | Status: DISCONTINUED | OUTPATIENT
Start: 2021-04-02 | End: 2021-04-07

## 2021-04-02 RX ORDER — ACETAMINOPHEN 500 MG
650 TABLET ORAL EVERY 6 HOURS
Refills: 0 | Status: DISCONTINUED | OUTPATIENT
Start: 2021-04-02 | End: 2021-04-07

## 2021-04-02 RX ORDER — REMDESIVIR 5 MG/ML
INJECTION INTRAVENOUS
Refills: 0 | Status: COMPLETED | OUTPATIENT
Start: 2021-04-02 | End: 2021-04-06

## 2021-04-02 RX ORDER — REMDESIVIR 5 MG/ML
200 INJECTION INTRAVENOUS EVERY 24 HOURS
Refills: 0 | Status: COMPLETED | OUTPATIENT
Start: 2021-04-02 | End: 2021-04-02

## 2021-04-02 RX ORDER — GLUCAGON INJECTION, SOLUTION 0.5 MG/.1ML
1 INJECTION, SOLUTION SUBCUTANEOUS ONCE
Refills: 0 | Status: DISCONTINUED | OUTPATIENT
Start: 2021-04-02 | End: 2021-04-07

## 2021-04-02 RX ORDER — ACETAMINOPHEN 500 MG
650 TABLET ORAL ONCE
Refills: 0 | Status: COMPLETED | OUTPATIENT
Start: 2021-04-02 | End: 2021-04-02

## 2021-04-02 RX ORDER — METFORMIN HYDROCHLORIDE 850 MG/1
1 TABLET ORAL
Qty: 0 | Refills: 0 | DISCHARGE

## 2021-04-02 RX ORDER — SITAGLIPTIN 50 MG/1
1 TABLET, FILM COATED ORAL
Qty: 0 | Refills: 0 | DISCHARGE

## 2021-04-02 RX ORDER — DEXTROSE 50 % IN WATER 50 %
15 SYRINGE (ML) INTRAVENOUS ONCE
Refills: 0 | Status: DISCONTINUED | OUTPATIENT
Start: 2021-04-02 | End: 2021-04-07

## 2021-04-02 RX ORDER — REMDESIVIR 5 MG/ML
100 INJECTION INTRAVENOUS EVERY 24 HOURS
Refills: 0 | Status: COMPLETED | OUTPATIENT
Start: 2021-04-03 | End: 2021-04-06

## 2021-04-02 RX ORDER — OMEPRAZOLE 10 MG/1
1 CAPSULE, DELAYED RELEASE ORAL
Qty: 0 | Refills: 0 | DISCHARGE

## 2021-04-02 RX ORDER — ATORVASTATIN CALCIUM 80 MG/1
10 TABLET, FILM COATED ORAL AT BEDTIME
Refills: 0 | Status: DISCONTINUED | OUTPATIENT
Start: 2021-04-02 | End: 2021-04-07

## 2021-04-02 RX ORDER — APIXABAN 2.5 MG/1
5 TABLET, FILM COATED ORAL
Refills: 0 | Status: DISCONTINUED | OUTPATIENT
Start: 2021-04-02 | End: 2021-04-06

## 2021-04-02 RX ORDER — SUCRALFATE 1 G
1 TABLET ORAL
Refills: 0 | Status: DISCONTINUED | OUTPATIENT
Start: 2021-04-02 | End: 2021-04-07

## 2021-04-02 RX ADMIN — GABAPENTIN 100 MILLIGRAM(S): 400 CAPSULE ORAL at 21:19

## 2021-04-02 RX ADMIN — REMDESIVIR 500 MILLIGRAM(S): 5 INJECTION INTRAVENOUS at 20:32

## 2021-04-02 RX ADMIN — Medication 650 MILLIGRAM(S): at 16:32

## 2021-04-02 RX ADMIN — Medication 650 MILLIGRAM(S): at 23:01

## 2021-04-02 RX ADMIN — ATORVASTATIN CALCIUM 10 MILLIGRAM(S): 80 TABLET, FILM COATED ORAL at 21:19

## 2021-04-02 RX ADMIN — APIXABAN 5 MILLIGRAM(S): 2.5 TABLET, FILM COATED ORAL at 21:00

## 2021-04-02 RX ADMIN — SODIUM CHLORIDE 50 MILLILITER(S): 9 INJECTION, SOLUTION INTRAVENOUS at 21:37

## 2021-04-02 RX ADMIN — Medication 650 MILLIGRAM(S): at 14:08

## 2021-04-02 NOTE — H&P ADULT - ASSESSMENT
89 y/o F PMHx of CAD, DM, HLD, GERD, Afib on Eliquis, CHF (pt reports being on "water pills"), COVID-19 (+) on 4/1 presents to ED . BIBEMS.  She is afebrile and breathing comfortably on room air but has definite crackles at the left base and multiple risk factors for progression to  COVID pneumonia. seen by ID in the ED        ID- COVID pneumonia:  follow up on CXR- get chest CT  blood cultures x2   trend inflammatory markers for COVID: ferritin, CRP, D-dimer  check procalcitonin  supplemental oxygen as needed  start remdesivir protocol  monitor creatinine, monitor LFTs  can hold off on dexamethasone unless markers elevated    Diabetes Mellitus:  monitor glucose  baseline meds- sliding scale    CAD :cont outptn meds, check TTE    DVT ppx not necessary, ptn is on full AC w Eliquis  GI ppx w po PPI

## 2021-04-02 NOTE — ED PROVIDER NOTE - OBJECTIVE STATEMENT
89 y/o F PMHx of CAD, DM, HLD, GERD, Afib on Eliquis, ?CHF (pt reports being on "water pills"), COVID-19 (+) yesterday presents to ED BIBEMS for complaint of "they told me I had to go to the hospital". Pt reports she experienced nausea, cough, and SOB "a couple days ago" but reports feeling improved currently. Pt also reports poor appetite and poor PO intake, but states she has experienced this for a "long time" before. Pt denies abd pain, vomiting, current nausea, diarrhea, melena, hematochezia, dysuria, CP, current SOB, nasal congestion, sore throat, HA, f/c. Pt states she did not take Tylenol this morning.

## 2021-04-02 NOTE — ED ADULT TRIAGE NOTE - CHIEF COMPLAINT QUOTE
Patient c/o cough and nasal congestion; COVID + 4/1/2021  As per EMS, patient advised by PMD to come to ED for evaluation

## 2021-04-02 NOTE — ED PROVIDER NOTE - PHYSICAL EXAMINATION
GEN: Pt in NAD, non-toxic, alert, oriented x2 (did not know date).  PSYCH: Affect appropriate.  EYES: Sclera white w/o injection.   ENT: No dysphonia. MMM, uvula midline, no vesicles or exudates on posterior pharynx. Neck supple FROM. Trachea midline.   RESP: No evidence of respiratory distress, unlabored breathing, speaking in full sentences, SpO2 98% on RA. Bibasilar fine rales, otherwise CTAB. No wheezing.  CARDIAC: Irregular rhythm, rate fluctuates from normal to mild tachycardia (100s up to 120s lasting seconds), clear distinct S1, S2, no appreciable murmurs.  ABD: Abdomen soft, non-tender.  VASC: 2+ radial and dorsalis pedis pulses b/l. No edema or calf tenderness.  SKIN: No rashes on the trunk.

## 2021-04-02 NOTE — H&P ADULT - NSHPPHYSICALEXAM_GEN_ALL_CORE
T(F): 98.5 (04-02-21 @ 18:04), Max: 98.5 (04-02-21 @ 18:04)  HR: 88 (04-02-21 @ 18:04) (88 - 105)  BP: 140/72 (04-02-21 @ 18:04) (135/77 - 142/83)  RR: 19 (04-02-21 @ 18:04) (19 - 20)  SpO2: 96% (04-02-21 @ 18:04) (96% - 99%)    PHYSICAL EXAM:  GENERAL: NAD, well-developed  HEAD:  Atraumatic, Normocephalic  EYES: EOMI, PERRLA, conjunctiva and sclera clear  NECK: Supple, No JVD  CHEST/LUNG: Clear to auscultation bilaterally; No wheeze  HEART: Regular rate and rhythm; No murmurs, rubs, or gallops  ABDOMEN: Soft, Nontender, Nondistended; Bowel sounds present  EXTREMITIES:  2+ Peripheral Pulses, No clubbing, cyanosis, or edema  PSYCH: AAOx3  NEUROLOGY: non-focal  SKIN: No rashes or lesions

## 2021-04-02 NOTE — H&P ADULT - HISTORY OF PRESENT ILLNESS
89 y/o F PMHx of CAD, DM, HLD, GERD, Afib on Eliquis, ?CHF (pt reports being on "water pills"), COVID-19 (+) yesterday presents to ED BIBEMS for complaint of "they told me I had to go to the hospital". Pt reports she experienced nausea, cough, and SOB "a couple days ago" but reports feeling improved currently. Pt also reports poor appetite and poor PO intake, but states she has experienced this for a "long time" before. Pt denies abd pain, vomiting, current nausea, diarrhea, melena, hematochezia, dysuria, CP, current SOB, nasal congestion, sore throat, HA, f/c. Pt states she did not take Tylenol this morning. ptn lives alone and needs help w all ADLs due to acute COVID. Cristal is the caretaker and feels ptn should be admitted

## 2021-04-02 NOTE — ED PROVIDER NOTE - ATTENDING CONTRIBUTION TO CARE
RGUJRAL 89yo f hx listed s/p COVID exposure by her aide was tested positive yesterday. Patient complains of some nasal congestion and cough. No sob, chest pain, palpitations. States she was told to come to the hospital. Spoke to family, states pt is unable to perform ADLs on her own and has no assistance without aide and will require admission.   On exam, Patient is awake,alert,oriented x 3. Patient is well appearing and in no acute distress. Patient's chest is clear to ausculation, +s1s2. Abdomen is soft nd/nt +BS. Extremity with no swelling or calf tenderness.   Pt with history of Afib, HR improved to 80s once patient settled   Labs and xray chest.

## 2021-04-02 NOTE — ED PROVIDER NOTE - CARE PLAN
Principal Discharge DX:	COVID-19   Principal Discharge DX:	COVID-19  Secondary Diagnosis:	Atrial fibrillation with RVR

## 2021-04-02 NOTE — ED ADULT NURSE NOTE - INTERVENTIONS DEFINITIONS
[place] to call system/Call bell, personal items and telephone within reach/Instruct patient to call for assistance/Room bathroom lighting operational/Non-slip footwear when patient is off stretcher/Physically safe environment: no spills, clutter or unnecessary equipment/Provide visual cue, wrist band, yellow gown, etc.

## 2021-04-02 NOTE — ED ADULT NURSE NOTE - OBJECTIVE STATEMENT
89yo F, aaox3, from home. bibems. ambulatory with walker. states she was c/o nasal congestion, cough, which has subsided. spoke with tele doc after covid test came back positive and MD encouraged pt to come to ED> pt denies pain , SOB, chills, diarrhea at this time. presents well appearing, good skin color. verbal in full sentences. does not appear dyspneic, in pain or distressed. iso prec. in place. sat high 90s on RA. CM placed. assessment ongoing

## 2021-04-02 NOTE — ED ADULT NURSE REASSESSMENT NOTE - NS ED NURSE REASSESS COMMENT FT1
no change in pt presentation, remains awake, alert. sitting upright. adm for further eval, as pts HHA is covid pos and family does not have proper home care replacement at the moment

## 2021-04-02 NOTE — ED PROVIDER NOTE - PROGRESS NOTE DETAILS
d/w pt niece Cristal Wallace with pt permission, Cristal states that pt caregiver has COVID and gave it to the pt. She reports pt has had cough, fatigue, and no appetite. Cristal states that she spoke to Dr. La from ID and she would like pt in the hospital. As per Cristal pt does not have a condition that she was previously followed by ID for. Cristal states pt should be admitted as she lives alone and does not have anyone to care for or monitor at home. Cristal states she will text Dr. Lovett my spectra number for call back to discuss.  Home meds: Eliquis 5mg BID, metoprolol 50mg QD, metformin 850mg BID, uncertain dose of glyburide, Januvia, sucralfate, pantoprazole, atorvastatin, Neurontin. -Justin Yates PA-C d/w ID Dr. La, will see pt in ED, pt likely to be started on Remdesivir. ID and pt family confirm pt has no home care currently due to COVID+ status. -Justin Yates PA-C

## 2021-04-02 NOTE — ED PROVIDER NOTE - CLINICAL SUMMARY MEDICAL DECISION MAKING FREE TEXT BOX
89 y/o F PMHx of CAD, DM, HLD, GERD, Afib on Eliquis, ?CHF (pt reports taking a "water pill") presents COVID+ since yesterday, caregiver has sxs and pt lives alone. Pt with mild sxs of cough, occasional SOB and nausea, lack of appetite with poor PO intake (acute on chronic). SpO2 98% on RA, no evidence of respiratory distress, speaking in full sentences, bibasilar rales otherwise CTAB. Irregular rhythm with rate fluctuating between normal with seconds of RVR 100s-120. d/w pt niece who sent pt to ED for admission and reports pt to be evaluated by ID. Plan for labs including troponin, CXR, UA/UCx, cautious with IV fluids given pt with bibasilar rales and possible h/o CHF. Will reassess and attempt to d/w ID physician who referred pt to ED. Pt does not with hypoxemic respiratory failure, but may require admission if other metabolic derangements, co-infection, or if no home care due to COVID+ status. -Justin Yates PA-C

## 2021-04-03 LAB
A1C WITH ESTIMATED AVERAGE GLUCOSE RESULT: 7.2 % — HIGH (ref 4–5.6)
ALBUMIN SERPL ELPH-MCNC: 3.5 G/DL — SIGNIFICANT CHANGE UP (ref 3.3–5)
ALP SERPL-CCNC: 69 U/L — SIGNIFICANT CHANGE UP (ref 40–120)
ALT FLD-CCNC: 8 U/L — LOW (ref 10–45)
ANION GAP SERPL CALC-SCNC: 14 MMOL/L — SIGNIFICANT CHANGE UP (ref 5–17)
AST SERPL-CCNC: 19 U/L — SIGNIFICANT CHANGE UP (ref 10–40)
BILIRUB SERPL-MCNC: 0.5 MG/DL — SIGNIFICANT CHANGE UP (ref 0.2–1.2)
BUN SERPL-MCNC: 18 MG/DL — SIGNIFICANT CHANGE UP (ref 7–23)
CALCIUM SERPL-MCNC: 8.9 MG/DL — SIGNIFICANT CHANGE UP (ref 8.4–10.5)
CHLORIDE SERPL-SCNC: 100 MMOL/L — SIGNIFICANT CHANGE UP (ref 96–108)
CO2 SERPL-SCNC: 26 MMOL/L — SIGNIFICANT CHANGE UP (ref 22–31)
COVID-19 SPIKE DOMAIN AB INTERP: NEGATIVE — SIGNIFICANT CHANGE UP
COVID-19 SPIKE DOMAIN ANTIBODY RESULT: 0.4 U/ML — SIGNIFICANT CHANGE UP
CREAT SERPL-MCNC: 0.86 MG/DL — SIGNIFICANT CHANGE UP (ref 0.5–1.3)
D DIMER BLD IA.RAPID-MCNC: 168 NG/ML DDU — SIGNIFICANT CHANGE UP
ESTIMATED AVERAGE GLUCOSE: 160 MG/DL — HIGH (ref 68–114)
FERRITIN SERPL-MCNC: 28 NG/ML — SIGNIFICANT CHANGE UP (ref 15–150)
GLUCOSE BLDC GLUCOMTR-MCNC: 187 MG/DL — HIGH (ref 70–99)
GLUCOSE BLDC GLUCOMTR-MCNC: 201 MG/DL — HIGH (ref 70–99)
GLUCOSE BLDC GLUCOMTR-MCNC: 265 MG/DL — HIGH (ref 70–99)
GLUCOSE BLDC GLUCOMTR-MCNC: 81 MG/DL — SIGNIFICANT CHANGE UP (ref 70–99)
GLUCOSE SERPL-MCNC: 67 MG/DL — LOW (ref 70–99)
HCT VFR BLD CALC: 31.4 % — LOW (ref 34.5–45)
HGB BLD-MCNC: 9.3 G/DL — LOW (ref 11.5–15.5)
INR BLD: 1.98 RATIO — HIGH (ref 0.88–1.16)
MCHC RBC-ENTMCNC: 24.9 PG — LOW (ref 27–34)
MCHC RBC-ENTMCNC: 29.6 GM/DL — LOW (ref 32–36)
MCV RBC AUTO: 84.2 FL — SIGNIFICANT CHANGE UP (ref 80–100)
NRBC # BLD: 0 /100 WBCS — SIGNIFICANT CHANGE UP (ref 0–0)
PLATELET # BLD AUTO: 193 K/UL — SIGNIFICANT CHANGE UP (ref 150–400)
POTASSIUM SERPL-MCNC: 4 MMOL/L — SIGNIFICANT CHANGE UP (ref 3.5–5.3)
POTASSIUM SERPL-SCNC: 4 MMOL/L — SIGNIFICANT CHANGE UP (ref 3.5–5.3)
PROT SERPL-MCNC: 6.7 G/DL — SIGNIFICANT CHANGE UP (ref 6–8.3)
PROTHROM AB SERPL-ACNC: 23 SEC — HIGH (ref 10.6–13.6)
RBC # BLD: 3.73 M/UL — LOW (ref 3.8–5.2)
RBC # FLD: 16 % — HIGH (ref 10.3–14.5)
SARS-COV-2 IGG+IGM SERPL QL IA: 0.4 U/ML — SIGNIFICANT CHANGE UP
SARS-COV-2 IGG+IGM SERPL QL IA: NEGATIVE — SIGNIFICANT CHANGE UP
SODIUM SERPL-SCNC: 140 MMOL/L — SIGNIFICANT CHANGE UP (ref 135–145)
WBC # BLD: 4.48 K/UL — SIGNIFICANT CHANGE UP (ref 3.8–10.5)
WBC # FLD AUTO: 4.48 K/UL — SIGNIFICANT CHANGE UP (ref 3.8–10.5)

## 2021-04-03 RX ADMIN — Medication 50 MILLIGRAM(S): at 05:08

## 2021-04-03 RX ADMIN — Medication 1 DROP(S): at 14:08

## 2021-04-03 RX ADMIN — APIXABAN 5 MILLIGRAM(S): 2.5 TABLET, FILM COATED ORAL at 05:08

## 2021-04-03 RX ADMIN — REMDESIVIR 500 MILLIGRAM(S): 5 INJECTION INTRAVENOUS at 17:17

## 2021-04-03 RX ADMIN — APIXABAN 5 MILLIGRAM(S): 2.5 TABLET, FILM COATED ORAL at 17:16

## 2021-04-03 RX ADMIN — Medication 1 DROP(S): at 21:35

## 2021-04-03 RX ADMIN — ATORVASTATIN CALCIUM 10 MILLIGRAM(S): 80 TABLET, FILM COATED ORAL at 21:15

## 2021-04-03 RX ADMIN — Medication 1 GRAM(S): at 17:16

## 2021-04-03 RX ADMIN — Medication 1: at 12:32

## 2021-04-03 RX ADMIN — Medication 2: at 17:16

## 2021-04-03 RX ADMIN — Medication 1 DROP(S): at 05:08

## 2021-04-03 RX ADMIN — Medication 650 MILLIGRAM(S): at 00:14

## 2021-04-03 RX ADMIN — Medication 1 GRAM(S): at 05:08

## 2021-04-03 RX ADMIN — GABAPENTIN 100 MILLIGRAM(S): 400 CAPSULE ORAL at 21:35

## 2021-04-03 RX ADMIN — PANTOPRAZOLE SODIUM 40 MILLIGRAM(S): 20 TABLET, DELAYED RELEASE ORAL at 05:08

## 2021-04-03 NOTE — PHYSICAL THERAPY INITIAL EVALUATION ADULT - PLANNED THERAPY INTERVENTIONS, PT EVAL
Stair training... GOAL: In 2 weeks pt will negotiate 3 steps independently with least restrictive device./balance training/bed mobility training/gait training/strengthening/transfer training

## 2021-04-03 NOTE — PHYSICAL THERAPY INITIAL EVALUATION ADULT - ADDITIONAL COMMENTS
Pt poor historian.    As per CM Charmaine's note (spoke with nicristina Hernández [phone]): Pt lives in private home with 3 steps to enter with home health aide 6h/7d and niece and sister-in-law assist patient as needed. Pt amb independently with RW. Pt has w/c , shower chair, single axis cane, and raised toilet seat.    Family requesting pt D/C home.

## 2021-04-03 NOTE — PHYSICAL THERAPY INITIAL EVALUATION ADULT - IMPAIRMENTS FOUND, PT EVAL
aerobic capacity/endurance/arousal, attention, and cognition/gait, locomotion, and balance/poor safety awareness

## 2021-04-03 NOTE — PHYSICAL THERAPY INITIAL EVALUATION ADULT - LEVEL OF INDEPENDENCE: SIT/STAND, REHAB EVAL
minimum assist (75% patients effort) Post-Care Instructions: I reviewed with the patient in detail post-care instructions. Patient is not to engage in any heavy lifting, exercise, or swimming for the next 14 days. Should the patient develop any fevers, chills, bleeding, severe pain patient will contact the office immediately.

## 2021-04-03 NOTE — PHYSICAL THERAPY INITIAL EVALUATION ADULT - PERTINENT HX OF CURRENT PROBLEM, REHAB EVAL
89 y F COVID-19(+) on 4/1, presented to ED breathing room air but has definite crackles at the left base. PMH: CAD, DM, HLD, GERD, Afib on Eliquis, CHF (pt reports being on "water pills").

## 2021-04-03 NOTE — PROGRESS NOTE ADULT - SUBJECTIVE AND OBJECTIVE BOX
Patient is a 89y old  Female who presents with a chief complaint of covid (2021 19:58)      SUBJECTIVE / OVERNIGHT EVENTS:    MEDICATIONS  (STANDING):  apixaban 5 milliGRAM(s) Oral two times a day  artificial  tears Solution 1 Drop(s) Both EYES three times a day  atorvastatin 10 milliGRAM(s) Oral at bedtime  dextrose 40% Gel 15 Gram(s) Oral once  dextrose 5%. 1000 milliLiter(s) (50 mL/Hr) IV Continuous <Continuous>  dextrose 5%. 1000 milliLiter(s) (100 mL/Hr) IV Continuous <Continuous>  dextrose 50% Injectable 25 Gram(s) IV Push once  dextrose 50% Injectable 12.5 Gram(s) IV Push once  dextrose 50% Injectable 25 Gram(s) IV Push once  gabapentin 100 milliGRAM(s) Oral at bedtime  glucagon  Injectable 1 milliGRAM(s) IntraMuscular once  insulin lispro (ADMELOG) corrective regimen sliding scale   SubCutaneous three times a day before meals  metoprolol succinate ER 50 milliGRAM(s) Oral daily  pantoprazole    Tablet 40 milliGRAM(s) Oral before breakfast  remdesivir  IVPB   IV Intermittent   remdesivir  IVPB 100 milliGRAM(s) IV Intermittent every 24 hours  sodium chloride 0.45%. 1000 milliLiter(s) (50 mL/Hr) IV Continuous <Continuous>  sucralfate 1 Gram(s) Oral two times a day    MEDICATIONS  (PRN):  acetaminophen    Suspension .. 650 milliGRAM(s) Oral every 6 hours PRN Temp greater or equal to 38C (100.4F), Mild Pain (1 - 3)      Vital Signs Last 24 Hrs  T(F): 97.8 (21 @ 20:00), Max: 98.2 (21 @ 12:41)  HR: 82 (21 @ 20:00) (65 - 82)  BP: 120/76 (21 @ 20:00) (120/76 - 141/75)  RR: 18 (21 @ 20:00) (18 - 18)  SpO2: 95% (21 @ 20:00) (92% - 97%)  Telemetry:   CAPILLARY BLOOD GLUCOSE      POCT Blood Glucose.: 265 mg/dL (2021 21:01)  POCT Blood Glucose.: 201 mg/dL (2021 17:12)  POCT Blood Glucose.: 187 mg/dL (2021 12:22)  POCT Blood Glucose.: 81 mg/dL (2021 07:52)    I&O's Summary    2021 07:01  -  2021 22:24  --------------------------------------------------------  IN: 1025 mL / OUT: 850 mL / NET: 175 mL        PHYSICAL EXAM:  GENERAL: NAD, well-developed  HEAD:  Atraumatic, Normocephalic  EYES: EOMI, PERRLA, conjunctiva and sclera clear  NECK: Supple, No JVD  CHEST/LUNG: Clear to auscultation bilaterally; No wheeze  HEART: Regular rate and rhythm; No murmurs, rubs, or gallops  ABDOMEN: Soft, Nontender, Nondistended; Bowel sounds present  EXTREMITIES:  2+ Peripheral Pulses, No clubbing, cyanosis, or edema  PSYCH: AAOx3  NEUROLOGY: non-focal  SKIN: No rashes or lesions    LABS:                        9.3    4.48  )-----------( 193      ( 2021 06:17 )             31.4     04-03    140  |  100  |  18  ----------------------------<  67<L>  4.0   |  26  |  0.86    Ca    8.9      2021 08:00  Mg     2.0     04-02    TPro  6.7  /  Alb  3.5  /  TBili  0.5  /  DBili  x   /  AST  19  /  ALT  8<L>  /  AlkPhos  69  04-03    PT/INR - ( 2021 07:42 )   PT: 23.0 sec;   INR: 1.98 ratio         PTT - ( 2021 14:01 )  PTT:40.7 sec      Urinalysis Basic - ( 2021 14:28 )    Color: Yellow / Appearance: Clear / S.012 / pH: x  Gluc: x / Ketone: Negative  / Bili: Negative / Urobili: Negative   Blood: x / Protein: Negative / Nitrite: Negative   Leuk Esterase: Negative / RBC: 1 /hpf / WBC 1 /HPF   Sq Epi: x / Non Sq Epi: 1 / Bacteria: Negative        RADIOLOGY & ADDITIONAL TESTS:    Imaging Personally Reviewed:    Consultant(s) Notes Reviewed:      Care Discussed with Consultants/Other Providers:   Patient is a 89y old  Female who presents with a chief complaint of covid (2021 19:58)      SUBJECTIVE / OVERNIGHT EVENTS: comfortable    MEDICATIONS  (STANDING):  apixaban 5 milliGRAM(s) Oral two times a day  artificial  tears Solution 1 Drop(s) Both EYES three times a day  atorvastatin 10 milliGRAM(s) Oral at bedtime  dextrose 40% Gel 15 Gram(s) Oral once  dextrose 5%. 1000 milliLiter(s) (50 mL/Hr) IV Continuous <Continuous>  dextrose 5%. 1000 milliLiter(s) (100 mL/Hr) IV Continuous <Continuous>  dextrose 50% Injectable 25 Gram(s) IV Push once  dextrose 50% Injectable 12.5 Gram(s) IV Push once  dextrose 50% Injectable 25 Gram(s) IV Push once  gabapentin 100 milliGRAM(s) Oral at bedtime  glucagon  Injectable 1 milliGRAM(s) IntraMuscular once  insulin lispro (ADMELOG) corrective regimen sliding scale   SubCutaneous three times a day before meals  metoprolol succinate ER 50 milliGRAM(s) Oral daily  pantoprazole    Tablet 40 milliGRAM(s) Oral before breakfast  remdesivir  IVPB   IV Intermittent   remdesivir  IVPB 100 milliGRAM(s) IV Intermittent every 24 hours  sodium chloride 0.45%. 1000 milliLiter(s) (50 mL/Hr) IV Continuous <Continuous>  sucralfate 1 Gram(s) Oral two times a day    MEDICATIONS  (PRN):  acetaminophen    Suspension .. 650 milliGRAM(s) Oral every 6 hours PRN Temp greater or equal to 38C (100.4F), Mild Pain (1 - 3)      Vital Signs Last 24 Hrs  T(F): 97.8 (21 @ 20:00), Max: 98.2 (21 @ 12:41)  HR: 82 (21 @ 20:00) (65 - 82)  BP: 120/76 (21 @ 20:00) (120/76 - 141/75)  RR: 18 (21 @ 20:00) (18 - 18)  SpO2: 95% (21 @ 20:00) (92% - 97%)  Telemetry:   CAPILLARY BLOOD GLUCOSE      POCT Blood Glucose.: 265 mg/dL (2021 21:01)  POCT Blood Glucose.: 201 mg/dL (2021 17:12)  POCT Blood Glucose.: 187 mg/dL (2021 12:22)  POCT Blood Glucose.: 81 mg/dL (2021 07:52)    I&O's Summary    2021 07:01  -  2021 22:24  --------------------------------------------------------  IN: 1025 mL / OUT: 850 mL / NET: 175 mL        PHYSICAL EXAM:  GENERAL: NAD, well-developed  HEAD:  Atraumatic, Normocephalic  EYES: EOMI, PERRLA, conjunctiva and sclera clear  NECK: Supple, No JVD  CHEST/LUNG: Clear to auscultation bilaterally; No wheeze  HEART: Regular rate and rhythm; No murmurs, rubs, or gallops  ABDOMEN: Soft, Nontender, Nondistended; Bowel sounds present  EXTREMITIES:  2+ Peripheral Pulses, No clubbing, cyanosis, or edema  PSYCH: AAOx3  NEUROLOGY: non-focal  SKIN: No rashes or lesions    LABS:                        9.3    4.48  )-----------( 193      ( 2021 06:17 )             31.4     04-03    140  |  100  |  18  ----------------------------<  67<L>  4.0   |  26  |  0.86    Ca    8.9      2021 08:00  Mg     2.0     04-02    TPro  6.7  /  Alb  3.5  /  TBili  0.5  /  DBili  x   /  AST  19  /  ALT  8<L>  /  AlkPhos  69  04-03    PT/INR - ( 2021 07:42 )   PT: 23.0 sec;   INR: 1.98 ratio         PTT - ( 2021 14:01 )  PTT:40.7 sec      Urinalysis Basic - ( 2021 14:28 )    Color: Yellow / Appearance: Clear / S.012 / pH: x  Gluc: x / Ketone: Negative  / Bili: Negative / Urobili: Negative   Blood: x / Protein: Negative / Nitrite: Negative   Leuk Esterase: Negative / RBC: 1 /hpf / WBC 1 /HPF   Sq Epi: x / Non Sq Epi: 1 / Bacteria: Negative        RADIOLOGY & ADDITIONAL TESTS:    Imaging Personally Reviewed:    Consultant(s) Notes Reviewed:      Care Discussed with Consultants/Other Providers:

## 2021-04-03 NOTE — PHYSICAL THERAPY INITIAL EVALUATION ADULT - GAIT DEVIATIONS NOTED, PT EVAL
shuffling gait patten/decreased celeste/increased time in double stance/decreased velocity of limb motion/decreased step length/decreased stride length/decreased weight-shifting ability

## 2021-04-04 DIAGNOSIS — I48.91 UNSPECIFIED ATRIAL FIBRILLATION: ICD-10-CM

## 2021-04-04 DIAGNOSIS — E11.9 TYPE 2 DIABETES MELLITUS WITHOUT COMPLICATIONS: ICD-10-CM

## 2021-04-04 DIAGNOSIS — I25.10 ATHEROSCLEROTIC HEART DISEASE OF NATIVE CORONARY ARTERY WITHOUT ANGINA PECTORIS: ICD-10-CM

## 2021-04-04 DIAGNOSIS — J18.9 PNEUMONIA, UNSPECIFIED ORGANISM: ICD-10-CM

## 2021-04-04 LAB
ALBUMIN SERPL ELPH-MCNC: 3.1 G/DL — LOW (ref 3.3–5)
ALP SERPL-CCNC: 63 U/L — SIGNIFICANT CHANGE UP (ref 40–120)
ALT FLD-CCNC: 7 U/L — LOW (ref 10–45)
ANION GAP SERPL CALC-SCNC: 10 MMOL/L — SIGNIFICANT CHANGE UP (ref 5–17)
AST SERPL-CCNC: 19 U/L — SIGNIFICANT CHANGE UP (ref 10–40)
BILIRUB DIRECT SERPL-MCNC: 0.1 MG/DL — SIGNIFICANT CHANGE UP (ref 0–0.2)
BILIRUB SERPL-MCNC: 0.4 MG/DL — SIGNIFICANT CHANGE UP (ref 0.2–1.2)
BUN SERPL-MCNC: 15 MG/DL — SIGNIFICANT CHANGE UP (ref 7–23)
CALCIUM SERPL-MCNC: 8.4 MG/DL — SIGNIFICANT CHANGE UP (ref 8.4–10.5)
CHLORIDE SERPL-SCNC: 104 MMOL/L — SIGNIFICANT CHANGE UP (ref 96–108)
CO2 SERPL-SCNC: 24 MMOL/L — SIGNIFICANT CHANGE UP (ref 22–31)
CREAT SERPL-MCNC: 0.73 MG/DL — SIGNIFICANT CHANGE UP (ref 0.5–1.3)
CULTURE RESULTS: SIGNIFICANT CHANGE UP
GLUCOSE BLDC GLUCOMTR-MCNC: 163 MG/DL — HIGH (ref 70–99)
GLUCOSE BLDC GLUCOMTR-MCNC: 265 MG/DL — HIGH (ref 70–99)
GLUCOSE BLDC GLUCOMTR-MCNC: 284 MG/DL — HIGH (ref 70–99)
GLUCOSE BLDC GLUCOMTR-MCNC: 97 MG/DL — SIGNIFICANT CHANGE UP (ref 70–99)
GLUCOSE SERPL-MCNC: 93 MG/DL — SIGNIFICANT CHANGE UP (ref 70–99)
HCT VFR BLD CALC: 29.3 % — LOW (ref 34.5–45)
HGB BLD-MCNC: 8.7 G/DL — LOW (ref 11.5–15.5)
INR BLD: 2.07 RATIO — HIGH (ref 0.88–1.16)
MCHC RBC-ENTMCNC: 24.6 PG — LOW (ref 27–34)
MCHC RBC-ENTMCNC: 29.7 GM/DL — LOW (ref 32–36)
MCV RBC AUTO: 83 FL — SIGNIFICANT CHANGE UP (ref 80–100)
NRBC # BLD: 0 /100 WBCS — SIGNIFICANT CHANGE UP (ref 0–0)
PLATELET # BLD AUTO: 177 K/UL — SIGNIFICANT CHANGE UP (ref 150–400)
POTASSIUM SERPL-MCNC: 4 MMOL/L — SIGNIFICANT CHANGE UP (ref 3.5–5.3)
POTASSIUM SERPL-SCNC: 4 MMOL/L — SIGNIFICANT CHANGE UP (ref 3.5–5.3)
PROT SERPL-MCNC: 5.9 G/DL — LOW (ref 6–8.3)
PROTHROM AB SERPL-ACNC: 24 SEC — HIGH (ref 10.6–13.6)
RBC # BLD: 3.53 M/UL — LOW (ref 3.8–5.2)
RBC # FLD: 15.9 % — HIGH (ref 10.3–14.5)
SODIUM SERPL-SCNC: 138 MMOL/L — SIGNIFICANT CHANGE UP (ref 135–145)
SPECIMEN SOURCE: SIGNIFICANT CHANGE UP
WBC # BLD: 4.05 K/UL — SIGNIFICANT CHANGE UP (ref 3.8–10.5)
WBC # FLD AUTO: 4.05 K/UL — SIGNIFICANT CHANGE UP (ref 3.8–10.5)

## 2021-04-04 RX ADMIN — Medication 1 DROP(S): at 06:55

## 2021-04-04 RX ADMIN — APIXABAN 5 MILLIGRAM(S): 2.5 TABLET, FILM COATED ORAL at 04:28

## 2021-04-04 RX ADMIN — GABAPENTIN 100 MILLIGRAM(S): 400 CAPSULE ORAL at 21:13

## 2021-04-04 RX ADMIN — Medication 3: at 17:11

## 2021-04-04 RX ADMIN — Medication 1 GRAM(S): at 04:28

## 2021-04-04 RX ADMIN — REMDESIVIR 500 MILLIGRAM(S): 5 INJECTION INTRAVENOUS at 17:10

## 2021-04-04 RX ADMIN — PANTOPRAZOLE SODIUM 40 MILLIGRAM(S): 20 TABLET, DELAYED RELEASE ORAL at 04:28

## 2021-04-04 RX ADMIN — Medication 50 MILLIGRAM(S): at 04:28

## 2021-04-04 RX ADMIN — ATORVASTATIN CALCIUM 10 MILLIGRAM(S): 80 TABLET, FILM COATED ORAL at 21:13

## 2021-04-04 RX ADMIN — Medication 1 DROP(S): at 21:32

## 2021-04-04 RX ADMIN — Medication 1 GRAM(S): at 17:10

## 2021-04-04 RX ADMIN — APIXABAN 5 MILLIGRAM(S): 2.5 TABLET, FILM COATED ORAL at 17:10

## 2021-04-04 RX ADMIN — Medication 1: at 12:31

## 2021-04-04 RX ADMIN — Medication 1 DROP(S): at 13:18

## 2021-04-04 NOTE — OCCUPATIONAL THERAPY INITIAL EVALUATION ADULT - PERTINENT HX OF CURRENT PROBLEM, REHAB EVAL
89 y/o F PMHx of CAD, DM, HLD, GERD, Afib on Eliquis, ?CHF (pt reports being on "water pills"), COVID-19 (+) yesterday presents to ED BIBEMS for complaint of "they told me I had to go to the hospital". Pt reports she experienced nausea, cough, and SOB "a couple days ago" but reports feeling improved currently. Pt also reports poor appetite and poor PO intake, but states she has experienced this for a "long time" before.

## 2021-04-04 NOTE — CONSULT NOTE ADULT - SUBJECTIVE AND OBJECTIVE BOX
ID CONSULTATION-- Aime La [phone]    CC: Patient is a 89y old  Female who presents with a chief complaint of sever sore throat, dry cough, loss of appetite     89 y/o F PMHx of CAD, DM, HLD, GERD, Afib on Eliquis, ?CHF (pt reports being on "water pills"), COVID-19 (+) yesterday presents to ED BIBEMS for complaint of "they told me I had to go to the hospital". Pt reports she experienced nausea, cough, and SOB "a couple days ago" but reports feeling improved currently. Pt also reports poor appetite and poor PO intake, but states she has experienced this for a "long time" before. Pt denies abd pain, vomiting, current nausea, diarrhea, melena, hematochezia, dysuria, CP, current SOB, nasal congestion, sore throat, HA, f/c. Pt states she did not take Tylenol this morning.   HPI:  Home health aide recently diagnosed with COVID+, patient is home bound lives alone, illness started shortly after HHA became ill    Slightly confused, hard of hearing,       PAST MEDICAL & SURGICAL HISTORY:  Diabetes    Atrial fibrillation    Stented coronary artery    Coronary artery disease    No significant past surgical history        SOCIAL:  lives alone  nieces live nearby and check in on her    FAMILY HISTORY:  No pertinent family history in first degree relatives      REVIEW OF SYSTEMS    General: complains of sore throat, loss of appetite, chills, no fevers but no thermometer at home  breathing room air  knows name, place '[facility]', not date    Allergic/Immunologic:	No hives or rash   Allergies    No Known Allergies    Intolerances        ANTIMICROBIALS:    remdesivir  IVPB   IV Intermittent   remdesivir  IVPB 200 milliGRAM(s) IV Intermittent every 24 hours      Vital Signs Last 24 Hrs  T(C): 36.8 (02 Apr 2021 13:12), Max: 36.8 (02 Apr 2021 13:12)  T(F): 98.3 (02 Apr 2021 13:12), Max: 98.3 (02 Apr 2021 13:12)  HR: 96 (02 Apr 2021 14:08) (96 - 105)  BP: 135/77 (02 Apr 2021 14:08) (135/77 - 142/83)  BP(mean): --  RR: 19 (02 Apr 2021 14:08) (19 - 20)  SpO2: 99% (02 Apr 2021 14:08) (96% - 99%)    PHYSICAL EXAM:Pleasant patient in no acute distress brething room air      Constitutional:Comfortable.Awake and alert  No cachexia     Eyes:PERRL EOMI.NO discharge or conjunctival injection    ENMT:No sinus tenderness.No thrush.No pharyngeal exudate or erythema.Fair dental hygiene missing teeth upper and lower  no thrush no exudates    Neck:Supple,No LN,no JVD      Respiratory:Good air entry in general but with crackles at left base around to left midclavicular area 1/4 way up  right base is clear    Cardiovascular:S1 S2 wnl, No murmurs,rub or gallops, occasionally irregular    Gastrointestinal:Soft BS(+) no tenderness no masses ,No rebound or guarding, protuberant    Genitourinary:No CVA tendereness     Rectal:    Extremities:No cyanosis,clubbing or edema.    Vascular:peripheral pulses felt    Neurological:AAO X 2,No grossly focal deficits    Skin:No rash     Lymph Nodes:No palpable LNs shotty submandibular    Musculoskeletal:No joint swelling or LOM    Psychiatric:Affect normal.                              9.6    5.98  )-----------( 207      ( 02 Apr 2021 14:01 )             32.7       04-02    138  |  101  |  20  ----------------------------<  175<H>  4.8   |  24  |  0.88    Ca    8.9      02 Apr 2021 15:01  Mg     2.0     04-02    TPro  7.3  /  Alb  3.8  /  TBili  0.5  /  DBili  x   /  AST  72<H>  /  ALT  14  /  AlkPhos  70  04-02      RECENT CULTURES:  Urinalysis + Microscopic Examination (04.02.21 @ 14:28)    Urine Appearance: Clear    Urobilinogen: Negative    Specific Gravity: 1.012    Protein, Urine: Negative    pH Urine: 6.5    Leukocyte Esterase Concentration: Negative    Nitrite: Negative    Ketone - Urine: Negative    Bilirubin: Negative    Color: Yellow    Glucose Qualitative, Urine: Negative    Blood, Urine: Negative    Red Blood Cell - Urine: 1 /hpf    White Blood Cell - Urine: 1 /HPF    Epithelial Cells: 1    Hyaline Casts: 0 /LPF    Bacteria: Negative    Comment - Urine: Test Performed on Clinitek and Refractometer        RADIOLOGY:    CXR pending  IMPRESSION:    Rx:  
CHIEF COMPLAINT:    HISTORY OF PRESENT ILLNESS:  89 y/o F PMHx of CAD, DM, HLD, GERD, Afib on Eliquis, ?CHF (pt reports being on "water pills"), COVID-19 (+) yesterday presents to ED BIBEMS for complaint of "they told me I had to go to the hospital". Pt reports she experienced nausea, cough, and SOB "a couple days ago" but reports feeling improved currently. Pt also reports poor appetite and poor PO intake, but states she has experienced this for a "long time" before. Pt denies abd pain, vomiting, current nausea, diarrhea, melena, hematochezia, dysuria, CP, current SOB, nasal congestion, sore throat, HA, f/c. Pt states she did not take Tylenol this morning. ptn lives alone and needs help w all ADLs due to acute COVID.    PAST MEDICAL & SURGICAL HISTORY:  Diabetes    Atrial fibrillation    Stented coronary artery    Coronary artery disease    No significant past surgical history            MEDICATIONS:  apixaban 5 milliGRAM(s) Oral two times a day  metoprolol succinate ER 50 milliGRAM(s) Oral daily    remdesivir  IVPB   IV Intermittent   remdesivir  IVPB 100 milliGRAM(s) IV Intermittent every 24 hours      acetaminophen    Suspension .. 650 milliGRAM(s) Oral every 6 hours PRN  gabapentin 100 milliGRAM(s) Oral at bedtime    pantoprazole    Tablet 40 milliGRAM(s) Oral before breakfast  sucralfate 1 Gram(s) Oral two times a day    atorvastatin 10 milliGRAM(s) Oral at bedtime  dextrose 40% Gel 15 Gram(s) Oral once  dextrose 50% Injectable 25 Gram(s) IV Push once  dextrose 50% Injectable 12.5 Gram(s) IV Push once  dextrose 50% Injectable 25 Gram(s) IV Push once  glucagon  Injectable 1 milliGRAM(s) IntraMuscular once  insulin lispro (ADMELOG) corrective regimen sliding scale   SubCutaneous three times a day before meals    artificial  tears Solution 1 Drop(s) Both EYES three times a day  dextrose 5%. 1000 milliLiter(s) IV Continuous <Continuous>  dextrose 5%. 1000 milliLiter(s) IV Continuous <Continuous>  sodium chloride 0.45%. 1000 milliLiter(s) IV Continuous <Continuous>      FAMILY HISTORY:  No pertinent family history in first degree relatives        SOCIAL HISTORY:    [ ] Non-smoker  [ ] Smoker  [ ] Alcohol    Allergies    No Known Allergies    Intolerances    	    REVIEW OF SYSTEMS:  CONSTITUTIONAL: No fever, weight loss, or fatigue  EYES: No eye pain, visual disturbances, or discharge  ENMT:  No difficulty hearing, tinnitus, vertigo; No sinus or throat pain  NECK: No pain or stiffness  RESPIRATORY: No cough, wheezing, chills or hemoptysis; + Shortness of Breath  CARDIOVASCULAR: No chest pain, palpitations, passing out, dizziness, or leg swelling  GASTROINTESTINAL: No abdominal or epigastric pain. No nausea, vomiting, or hematemesis; No diarrhea or constipation. No melena or hematochezia.  GENITOURINARY: No dysuria, frequency, hematuria, or incontinence  NEUROLOGICAL: No headaches, memory loss, loss of strength, numbness, or tremors  SKIN: No itching, burning, rashes, or lesions   LYMPH Nodes: No enlarged glands  ENDOCRINE: No heat or cold intolerance; No hair loss  MUSCULOSKELETAL: No joint pain or swelling; No muscle, back, or extremity pain  PSYCHIATRIC: No depression, anxiety, mood swings, or difficulty sleeping  HEME/LYMPH: No easy bruising, or bleeding gums  ALLERY AND IMMUNOLOGIC: No hives or eczema	    [ ] All others negative	  [ ] Unable to obtain    PHYSICAL EXAM:  T(C): 36.5 (04-04-21 @ 20:03), Max: 37 (04-04-21 @ 04:10)  HR: 78 (04-04-21 @ 20:03) (64 - 78)  BP: 115/67 (04-04-21 @ 20:03) (112/68 - 133/69)  RR: 18 (04-04-21 @ 20:03) (18 - 18)  SpO2: 95% (04-04-21 @ 20:03) (95% - 96%)  Wt(kg): --  I&O's Summary    03 Apr 2021 07:01  -  04 Apr 2021 07:00  --------------------------------------------------------  IN: 1025 mL / OUT: 850 mL / NET: 175 mL    04 Apr 2021 07:01  -  04 Apr 2021 22:10  --------------------------------------------------------  IN: 825 mL / OUT: 1250 mL / NET: -425 mL        Appearance: NAD	  HEENT:   Dry  oral mucosa, PERRL, EOMI	  Lymphatic: No lymphadenopathy  Cardiovascular: Irregular S1 S2, No JVD, No murmurs, No edema  Respiratory: Decreased bs   Psychiatry: A & O x 3, Mood & affect appropriate  Gastrointestinal:  Soft, Non-tender, + BS	  Skin: No rashes, No ecchymoses, No cyanosis	  Neurologic: Non-focal  Extremities: Normal range of motion, No clubbing, cyanosis or edema  Vascular: Peripheral pulses palpable 2+ bilaterally    TELEMETRY: 	  AF  ECG:  	Afib RVR non specific stt changes    RADIOLOGY:  < from: Xray Chest 1 View- PORTABLE-Urgent (Xray Chest 1 View- PORTABLE-Urgent .) (04.02.21 @ 14:29) >    EXAM:  XR CHEST PORTABLE URGENT 1V                            PROCEDURE DATE:  04/02/2021            INTERPRETATION:  EXAMINATION: XR CHEST URGENT    CLINICAL INDICATION: Cough concern for pneumonia    TECHNIQUE: Single portable view of the chest was obtained.    COMPARISON: Chest x-ray 11/19/2019.    FINDINGS:    The cardiomediastinal silhouette is not well evaluated in this single AP projection.    Right lower lobe airspace opacity likely combination of atelectasis and/or pneumonia.    The left lung is clear.    No large pleural effusions or pneumothoraces.    No acute osseous abnormalities.    IMPRESSION:  Right lower lobe hazy airspace opacity likely combination of atelectasis and/or pneumonia.              ADA MACK MD; ResidentRadiology  This document has been electronically signed.  AMIRAH PHAN MD; Attending Interventional Radiologist  This document has been electronically signed. Apr  3 2021 12:26PM    < end of copied text >    OTHER: 	  	  LABS:	 	    CARDIAC MARKERS:    COVID-19 [employer] Interp: Negative: This test has been authorized for emergency use by the FDA.                               8.7    4.05  )-----------( 177      ( 04 Apr 2021 05:39 )             29.3     04-04    138  |  104  |  15  ----------------------------<  93  4.0   |  24  |  0.73    Ca    8.4      04 Apr 2021 05:39    TPro  5.9<L>  /  Alb  3.1<L>  /  TBili  0.4  /  DBili  0.1  /  AST  19  /  ALT  7<L>  /  AlkPhos  63  04-04    proBNP:   Lipid Profile:   HgA1c:   TSH:

## 2021-04-04 NOTE — CONSULT NOTE ADULT - ASSESSMENT
89 y/o F PMHx of CAD, DM, HLD, GERD, Afib on Eliquis, ?CHF (pt reports being on "water pills"), COVID-19 (+) yesterday presents to ED BIBEMS.  She is afebrile and breathing room air but has definite crackles at the left base and multiple risk factors for progression of her COVID pneumonia    Would:  Admit to medicine    COVID pneumonia:  follow up on CXR- consider chest CT scan if CXR is non revealing  send blood cultures x2 sets  send inflammatory markers for COVID- LDH, ferritin, CRP, procalcitonin, D-dimers  supplemental oxygen as needed  begin remdesivir protocol  monitor creatinine, monitor LFTs  can hold off on dexamethasone unless markers elevated    Diabetes Mellitus:  monitor glucose  baseline meds- sliding scale    CHF component:  would check TTE for EF    Discussed with ED staff    Aime La MD  [phone]  After 5pm/weekends [phone]      
  89 y/o F PMHx of CAD, DM, HLD, GERD, Afib on Eliquis, ?CHF (pt reports being on "water pills"), COVID-19 (+) admitted with PNA

## 2021-04-04 NOTE — OCCUPATIONAL THERAPY INITIAL EVALUATION ADULT - DIAGNOSIS, OT EVAL
Pt presents with decreased strength, endurance, and balance impacting ability to perform ADLs and functional mobility.

## 2021-04-04 NOTE — PROGRESS NOTE ADULT - SUBJECTIVE AND OBJECTIVE BOX
Patient is a 89y old  Female who presents with a chief complaint of covid (03 Apr 2021 22:24)      SUBJECTIVE / OVERNIGHT EVENTS: no new c/o    MEDICATIONS  (STANDING):  apixaban 5 milliGRAM(s) Oral two times a day  artificial  tears Solution 1 Drop(s) Both EYES three times a day  atorvastatin 10 milliGRAM(s) Oral at bedtime  dextrose 40% Gel 15 Gram(s) Oral once  dextrose 5%. 1000 milliLiter(s) (50 mL/Hr) IV Continuous <Continuous>  dextrose 5%. 1000 milliLiter(s) (100 mL/Hr) IV Continuous <Continuous>  dextrose 50% Injectable 25 Gram(s) IV Push once  dextrose 50% Injectable 12.5 Gram(s) IV Push once  dextrose 50% Injectable 25 Gram(s) IV Push once  gabapentin 100 milliGRAM(s) Oral at bedtime  glucagon  Injectable 1 milliGRAM(s) IntraMuscular once  insulin lispro (ADMELOG) corrective regimen sliding scale   SubCutaneous three times a day before meals  metoprolol succinate ER 50 milliGRAM(s) Oral daily  pantoprazole    Tablet 40 milliGRAM(s) Oral before breakfast  remdesivir  IVPB   IV Intermittent   remdesivir  IVPB 100 milliGRAM(s) IV Intermittent every 24 hours  sodium chloride 0.45%. 1000 milliLiter(s) (50 mL/Hr) IV Continuous <Continuous>  sucralfate 1 Gram(s) Oral two times a day    MEDICATIONS  (PRN):  acetaminophen    Suspension .. 650 milliGRAM(s) Oral every 6 hours PRN Temp greater or equal to 38C (100.4F), Mild Pain (1 - 3)      Vital Signs Last 24 Hrs  T(F): 98 (04-04-21 @ 11:28), Max: 98.6 (04-04-21 @ 04:10)  HR: 74 (04-04-21 @ 11:28) (64 - 82)  BP: 112/68 (04-04-21 @ 11:28) (112/68 - 133/69)  RR: 18 (04-04-21 @ 11:28) (18 - 18)  SpO2: 96% (04-04-21 @ 11:28) (95% - 96%)  Telemetry:   CAPILLARY BLOOD GLUCOSE      POCT Blood Glucose.: 265 mg/dL (04 Apr 2021 17:08)  POCT Blood Glucose.: 163 mg/dL (04 Apr 2021 12:27)  POCT Blood Glucose.: 97 mg/dL (04 Apr 2021 08:01)  POCT Blood Glucose.: 265 mg/dL (03 Apr 2021 21:01)    I&O's Summary    03 Apr 2021 07:01  -  04 Apr 2021 07:00  --------------------------------------------------------  IN: 1025 mL / OUT: 850 mL / NET: 175 mL    04 Apr 2021 07:01  -  04 Apr 2021 19:26  --------------------------------------------------------  IN: 825 mL / OUT: 600 mL / NET: 225 mL        PHYSICAL EXAM:  GENERAL: NAD, well-developed  HEAD:  Atraumatic, Normocephalic  EYES: EOMI, PERRLA, conjunctiva and sclera clear  NECK: Supple, No JVD  CHEST/LUNG: Clear to auscultation bilaterally; No wheeze  HEART: Regular rate and rhythm; No murmurs, rubs, or gallops  ABDOMEN: Soft, Nontender, Nondistended; Bowel sounds present  EXTREMITIES:  2+ Peripheral Pulses, No clubbing, cyanosis, or edema  PSYCH: AAOx3  NEUROLOGY: non-focal  SKIN: No rashes or lesions    LABS:                        8.7    4.05  )-----------( 177      ( 04 Apr 2021 05:39 )             29.3     04-04    138  |  104  |  15  ----------------------------<  93  4.0   |  24  |  0.73    Ca    8.4      04 Apr 2021 05:39    TPro  5.9<L>  /  Alb  3.1<L>  /  TBili  0.4  /  DBili  0.1  /  AST  19  /  ALT  7<L>  /  AlkPhos  63  04-04    PT/INR - ( 04 Apr 2021 05:39 )   PT: 24.0 sec;   INR: 2.07 ratio                   RADIOLOGY & ADDITIONAL TESTS:    Imaging Personally Reviewed:    Consultant(s) Notes Reviewed:      Care Discussed with Consultants/Other Providers:

## 2021-04-05 LAB
ALBUMIN SERPL ELPH-MCNC: 3.2 G/DL — LOW (ref 3.3–5)
ALBUMIN SERPL ELPH-MCNC: 3.2 G/DL — LOW (ref 3.3–5)
ALP SERPL-CCNC: 65 U/L — SIGNIFICANT CHANGE UP (ref 40–120)
ALP SERPL-CCNC: 65 U/L — SIGNIFICANT CHANGE UP (ref 40–120)
ALT FLD-CCNC: 8 U/L — LOW (ref 10–45)
ALT FLD-CCNC: 8 U/L — LOW (ref 10–45)
ANION GAP SERPL CALC-SCNC: 11 MMOL/L — SIGNIFICANT CHANGE UP (ref 5–17)
AST SERPL-CCNC: 21 U/L — SIGNIFICANT CHANGE UP (ref 10–40)
AST SERPL-CCNC: 21 U/L — SIGNIFICANT CHANGE UP (ref 10–40)
BILIRUB DIRECT SERPL-MCNC: 0.2 MG/DL — SIGNIFICANT CHANGE UP (ref 0–0.2)
BILIRUB INDIRECT FLD-MCNC: 0.2 MG/DL — SIGNIFICANT CHANGE UP (ref 0.2–1)
BILIRUB SERPL-MCNC: 0.4 MG/DL — SIGNIFICANT CHANGE UP (ref 0.2–1.2)
BILIRUB SERPL-MCNC: 0.4 MG/DL — SIGNIFICANT CHANGE UP (ref 0.2–1.2)
BUN SERPL-MCNC: 18 MG/DL — SIGNIFICANT CHANGE UP (ref 7–23)
CALCIUM SERPL-MCNC: 8.5 MG/DL — SIGNIFICANT CHANGE UP (ref 8.4–10.5)
CHLORIDE SERPL-SCNC: 103 MMOL/L — SIGNIFICANT CHANGE UP (ref 96–108)
CO2 SERPL-SCNC: 25 MMOL/L — SIGNIFICANT CHANGE UP (ref 22–31)
CREAT SERPL-MCNC: 0.65 MG/DL — SIGNIFICANT CHANGE UP (ref 0.5–1.3)
CREAT SERPL-MCNC: 0.68 MG/DL — SIGNIFICANT CHANGE UP (ref 0.5–1.3)
GLUCOSE BLDC GLUCOMTR-MCNC: 211 MG/DL — HIGH (ref 70–99)
GLUCOSE BLDC GLUCOMTR-MCNC: 228 MG/DL — HIGH (ref 70–99)
GLUCOSE BLDC GLUCOMTR-MCNC: 251 MG/DL — HIGH (ref 70–99)
GLUCOSE SERPL-MCNC: 150 MG/DL — HIGH (ref 70–99)
HCT VFR BLD CALC: 31.2 % — LOW (ref 34.5–45)
HGB BLD-MCNC: 9.3 G/DL — LOW (ref 11.5–15.5)
INR BLD: 2.27 RATIO — HIGH (ref 0.88–1.16)
MCHC RBC-ENTMCNC: 24.9 PG — LOW (ref 27–34)
MCHC RBC-ENTMCNC: 29.8 GM/DL — LOW (ref 32–36)
MCV RBC AUTO: 83.6 FL — SIGNIFICANT CHANGE UP (ref 80–100)
NRBC # BLD: 0 /100 WBCS — SIGNIFICANT CHANGE UP (ref 0–0)
PLATELET # BLD AUTO: 189 K/UL — SIGNIFICANT CHANGE UP (ref 150–400)
POTASSIUM SERPL-MCNC: 3.9 MMOL/L — SIGNIFICANT CHANGE UP (ref 3.5–5.3)
POTASSIUM SERPL-SCNC: 3.9 MMOL/L — SIGNIFICANT CHANGE UP (ref 3.5–5.3)
PROT SERPL-MCNC: 6.1 G/DL — SIGNIFICANT CHANGE UP (ref 6–8.3)
PROT SERPL-MCNC: 6.1 G/DL — SIGNIFICANT CHANGE UP (ref 6–8.3)
PROTHROM AB SERPL-ACNC: 26.2 SEC — HIGH (ref 10.6–13.6)
RBC # BLD: 3.73 M/UL — LOW (ref 3.8–5.2)
RBC # FLD: 16.1 % — HIGH (ref 10.3–14.5)
SODIUM SERPL-SCNC: 139 MMOL/L — SIGNIFICANT CHANGE UP (ref 135–145)
WBC # BLD: 3.99 K/UL — SIGNIFICANT CHANGE UP (ref 3.8–10.5)
WBC # FLD AUTO: 3.99 K/UL — SIGNIFICANT CHANGE UP (ref 3.8–10.5)

## 2021-04-05 PROCEDURE — 93306 TTE W/DOPPLER COMPLETE: CPT | Mod: 26

## 2021-04-05 PROCEDURE — 99232 SBSQ HOSP IP/OBS MODERATE 35: CPT | Mod: CS

## 2021-04-05 RX ORDER — INSULIN LISPRO 100/ML
VIAL (ML) SUBCUTANEOUS AT BEDTIME
Refills: 0 | Status: DISCONTINUED | OUTPATIENT
Start: 2021-04-05 | End: 2021-04-07

## 2021-04-05 RX ADMIN — Medication 50 MILLIGRAM(S): at 05:02

## 2021-04-05 RX ADMIN — Medication 1 DROP(S): at 21:09

## 2021-04-05 RX ADMIN — Medication 1 DROP(S): at 14:03

## 2021-04-05 RX ADMIN — Medication 1 GRAM(S): at 05:02

## 2021-04-05 RX ADMIN — REMDESIVIR 500 MILLIGRAM(S): 5 INJECTION INTRAVENOUS at 18:03

## 2021-04-05 RX ADMIN — Medication 1: at 21:24

## 2021-04-05 RX ADMIN — GABAPENTIN 100 MILLIGRAM(S): 400 CAPSULE ORAL at 21:09

## 2021-04-05 RX ADMIN — Medication 2: at 12:39

## 2021-04-05 RX ADMIN — PANTOPRAZOLE SODIUM 40 MILLIGRAM(S): 20 TABLET, DELAYED RELEASE ORAL at 05:03

## 2021-04-05 RX ADMIN — APIXABAN 5 MILLIGRAM(S): 2.5 TABLET, FILM COATED ORAL at 05:02

## 2021-04-05 RX ADMIN — Medication 1 GRAM(S): at 17:15

## 2021-04-05 RX ADMIN — APIXABAN 5 MILLIGRAM(S): 2.5 TABLET, FILM COATED ORAL at 17:15

## 2021-04-05 RX ADMIN — Medication 2: at 17:14

## 2021-04-05 RX ADMIN — ATORVASTATIN CALCIUM 10 MILLIGRAM(S): 80 TABLET, FILM COATED ORAL at 21:09

## 2021-04-05 RX ADMIN — Medication 1 DROP(S): at 07:10

## 2021-04-05 NOTE — PROGRESS NOTE ADULT - SUBJECTIVE AND OBJECTIVE BOX
INFECTIOUS DISEASES FOLLOW UP-- [facility]  [phone]    This is a follow up note for this  89yFemale with  Infection due to severe acute respiratory syndrome coronavirus 2 (SARS-CoV-2)  competing remdesivir therapy        ROS:  CONSTITUTIONAL:  No fever, good appetite  CARDIOVASCULAR:  No chest pain or palpitations  RESPIRATORY:  No dyspnea  GASTROINTESTINAL:  No nausea, vomiting, diarrhea, or abdominal pain  GENITOURINARY:  No dysuria  NEUROLOGIC:  No headache,     Allergies    No Known Allergies    Intolerances        ANTIBIOTICS/RELEVANT:  antimicrobials  remdesivir  IVPB   IV Intermittent   remdesivir  IVPB 100 milliGRAM(s) IV Intermittent every 24 hours    immunologic:    OTHER:  acetaminophen    Suspension .. 650 milliGRAM(s) Oral every 6 hours PRN  apixaban 5 milliGRAM(s) Oral two times a day  artificial  tears Solution 1 Drop(s) Both EYES three times a day  atorvastatin 10 milliGRAM(s) Oral at bedtime  dextrose 40% Gel 15 Gram(s) Oral once  dextrose 5%. 1000 milliLiter(s) IV Continuous <Continuous>  dextrose 5%. 1000 milliLiter(s) IV Continuous <Continuous>  dextrose 50% Injectable 25 Gram(s) IV Push once  dextrose 50% Injectable 12.5 Gram(s) IV Push once  dextrose 50% Injectable 25 Gram(s) IV Push once  gabapentin 100 milliGRAM(s) Oral at bedtime  glucagon  Injectable 1 milliGRAM(s) IntraMuscular once  insulin lispro (ADMELOG) corrective regimen sliding scale   SubCutaneous three times a day before meals  insulin lispro (ADMELOG) corrective regimen sliding scale   SubCutaneous at bedtime  metoprolol succinate ER 50 milliGRAM(s) Oral daily  pantoprazole    Tablet 40 milliGRAM(s) Oral before breakfast  sodium chloride 0.45%. 1000 milliLiter(s) IV Continuous <Continuous>  sucralfate 1 Gram(s) Oral two times a day      Objective:  Vital Signs Last 24 Hrs  T(C): 36.7 (05 Apr 2021 20:24), Max: 36.9 (05 Apr 2021 12:52)  T(F): 98 (05 Apr 2021 20:24), Max: 98.4 (05 Apr 2021 12:52)  HR: 90 (05 Apr 2021 20:24) (73 - 90)  BP: 115/81 (05 Apr 2021 20:24) (114/66 - 127/71)  BP(mean): --  RR: 18 (05 Apr 2021 20:24) (18 - 18)  SpO2: 95% (05 Apr 2021 20:24) (94% - 96%)    PHYSICAL EXAM:  Constitutional:no acute distress  Eyes:LORNA, EOMI  Ear/Nose/Throat: no oral lesions, 	  Respiratory: clear BL  Cardiovascular: S1S2  Gastrointestinal:soft, (+) BS, no tenderness  Extremities:no e/e/c  No Lymphadenopathy  IV sites not inflammed.    LABS:                        9.3    3.99  )-----------( 189      ( 05 Apr 2021 06:33 )             31.2     04-05    139  |  103  |  18  ----------------------------<  150<H>  3.9   |  25  |  0.68    Ca    8.5      05 Apr 2021 06:33    TPro  6.1  /  Alb  3.2<L>  /  TBili  0.4  /  DBili  0.2  /  AST  21  /  ALT  8<L>  /  AlkPhos  65  04-05    PT/INR - ( 05 Apr 2021 06:33 )   PT: 26.2 sec;   INR: 2.27 ratio               MICROBIOLOGY:            RECENT CULTURES:  04-02 @ 17:06  .Urine Clean Catch (Midstream)  --  --  --    >=3 organisms. Probable collection contamination.  --      RADIOLOGY & ADDITIONAL STUDIES:    < from: Xray Chest 1 View- PORTABLE-Urgent (Xray Chest 1 View- PORTABLE-Urgent .) (04.02.21 @ 14:29) >  IMPRESSION:  Right lower lobe hazy airspace opacity likely combination of atelectasis and/or pneumonia.    < end of copied text >

## 2021-04-05 NOTE — PROGRESS NOTE ADULT - SUBJECTIVE AND OBJECTIVE BOX
Patient is a 89y old  Female who presents with a chief complaint of covid (05 Apr 2021 10:34)      SUBJECTIVE / OVERNIGHT EVENTS:    MEDICATIONS  (STANDING):  apixaban 5 milliGRAM(s) Oral two times a day  artificial  tears Solution 1 Drop(s) Both EYES three times a day  atorvastatin 10 milliGRAM(s) Oral at bedtime  dextrose 40% Gel 15 Gram(s) Oral once  dextrose 5%. 1000 milliLiter(s) (50 mL/Hr) IV Continuous <Continuous>  dextrose 5%. 1000 milliLiter(s) (100 mL/Hr) IV Continuous <Continuous>  dextrose 50% Injectable 25 Gram(s) IV Push once  dextrose 50% Injectable 12.5 Gram(s) IV Push once  dextrose 50% Injectable 25 Gram(s) IV Push once  gabapentin 100 milliGRAM(s) Oral at bedtime  glucagon  Injectable 1 milliGRAM(s) IntraMuscular once  insulin lispro (ADMELOG) corrective regimen sliding scale   SubCutaneous three times a day before meals  insulin lispro (ADMELOG) corrective regimen sliding scale   SubCutaneous at bedtime  metoprolol succinate ER 50 milliGRAM(s) Oral daily  pantoprazole    Tablet 40 milliGRAM(s) Oral before breakfast  remdesivir  IVPB   IV Intermittent   remdesivir  IVPB 100 milliGRAM(s) IV Intermittent every 24 hours  sodium chloride 0.45%. 1000 milliLiter(s) (50 mL/Hr) IV Continuous <Continuous>  sucralfate 1 Gram(s) Oral two times a day    MEDICATIONS  (PRN):  acetaminophen    Suspension .. 650 milliGRAM(s) Oral every 6 hours PRN Temp greater or equal to 38C (100.4F), Mild Pain (1 - 3)      Vital Signs Last 24 Hrs  T(F): 98 (04-05-21 @ 20:24), Max: 98.4 (04-05-21 @ 12:52)  HR: 90 (04-05-21 @ 20:24) (73 - 90)  BP: 115/81 (04-05-21 @ 20:24) (114/66 - 127/71)  RR: 18 (04-05-21 @ 20:24) (18 - 18)  SpO2: 95% (04-05-21 @ 20:24) (94% - 96%)  Telemetry:   CAPILLARY BLOOD GLUCOSE      POCT Blood Glucose.: 251 mg/dL (05 Apr 2021 21:19)  POCT Blood Glucose.: 228 mg/dL (05 Apr 2021 16:50)  POCT Blood Glucose.: 211 mg/dL (05 Apr 2021 12:33)  POCT Blood Glucose.: 149 mg/dL (05 Apr 2021 07:53)    I&O's Summary    04 Apr 2021 07:01  -  05 Apr 2021 07:00  --------------------------------------------------------  IN: 825 mL / OUT: 1250 mL / NET: -425 mL    05 Apr 2021 07:01  -  05 Apr 2021 22:33  --------------------------------------------------------  IN: 480 mL / OUT: 200 mL / NET: 280 mL        PHYSICAL EXAM:  GENERAL: NAD, well-developed  HEAD:  Atraumatic, Normocephalic  EYES: EOMI, PERRLA, conjunctiva and sclera clear  NECK: Supple, No JVD  CHEST/LUNG: Clear to auscultation bilaterally; No wheeze  HEART: Regular rate and rhythm; No murmurs, rubs, or gallops  ABDOMEN: Soft, Nontender, Nondistended; Bowel sounds present  EXTREMITIES:  2+ Peripheral Pulses, No clubbing, cyanosis, or edema  PSYCH: AAOx3  NEUROLOGY: non-focal  SKIN: No rashes or lesions    LABS:                        9.3    3.99  )-----------( 189      ( 05 Apr 2021 06:33 )             31.2     04-05    139  |  103  |  18  ----------------------------<  150<H>  3.9   |  25  |  0.68    Ca    8.5      05 Apr 2021 06:33    TPro  6.1  /  Alb  3.2<L>  /  TBili  0.4  /  DBili  0.2  /  AST  21  /  ALT  8<L>  /  AlkPhos  65  04-05    PT/INR - ( 05 Apr 2021 06:33 )   PT: 26.2 sec;   INR: 2.27 ratio                   RADIOLOGY & ADDITIONAL TESTS:    Imaging Personally Reviewed:    Consultant(s) Notes Reviewed:      Care Discussed with Consultants/Other Providers:   Patient is a 89y old  Female who presents with a chief complaint of covid (05 Apr 2021 10:34)      SUBJECTIVE / OVERNIGHT EVENTS: comfortable    MEDICATIONS  (STANDING):  apixaban 5 milliGRAM(s) Oral two times a day  artificial  tears Solution 1 Drop(s) Both EYES three times a day  atorvastatin 10 milliGRAM(s) Oral at bedtime  dextrose 40% Gel 15 Gram(s) Oral once  dextrose 5%. 1000 milliLiter(s) (50 mL/Hr) IV Continuous <Continuous>  dextrose 5%. 1000 milliLiter(s) (100 mL/Hr) IV Continuous <Continuous>  dextrose 50% Injectable 25 Gram(s) IV Push once  dextrose 50% Injectable 12.5 Gram(s) IV Push once  dextrose 50% Injectable 25 Gram(s) IV Push once  gabapentin 100 milliGRAM(s) Oral at bedtime  glucagon  Injectable 1 milliGRAM(s) IntraMuscular once  insulin lispro (ADMELOG) corrective regimen sliding scale   SubCutaneous three times a day before meals  insulin lispro (ADMELOG) corrective regimen sliding scale   SubCutaneous at bedtime  metoprolol succinate ER 50 milliGRAM(s) Oral daily  pantoprazole    Tablet 40 milliGRAM(s) Oral before breakfast  remdesivir  IVPB   IV Intermittent   remdesivir  IVPB 100 milliGRAM(s) IV Intermittent every 24 hours  sodium chloride 0.45%. 1000 milliLiter(s) (50 mL/Hr) IV Continuous <Continuous>  sucralfate 1 Gram(s) Oral two times a day    MEDICATIONS  (PRN):  acetaminophen    Suspension .. 650 milliGRAM(s) Oral every 6 hours PRN Temp greater or equal to 38C (100.4F), Mild Pain (1 - 3)      Vital Signs Last 24 Hrs  T(F): 98 (04-05-21 @ 20:24), Max: 98.4 (04-05-21 @ 12:52)  HR: 90 (04-05-21 @ 20:24) (73 - 90)  BP: 115/81 (04-05-21 @ 20:24) (114/66 - 127/71)  RR: 18 (04-05-21 @ 20:24) (18 - 18)  SpO2: 95% (04-05-21 @ 20:24) (94% - 96%)  Telemetry:   CAPILLARY BLOOD GLUCOSE      POCT Blood Glucose.: 251 mg/dL (05 Apr 2021 21:19)  POCT Blood Glucose.: 228 mg/dL (05 Apr 2021 16:50)  POCT Blood Glucose.: 211 mg/dL (05 Apr 2021 12:33)  POCT Blood Glucose.: 149 mg/dL (05 Apr 2021 07:53)    I&O's Summary    04 Apr 2021 07:01  -  05 Apr 2021 07:00  --------------------------------------------------------  IN: 825 mL / OUT: 1250 mL / NET: -425 mL    05 Apr 2021 07:01  -  05 Apr 2021 22:33  --------------------------------------------------------  IN: 480 mL / OUT: 200 mL / NET: 280 mL        PHYSICAL EXAM:  GENERAL: NAD, well-developed  HEAD:  Atraumatic, Normocephalic  EYES: EOMI, PERRLA, conjunctiva and sclera clear  NECK: Supple, No JVD  CHEST/LUNG: Clear to auscultation bilaterally; No wheeze  HEART: Regular rate and rhythm; No murmurs, rubs, or gallops  ABDOMEN: Soft, Nontender, Nondistended; Bowel sounds present  EXTREMITIES:  2+ Peripheral Pulses, No clubbing, cyanosis, or edema  PSYCH: AAOx3  NEUROLOGY: non-focal  SKIN: No rashes or lesions    LABS:                        9.3    3.99  )-----------( 189      ( 05 Apr 2021 06:33 )             31.2     04-05    139  |  103  |  18  ----------------------------<  150<H>  3.9   |  25  |  0.68    Ca    8.5      05 Apr 2021 06:33    TPro  6.1  /  Alb  3.2<L>  /  TBili  0.4  /  DBili  0.2  /  AST  21  /  ALT  8<L>  /  AlkPhos  65  04-05    PT/INR - ( 05 Apr 2021 06:33 )   PT: 26.2 sec;   INR: 2.27 ratio                   RADIOLOGY & ADDITIONAL TESTS:    Imaging Personally Reviewed:    Consultant(s) Notes Reviewed:      Care Discussed with Consultants/Other Providers:

## 2021-04-05 NOTE — PROGRESS NOTE ADULT - SUBJECTIVE AND OBJECTIVE BOX
Subjective: Patient seen and examined. No new events except as noted.   resting comfortably off oxygen   HR controlled   REVIEW OF SYSTEMS:    CONSTITUTIONAL: + weakness, fevers or chills  EYES/ENT: No visual changes;  No vertigo or throat pain   NECK: No pain or stiffness  RESPIRATORY: No cough, wheezing, hemoptysis; No shortness of breath  CARDIOVASCULAR: No chest pain or palpitations  GASTROINTESTINAL: No abdominal or epigastric pain. No nausea, vomiting, or hematemesis; No diarrhea or constipation. No melena or hematochezia.  GENITOURINARY: No dysuria, frequency or hematuria  NEUROLOGICAL: No numbness or weakness  SKIN: No itching, burning, rashes, or lesions   All other review of systems is negative unless indicated above.    MEDICATIONS:  MEDICATIONS  (STANDING):  apixaban 5 milliGRAM(s) Oral two times a day  artificial  tears Solution 1 Drop(s) Both EYES three times a day  atorvastatin 10 milliGRAM(s) Oral at bedtime  dextrose 40% Gel 15 Gram(s) Oral once  dextrose 5%. 1000 milliLiter(s) (50 mL/Hr) IV Continuous <Continuous>  dextrose 5%. 1000 milliLiter(s) (100 mL/Hr) IV Continuous <Continuous>  dextrose 50% Injectable 25 Gram(s) IV Push once  dextrose 50% Injectable 12.5 Gram(s) IV Push once  dextrose 50% Injectable 25 Gram(s) IV Push once  gabapentin 100 milliGRAM(s) Oral at bedtime  glucagon  Injectable 1 milliGRAM(s) IntraMuscular once  insulin lispro (ADMELOG) corrective regimen sliding scale   SubCutaneous three times a day before meals  metoprolol succinate ER 50 milliGRAM(s) Oral daily  pantoprazole    Tablet 40 milliGRAM(s) Oral before breakfast  remdesivir  IVPB   IV Intermittent   remdesivir  IVPB 100 milliGRAM(s) IV Intermittent every 24 hours  sodium chloride 0.45%. 1000 milliLiter(s) (50 mL/Hr) IV Continuous <Continuous>  sucralfate 1 Gram(s) Oral two times a day      PHYSICAL EXAM:  T(C): 36.8 (04-05-21 @ 04:25), Max: 36.8 (04-05-21 @ 04:25)  HR: 84 (04-05-21 @ 04:25) (74 - 84)  BP: 114/66 (04-05-21 @ 04:25) (112/68 - 115/67)  RR: 18 (04-05-21 @ 04:25) (18 - 18)  SpO2: 96% (04-05-21 @ 04:25) (95% - 96%)  Wt(kg): --  I&O's Summary    04 Apr 2021 07:01  -  05 Apr 2021 07:00  --------------------------------------------------------  IN: 825 mL / OUT: 1250 mL / NET: -425 mL          Appearance: NAD  HEENT:   Dry  oral mucosa, PERRL, EOMI	  Lymphatic: No lymphadenopathy , no edema  Cardiovascular: irregular  S1 S2, No JVD, No murmurs , Peripheral pulses palpable 2+ bilaterally  Respiratory: Decreased bs 	  Gastrointestinal:  Soft, Non-tender, + BS	  Skin: No rashes, No ecchymoses, No cyanosis, warm to touch  Musculoskeletal: Normal range of motion, normal strength  Psychiatry:  Mood & affect appropriate  Ext: No edema      LABS:    CARDIAC MARKERS:                                9.3    3.99  )-----------( 189      ( 05 Apr 2021 06:33 )             31.2     04-05    139  |  103  |  18  ----------------------------<  150<H>  3.9   |  25  |  0.68    Ca    8.5      05 Apr 2021 06:33    TPro  6.1  /  Alb  3.2<L>  /  TBili  0.4  /  DBili  0.2  /  AST  21  /  ALT  8<L>  /  AlkPhos  65  04-05    proBNP:   Lipid Profile:   HgA1c:   TSH:     0          TELEMETRY: 	  AF  ECG:  	  RADIOLOGY:   DIAGNOSTIC TESTING:  [ ] Echocardiogram:  [ ]  Catheterization:  [ ] Stress Test:    OTHER:

## 2021-04-06 ENCOUNTER — TRANSCRIPTION ENCOUNTER (OUTPATIENT)
Age: 86
End: 2021-04-06

## 2021-04-06 LAB
ALBUMIN SERPL ELPH-MCNC: 3.1 G/DL — LOW (ref 3.3–5)
ALP SERPL-CCNC: 63 U/L — SIGNIFICANT CHANGE UP (ref 40–120)
ALT FLD-CCNC: 6 U/L — LOW (ref 10–45)
ANION GAP SERPL CALC-SCNC: 10 MMOL/L — SIGNIFICANT CHANGE UP (ref 5–17)
AST SERPL-CCNC: 19 U/L — SIGNIFICANT CHANGE UP (ref 10–40)
BILIRUB DIRECT SERPL-MCNC: 0.1 MG/DL — SIGNIFICANT CHANGE UP (ref 0–0.2)
BILIRUB SERPL-MCNC: 0.5 MG/DL — SIGNIFICANT CHANGE UP (ref 0.2–1.2)
BUN SERPL-MCNC: 18 MG/DL — SIGNIFICANT CHANGE UP (ref 7–23)
CALCIUM SERPL-MCNC: 8.2 MG/DL — LOW (ref 8.4–10.5)
CHLORIDE SERPL-SCNC: 105 MMOL/L — SIGNIFICANT CHANGE UP (ref 96–108)
CO2 SERPL-SCNC: 23 MMOL/L — SIGNIFICANT CHANGE UP (ref 22–31)
CREAT SERPL-MCNC: 0.61 MG/DL — SIGNIFICANT CHANGE UP (ref 0.5–1.3)
GLUCOSE BLDC GLUCOMTR-MCNC: 175 MG/DL — HIGH (ref 70–99)
GLUCOSE BLDC GLUCOMTR-MCNC: 190 MG/DL — HIGH (ref 70–99)
GLUCOSE BLDC GLUCOMTR-MCNC: 236 MG/DL — HIGH (ref 70–99)
GLUCOSE BLDC GLUCOMTR-MCNC: 243 MG/DL — HIGH (ref 70–99)
GLUCOSE SERPL-MCNC: 145 MG/DL — HIGH (ref 70–99)
INR BLD: 2.57 RATIO — HIGH (ref 0.88–1.16)
POTASSIUM SERPL-MCNC: 3.9 MMOL/L — SIGNIFICANT CHANGE UP (ref 3.5–5.3)
POTASSIUM SERPL-SCNC: 3.9 MMOL/L — SIGNIFICANT CHANGE UP (ref 3.5–5.3)
PROT SERPL-MCNC: 5.7 G/DL — LOW (ref 6–8.3)
PROTHROM AB SERPL-ACNC: 29.5 SEC — HIGH (ref 10.6–13.6)
SARS-COV-2 RNA SPEC QL NAA+PROBE: DETECTED
SODIUM SERPL-SCNC: 138 MMOL/L — SIGNIFICANT CHANGE UP (ref 135–145)

## 2021-04-06 PROCEDURE — 99232 SBSQ HOSP IP/OBS MODERATE 35: CPT | Mod: CS

## 2021-04-06 RX ORDER — ASPIRIN/CALCIUM CARB/MAGNESIUM 324 MG
81 TABLET ORAL DAILY
Refills: 0 | Status: DISCONTINUED | OUTPATIENT
Start: 2021-04-06 | End: 2021-04-07

## 2021-04-06 RX ORDER — APIXABAN 2.5 MG/1
2.5 TABLET, FILM COATED ORAL
Refills: 0 | Status: DISCONTINUED | OUTPATIENT
Start: 2021-04-06 | End: 2021-04-07

## 2021-04-06 RX ORDER — BACITRACIN 500 [USP'U]/G
1 OINTMENT OPHTHALMIC THREE TIMES A DAY
Refills: 0 | Status: DISCONTINUED | OUTPATIENT
Start: 2021-04-06 | End: 2021-04-07

## 2021-04-06 RX ADMIN — Medication 1 GRAM(S): at 18:16

## 2021-04-06 RX ADMIN — Medication 1: at 08:18

## 2021-04-06 RX ADMIN — Medication 1 DROP(S): at 05:18

## 2021-04-06 RX ADMIN — Medication 1 GRAM(S): at 05:17

## 2021-04-06 RX ADMIN — Medication 81 MILLIGRAM(S): at 11:26

## 2021-04-06 RX ADMIN — Medication 1 DROP(S): at 14:10

## 2021-04-06 RX ADMIN — APIXABAN 2.5 MILLIGRAM(S): 2.5 TABLET, FILM COATED ORAL at 18:18

## 2021-04-06 RX ADMIN — ATORVASTATIN CALCIUM 10 MILLIGRAM(S): 80 TABLET, FILM COATED ORAL at 21:10

## 2021-04-06 RX ADMIN — REMDESIVIR 500 MILLIGRAM(S): 5 INJECTION INTRAVENOUS at 18:17

## 2021-04-06 RX ADMIN — Medication 50 MILLIGRAM(S): at 05:17

## 2021-04-06 RX ADMIN — Medication 1 DROP(S): at 21:10

## 2021-04-06 RX ADMIN — Medication 1: at 17:40

## 2021-04-06 RX ADMIN — PANTOPRAZOLE SODIUM 40 MILLIGRAM(S): 20 TABLET, DELAYED RELEASE ORAL at 08:19

## 2021-04-06 RX ADMIN — BACITRACIN 1 APPLICATION(S): 500 OINTMENT OPHTHALMIC at 16:04

## 2021-04-06 RX ADMIN — Medication 2: at 11:53

## 2021-04-06 RX ADMIN — APIXABAN 5 MILLIGRAM(S): 2.5 TABLET, FILM COATED ORAL at 05:18

## 2021-04-06 RX ADMIN — BACITRACIN 1 APPLICATION(S): 500 OINTMENT OPHTHALMIC at 21:11

## 2021-04-06 RX ADMIN — GABAPENTIN 100 MILLIGRAM(S): 400 CAPSULE ORAL at 21:10

## 2021-04-06 NOTE — PROGRESS NOTE ADULT - SUBJECTIVE AND OBJECTIVE BOX
Subjective: Patient seen and examined. No new events except as noted.   resting comfortably   no cp or sob      REVIEW OF SYSTEMS:    CONSTITUTIONAL: + weakness, fevers or chills  EYES/ENT: No visual changes;  No vertigo or throat pain   NECK: No pain or stiffness  RESPIRATORY: No cough, wheezing, hemoptysis; No shortness of breath  CARDIOVASCULAR: No chest pain or palpitations  GASTROINTESTINAL: No abdominal or epigastric pain. No nausea, vomiting, or hematemesis; No diarrhea or constipation. No melena or hematochezia.  GENITOURINARY: No dysuria, frequency or hematuria  NEUROLOGICAL: No numbness or weakness  SKIN: No itching, burning, rashes, or lesions   All other review of systems is negative unless indicated above.    MEDICATIONS:  MEDICATIONS  (STANDING):  apixaban 5 milliGRAM(s) Oral two times a day  artificial  tears Solution 1 Drop(s) Both EYES three times a day  atorvastatin 10 milliGRAM(s) Oral at bedtime  dextrose 40% Gel 15 Gram(s) Oral once  dextrose 5%. 1000 milliLiter(s) (50 mL/Hr) IV Continuous <Continuous>  dextrose 5%. 1000 milliLiter(s) (100 mL/Hr) IV Continuous <Continuous>  dextrose 50% Injectable 25 Gram(s) IV Push once  dextrose 50% Injectable 12.5 Gram(s) IV Push once  dextrose 50% Injectable 25 Gram(s) IV Push once  gabapentin 100 milliGRAM(s) Oral at bedtime  glucagon  Injectable 1 milliGRAM(s) IntraMuscular once  insulin lispro (ADMELOG) corrective regimen sliding scale   SubCutaneous three times a day before meals  insulin lispro (ADMELOG) corrective regimen sliding scale   SubCutaneous at bedtime  metoprolol succinate ER 50 milliGRAM(s) Oral daily  pantoprazole    Tablet 40 milliGRAM(s) Oral before breakfast  remdesivir  IVPB   IV Intermittent   remdesivir  IVPB 100 milliGRAM(s) IV Intermittent every 24 hours  sodium chloride 0.45%. 1000 milliLiter(s) (50 mL/Hr) IV Continuous <Continuous>  sucralfate 1 Gram(s) Oral two times a day      PHYSICAL EXAM:  T(C): 36.8 (04-06-21 @ 05:05), Max: 36.9 (04-05-21 @ 12:52)  HR: 83 (04-06-21 @ 05:05) (73 - 90)  BP: 136/79 (04-06-21 @ 05:05) (115/81 - 136/79)  RR: 18 (04-06-21 @ 05:05) (18 - 18)  SpO2: 99% (04-06-21 @ 05:05) (94% - 99%)  Wt(kg): --  I&O's Summary    05 Apr 2021 07:01  -  06 Apr 2021 07:00  --------------------------------------------------------  IN: 480 mL / OUT: 1350 mL / NET: -870 mL          Appearance: NAD	  HEENT:   Dry  oral mucosa, PERRL, EOMI	  Lymphatic: No lymphadenopathy , no edema  Cardiovascular: Irregular S1 S2, No JVD, No murmurs , Peripheral pulses palpable 2+ bilaterally  Respiratory: Decreased bs 	  Gastrointestinal:  Soft, Non-tender, + BS	  Skin: No rashes, No ecchymoses, No cyanosis, warm to touch  Musculoskeletal: Normal range of motion, normal strength  Psychiatry:  Mood & affect appropriate  Ext: No edema      LABS:    CARDIAC MARKERS:                                9.3    3.99  )-----------( 189      ( 05 Apr 2021 06:33 )             31.2     04-06    138  |  105  |  18  ----------------------------<  145<H>  3.9   |  23  |  0.61    Ca    8.2<L>      06 Apr 2021 07:27    TPro  5.7<L>  /  Alb  3.1<L>  /  TBili  0.5  /  DBili  0.1  /  AST  19  /  ALT  6<L>  /  AlkPhos  63  04-06    proBNP:   Lipid Profile:   HgA1c:   TSH:       D-Dimer Assay, Quantitative: 168 ng/mL DDU (04.03.21 @ 07:42)       TELEMETRY: 	AF    ECG:  	  RADIOLOGY:   DIAGNOSTIC TESTING:  [ ] Echocardiogram:  [ ]  Catheterization:  [ ] Stress Test:    OTHER:

## 2021-04-06 NOTE — DISCHARGE NOTE PROVIDER - NSDCMRMEDTOKEN_GEN_ALL_CORE_FT
Artificial Tears ophthalmic solution: 1 drop(s) to each affected eye , As Needed  atorvastatin 10 mg oral tablet: 1 tab(s) orally once a day  Eliquis 5 mg oral tablet: 1 tab(s) orally 2 times a day  furosemide 20 mg oral tablet: 1 tab(s) orally once a day  gabapentin 100 mg oral capsule: 1 cap(s) orally once a day (at bedtime)  glipiZIDE 10 mg oral tablet, extended release: 1 tab(s) orally once a day  Januvia 100 mg oral tablet: 1 tab(s) orally once a day  metFORMIN 850 mg oral tablet: 1 tab(s) orally in the morning  0.5 tab(s) orally in the evening  Metoprolol Succinate ER 50 mg oral tablet, extended release: 1 tab(s) orally once a day (in the evening)- rhythm control  pantoprazole 20 mg oral delayed release tablet: 1 tab(s) orally once a day  Pepto-Bismol: as needed  sucralfate 1 g oral tablet: 1 tab(s) orally 2 times a day  Tylenol: as needed   apixaban 2.5 mg oral tablet: 1 tab(s) orally 2 times a day  Artificial Tears ophthalmic solution: 1 drop(s) to each affected eye , As Needed  atorvastatin 10 mg oral tablet: 1 tab(s) orally once a day  bacitracin 500 units/g ophthalmic ointment: 1 application to each affected eye 3 times a day for 3 more days  furosemide 20 mg oral tablet: 1 tab(s) orally once a day  gabapentin 100 mg oral capsule: 1 cap(s) orally once a day (at bedtime)  glipiZIDE 10 mg oral tablet, extended release: 1 tab(s) orally once a day  Januvia 100 mg oral tablet: 1 tab(s) orally once a day  metFORMIN 850 mg oral tablet: 1 tab(s) orally in the morning  0.5 tab(s) orally in the evening  Metoprolol Succinate ER 50 mg oral tablet, extended release: 1 tab(s) orally once a day (in the evening)- rhythm control  pantoprazole 20 mg oral delayed release tablet: 1 tab(s) orally once a day  sucralfate 1 g oral tablet: 1 tab(s) orally 2 times a day  Tylenol: as needed

## 2021-04-06 NOTE — DISCHARGE NOTE PROVIDER - HOSPITAL COURSE
89 y/o F PMHx of CAD, DM, HLD, GERD, atrial thrombus , Afib on Eliquis, ?CHF (pt reports being on "water pills"), COVID-19 (+) yesterday presents to ED BIBEMS for complaint of "they told me I had to go to the hospital". Pt reports she experienced nausea, cough, and SOB "a couple days ago" but reports feeling improved currently. Pt also reports poor appetite and poor PO intake, but states she has experienced this for a "long time" before.     Dx:	Covid PNA - Remdesivir completed 4/6. Pt not hypoxic, no decadron given                   Anemia- stable                   Hypokalemia - repleted and resolved                   R eye inflammation /conjunctitvits on bacitracin for 3 days.                    Hx of Afib , seen by Dr. Beltran cardiology, eliquis decreased as per patient age and weight.     Course of remdesivir completed. Discharged home with [facility].

## 2021-04-06 NOTE — PROGRESS NOTE ADULT - SUBJECTIVE AND OBJECTIVE BOX
INFECTIOUS DISEASES FOLLOW UP-- Ai La  [phone]    This is a follow up note for this  89yFemale with  Infection due to severe acute respiratory syndrome coronavirus 2 (SARS-CoV-2)  stable on room air  complaining of right eye irritaiton        ROS:  CONSTITUTIONAL:  No fever, good appetite  CARDIOVASCULAR:  No chest pain or palpitations  RESPIRATORY:  No dyspnea  GASTROINTESTINAL:  No nausea, vomiting, diarrhea, or abdominal pain  GENITOURINARY:  No dysuria  NEUROLOGIC:  No headache,     Allergies    No Known Allergies    Intolerances        ANTIBIOTICS/RELEVANT:  antimicrobials    immunologic:    OTHER:  acetaminophen    Suspension .. 650 milliGRAM(s) Oral every 6 hours PRN  apixaban 2.5 milliGRAM(s) Oral two times a day  artificial  tears Solution 1 Drop(s) Both EYES three times a day  aspirin enteric coated 81 milliGRAM(s) Oral daily  atorvastatin 10 milliGRAM(s) Oral at bedtime  BACItracin   Ophthalmic Ointment 1 Application(s) Right EYE three times a day  dextrose 40% Gel 15 Gram(s) Oral once  dextrose 5%. 1000 milliLiter(s) IV Continuous <Continuous>  dextrose 5%. 1000 milliLiter(s) IV Continuous <Continuous>  dextrose 50% Injectable 25 Gram(s) IV Push once  dextrose 50% Injectable 12.5 Gram(s) IV Push once  dextrose 50% Injectable 25 Gram(s) IV Push once  gabapentin 100 milliGRAM(s) Oral at bedtime  glucagon  Injectable 1 milliGRAM(s) IntraMuscular once  insulin lispro (ADMELOG) corrective regimen sliding scale   SubCutaneous three times a day before meals  insulin lispro (ADMELOG) corrective regimen sliding scale   SubCutaneous at bedtime  metoprolol succinate ER 50 milliGRAM(s) Oral daily  pantoprazole    Tablet 40 milliGRAM(s) Oral before breakfast  sodium chloride 0.45%. 1000 milliLiter(s) IV Continuous <Continuous>  sucralfate 1 Gram(s) Oral two times a day      Objective:  Vital Signs Last 24 Hrs  T(C): 36.9 (06 Apr 2021 20:17), Max: 36.9 (06 Apr 2021 20:17)  T(F): 98.4 (06 Apr 2021 20:17), Max: 98.4 (06 Apr 2021 20:17)  HR: 80 (06 Apr 2021 20:17) (80 - 87)  BP: 148/77 (06 Apr 2021 20:17) (136/79 - 148/77)  BP(mean): --  RR: 18 (06 Apr 2021 20:17) (17 - 18)  SpO2: 97% (06 Apr 2021 20:17) (97% - 99%)    PHYSICAL EXAM:  Constitutional:no acute distress  Eyes:LORNA, EOMI, right eye with redness in conjunctiva  Ear/Nose/Throat: no oral lesions, 	  Respiratory: clear BL  Cardiovascular: S1S2  Gastrointestinal:soft, (+) BS, no tenderness  Extremities:no e/e/c  No Lymphadenopathy  IV sites not inflammed.    LABS:                        9.3    3.99  )-----------( 189      ( 05 Apr 2021 06:33 )             31.2     04-06    138  |  105  |  18  ----------------------------<  145<H>  3.9   |  23  |  0.61    Ca    8.2<L>      06 Apr 2021 07:27    TPro  5.7<L>  /  Alb  3.1<L>  /  TBili  0.5  /  DBili  0.1  /  AST  19  /  ALT  6<L>  /  AlkPhos  63  04-06    PT/INR - ( 06 Apr 2021 07:22 )   PT: 29.5 sec;   INR: 2.57 ratio               MICROBIOLOGY:            RECENT CULTURES:  04-02 @ 17:06  .Urine Clean Catch (Midstream)  --  --  --    >=3 organisms. Probable collection contamination.  --      RADIOLOGY & ADDITIONAL STUDIES:  < from: Xray Chest 1 View- PORTABLE-Urgent (Xray Chest 1 View- PORTABLE-Urgent .) (04.02.21 @ 14:29) >  IMPRESSION:  Right lower lobe hazy airspace opacity likely combination of atelectasis and/or pneumonia.    < end of copied text >

## 2021-04-06 NOTE — PROGRESS NOTE ADULT - SUBJECTIVE AND OBJECTIVE BOX
Patient is a 89y old  Female who presents with a chief complaint of covid (06 Apr 2021 21:50)      SUBJECTIVE / OVERNIGHT EVENTS:    MEDICATIONS  (STANDING):  apixaban 2.5 milliGRAM(s) Oral two times a day  artificial  tears Solution 1 Drop(s) Both EYES three times a day  aspirin enteric coated 81 milliGRAM(s) Oral daily  atorvastatin 10 milliGRAM(s) Oral at bedtime  BACItracin   Ophthalmic Ointment 1 Application(s) Right EYE three times a day  dextrose 40% Gel 15 Gram(s) Oral once  dextrose 5%. 1000 milliLiter(s) (50 mL/Hr) IV Continuous <Continuous>  dextrose 5%. 1000 milliLiter(s) (100 mL/Hr) IV Continuous <Continuous>  dextrose 50% Injectable 25 Gram(s) IV Push once  dextrose 50% Injectable 12.5 Gram(s) IV Push once  dextrose 50% Injectable 25 Gram(s) IV Push once  gabapentin 100 milliGRAM(s) Oral at bedtime  glucagon  Injectable 1 milliGRAM(s) IntraMuscular once  insulin lispro (ADMELOG) corrective regimen sliding scale   SubCutaneous three times a day before meals  insulin lispro (ADMELOG) corrective regimen sliding scale   SubCutaneous at bedtime  metoprolol succinate ER 50 milliGRAM(s) Oral daily  pantoprazole    Tablet 40 milliGRAM(s) Oral before breakfast  sodium chloride 0.45%. 1000 milliLiter(s) (50 mL/Hr) IV Continuous <Continuous>  sucralfate 1 Gram(s) Oral two times a day    MEDICATIONS  (PRN):  acetaminophen    Suspension .. 650 milliGRAM(s) Oral every 6 hours PRN Temp greater or equal to 38C (100.4F), Mild Pain (1 - 3)      Vital Signs Last 24 Hrs  T(F): 98.4 (04-06-21 @ 20:17), Max: 98.4 (04-06-21 @ 20:17)  HR: 80 (04-06-21 @ 20:17) (80 - 87)  BP: 148/77 (04-06-21 @ 20:17) (136/79 - 148/77)  RR: 18 (04-06-21 @ 20:17) (17 - 18)  SpO2: 97% (04-06-21 @ 20:17) (97% - 99%)  Telemetry:   CAPILLARY BLOOD GLUCOSE      POCT Blood Glucose.: 243 mg/dL (06 Apr 2021 21:07)  POCT Blood Glucose.: 190 mg/dL (06 Apr 2021 16:50)  POCT Blood Glucose.: 236 mg/dL (06 Apr 2021 11:49)  POCT Blood Glucose.: 175 mg/dL (06 Apr 2021 08:07)    I&O's Summary    05 Apr 2021 07:01  -  06 Apr 2021 07:00  --------------------------------------------------------  IN: 480 mL / OUT: 1350 mL / NET: -870 mL    06 Apr 2021 07:01  -  06 Apr 2021 23:01  --------------------------------------------------------  IN: 240 mL / OUT: 700 mL / NET: -460 mL        PHYSICAL EXAM:  GENERAL: NAD, well-developed  HEAD:  Atraumatic, Normocephalic  EYES: EOMI, PERRLA, conjunctiva and sclera clear  NECK: Supple, No JVD  CHEST/LUNG: Clear to auscultation bilaterally; No wheeze  HEART: Regular rate and rhythm; No murmurs, rubs, or gallops  ABDOMEN: Soft, Nontender, Nondistended; Bowel sounds present  EXTREMITIES:  2+ Peripheral Pulses, No clubbing, cyanosis, or edema  PSYCH: AAOx3  NEUROLOGY: non-focal  SKIN: No rashes or lesions    LABS:                        9.3    3.99  )-----------( 189      ( 05 Apr 2021 06:33 )             31.2     04-06    138  |  105  |  18  ----------------------------<  145<H>  3.9   |  23  |  0.61    Ca    8.2<L>      06 Apr 2021 07:27    TPro  5.7<L>  /  Alb  3.1<L>  /  TBili  0.5  /  DBili  0.1  /  AST  19  /  ALT  6<L>  /  AlkPhos  63  04-06    PT/INR - ( 06 Apr 2021 07:22 )   PT: 29.5 sec;   INR: 2.57 ratio                   RADIOLOGY & ADDITIONAL TESTS:    Imaging Personally Reviewed:    Consultant(s) Notes Reviewed:      Care Discussed with Consultants/Other Providers:   Patient is a 89y old  Female who presents with a chief complaint of covid (06 Apr 2021 21:50)      SUBJECTIVE / OVERNIGHT EVENTS: comfortable    MEDICATIONS  (STANDING):  apixaban 2.5 milliGRAM(s) Oral two times a day  artificial  tears Solution 1 Drop(s) Both EYES three times a day  aspirin enteric coated 81 milliGRAM(s) Oral daily  atorvastatin 10 milliGRAM(s) Oral at bedtime  BACItracin   Ophthalmic Ointment 1 Application(s) Right EYE three times a day  dextrose 40% Gel 15 Gram(s) Oral once  dextrose 5%. 1000 milliLiter(s) (50 mL/Hr) IV Continuous <Continuous>  dextrose 5%. 1000 milliLiter(s) (100 mL/Hr) IV Continuous <Continuous>  dextrose 50% Injectable 25 Gram(s) IV Push once  dextrose 50% Injectable 12.5 Gram(s) IV Push once  dextrose 50% Injectable 25 Gram(s) IV Push once  gabapentin 100 milliGRAM(s) Oral at bedtime  glucagon  Injectable 1 milliGRAM(s) IntraMuscular once  insulin lispro (ADMELOG) corrective regimen sliding scale   SubCutaneous three times a day before meals  insulin lispro (ADMELOG) corrective regimen sliding scale   SubCutaneous at bedtime  metoprolol succinate ER 50 milliGRAM(s) Oral daily  pantoprazole    Tablet 40 milliGRAM(s) Oral before breakfast  sodium chloride 0.45%. 1000 milliLiter(s) (50 mL/Hr) IV Continuous <Continuous>  sucralfate 1 Gram(s) Oral two times a day    MEDICATIONS  (PRN):  acetaminophen    Suspension .. 650 milliGRAM(s) Oral every 6 hours PRN Temp greater or equal to 38C (100.4F), Mild Pain (1 - 3)      Vital Signs Last 24 Hrs  T(F): 98.4 (04-06-21 @ 20:17), Max: 98.4 (04-06-21 @ 20:17)  HR: 80 (04-06-21 @ 20:17) (80 - 87)  BP: 148/77 (04-06-21 @ 20:17) (136/79 - 148/77)  RR: 18 (04-06-21 @ 20:17) (17 - 18)  SpO2: 97% (04-06-21 @ 20:17) (97% - 99%)  Telemetry:   CAPILLARY BLOOD GLUCOSE      POCT Blood Glucose.: 243 mg/dL (06 Apr 2021 21:07)  POCT Blood Glucose.: 190 mg/dL (06 Apr 2021 16:50)  POCT Blood Glucose.: 236 mg/dL (06 Apr 2021 11:49)  POCT Blood Glucose.: 175 mg/dL (06 Apr 2021 08:07)    I&O's Summary    05 Apr 2021 07:01  -  06 Apr 2021 07:00  --------------------------------------------------------  IN: 480 mL / OUT: 1350 mL / NET: -870 mL    06 Apr 2021 07:01  -  06 Apr 2021 23:01  --------------------------------------------------------  IN: 240 mL / OUT: 700 mL / NET: -460 mL        PHYSICAL EXAM:  GENERAL: NAD, well-developed  HEAD:  Atraumatic, Normocephalic  EYES: EOMI, PERRLA, conjunctiva and sclera clear  NECK: Supple, No JVD  CHEST/LUNG: Clear to auscultation bilaterally; No wheeze  HEART: Regular rate and rhythm; No murmurs, rubs, or gallops  ABDOMEN: Soft, Nontender, Nondistended; Bowel sounds present  EXTREMITIES:  2+ Peripheral Pulses, No clubbing, cyanosis, or edema  PSYCH: AAOx3  NEUROLOGY: non-focal  SKIN: No rashes or lesions    LABS:                        9.3    3.99  )-----------( 189      ( 05 Apr 2021 06:33 )             31.2     04-06    138  |  105  |  18  ----------------------------<  145<H>  3.9   |  23  |  0.61    Ca    8.2<L>      06 Apr 2021 07:27    TPro  5.7<L>  /  Alb  3.1<L>  /  TBili  0.5  /  DBili  0.1  /  AST  19  /  ALT  6<L>  /  AlkPhos  63  04-06    PT/INR - ( 06 Apr 2021 07:22 )   PT: 29.5 sec;   INR: 2.57 ratio                   RADIOLOGY & ADDITIONAL TESTS:    Imaging Personally Reviewed:    Consultant(s) Notes Reviewed:      Care Discussed with Consultants/Other Providers:

## 2021-04-06 NOTE — DISCHARGE NOTE PROVIDER - NSDCCPCAREPLAN_GEN_ALL_CORE_FT
PRINCIPAL DISCHARGE DIAGNOSIS  Diagnosis: COVID-19  Assessment and Plan of Treatment: Complete abx as recommended  Quarantine yourself for 14 days   Please follow up with your PCP in 1-2 weeks -Call your Provider before hand to make then aware of your hospitalization   Take Tylenol for Fevers every 6 hours as needed- Do not exceed 4gm of Tylenol in a 24 hour period  Stay hydrated   WEAR A FACE MASK   Cover your cough and sneezes   Clean your hands often   Avoid sharing personal house hold items   Clean all high touch surfaces- everyday items like table tops , door knobs, cell phones etc   You should restrict activities outside your home except for getting medical care   Avoid using public transportation  Do not go to work, school, or Public areas   Monitor your oxygen saturation   Call [facility] at [phone]        SECONDARY DISCHARGE DIAGNOSES  Diagnosis: Atrial fibrillation with RVR  Assessment and Plan of Treatment: Atrial fibrillation is the most common heart rhythm problem.  The condition puts you at risk for has stroke and heart attack  It helps if you control your blood pressure, not drink more than 1-2 alcohol drinks per day, cut down on caffeine, getting treatment for over active thyroid gland, and get regular exercise  Call your doctor if you feel your heart racing or beating unusually, chest tightness or pain, lightheaded, faint, shortness of breath especially with exercise  It is important to take your heart medication as prescribed  You may be on anticoagulation which is very important to take as directed - you may need blood work to monitor drug levels      Diagnosis: Anemia  Assessment and Plan of Treatment: follow up with PMD for managment    Diagnosis: Hypokalemia  Assessment and Plan of Treatment: Follow up wi PMD for management and repeat lab work

## 2021-04-06 NOTE — DISCHARGE NOTE PROVIDER - CARE PROVIDER_API CALL
Heber Beltran (DO)  Cardiology; Internal Medicine  [address] [place] [address], [address]  [place] [address]  Phone: [phone]  Fax: [phone]  Follow Up Time:

## 2021-04-07 ENCOUNTER — TRANSCRIPTION ENCOUNTER (OUTPATIENT)
Age: 86
End: 2021-04-07

## 2021-04-07 VITALS
TEMPERATURE: 98 F | RESPIRATION RATE: 18 BRPM | OXYGEN SATURATION: 98 % | DIASTOLIC BLOOD PRESSURE: 70 MMHG | SYSTOLIC BLOOD PRESSURE: 149 MMHG | HEART RATE: 84 BPM

## 2021-04-07 LAB
ALBUMIN SERPL ELPH-MCNC: 3.1 G/DL — LOW (ref 3.3–5)
ALBUMIN SERPL ELPH-MCNC: 3.1 G/DL — LOW (ref 3.3–5)
ALP SERPL-CCNC: 68 U/L — SIGNIFICANT CHANGE UP (ref 40–120)
ALP SERPL-CCNC: 69 U/L — SIGNIFICANT CHANGE UP (ref 40–120)
ALT FLD-CCNC: 7 U/L — LOW (ref 10–45)
ALT FLD-CCNC: 7 U/L — LOW (ref 10–45)
ANION GAP SERPL CALC-SCNC: 11 MMOL/L — SIGNIFICANT CHANGE UP (ref 5–17)
AST SERPL-CCNC: 17 U/L — SIGNIFICANT CHANGE UP (ref 10–40)
AST SERPL-CCNC: 17 U/L — SIGNIFICANT CHANGE UP (ref 10–40)
BILIRUB DIRECT SERPL-MCNC: 0.2 MG/DL — SIGNIFICANT CHANGE UP (ref 0–0.2)
BILIRUB INDIRECT FLD-MCNC: 0.3 MG/DL — SIGNIFICANT CHANGE UP (ref 0.2–1)
BILIRUB SERPL-MCNC: 0.5 MG/DL — SIGNIFICANT CHANGE UP (ref 0.2–1.2)
BILIRUB SERPL-MCNC: 0.5 MG/DL — SIGNIFICANT CHANGE UP (ref 0.2–1.2)
BUN SERPL-MCNC: 18 MG/DL — SIGNIFICANT CHANGE UP (ref 7–23)
CALCIUM SERPL-MCNC: 8.5 MG/DL — SIGNIFICANT CHANGE UP (ref 8.4–10.5)
CHLORIDE SERPL-SCNC: 105 MMOL/L — SIGNIFICANT CHANGE UP (ref 96–108)
CO2 SERPL-SCNC: 23 MMOL/L — SIGNIFICANT CHANGE UP (ref 22–31)
CREAT SERPL-MCNC: 0.6 MG/DL — SIGNIFICANT CHANGE UP (ref 0.5–1.3)
CREAT SERPL-MCNC: 0.62 MG/DL — SIGNIFICANT CHANGE UP (ref 0.5–1.3)
GLUCOSE BLDC GLUCOMTR-MCNC: 163 MG/DL — HIGH (ref 70–99)
GLUCOSE BLDC GLUCOMTR-MCNC: 310 MG/DL — HIGH (ref 70–99)
GLUCOSE SERPL-MCNC: 160 MG/DL — HIGH (ref 70–99)
HCT VFR BLD CALC: 33 % — LOW (ref 34.5–45)
HGB BLD-MCNC: 9.6 G/DL — LOW (ref 11.5–15.5)
INR BLD: 2.19 RATIO — HIGH (ref 0.88–1.16)
MCHC RBC-ENTMCNC: 24.4 PG — LOW (ref 27–34)
MCHC RBC-ENTMCNC: 29.1 GM/DL — LOW (ref 32–36)
MCV RBC AUTO: 83.8 FL — SIGNIFICANT CHANGE UP (ref 80–100)
NRBC # BLD: 0 /100 WBCS — SIGNIFICANT CHANGE UP (ref 0–0)
PLATELET # BLD AUTO: 203 K/UL — SIGNIFICANT CHANGE UP (ref 150–400)
POTASSIUM SERPL-MCNC: 3.9 MMOL/L — SIGNIFICANT CHANGE UP (ref 3.5–5.3)
POTASSIUM SERPL-SCNC: 3.9 MMOL/L — SIGNIFICANT CHANGE UP (ref 3.5–5.3)
PROT SERPL-MCNC: 6.1 G/DL — SIGNIFICANT CHANGE UP (ref 6–8.3)
PROT SERPL-MCNC: 6.1 G/DL — SIGNIFICANT CHANGE UP (ref 6–8.3)
PROTHROM AB SERPL-ACNC: 25.3 SEC — HIGH (ref 10.6–13.6)
RBC # BLD: 3.94 M/UL — SIGNIFICANT CHANGE UP (ref 3.8–5.2)
RBC # FLD: 15.8 % — HIGH (ref 10.3–14.5)
SODIUM SERPL-SCNC: 139 MMOL/L — SIGNIFICANT CHANGE UP (ref 135–145)
WBC # BLD: 5.35 K/UL — SIGNIFICANT CHANGE UP (ref 3.8–10.5)
WBC # FLD AUTO: 5.35 K/UL — SIGNIFICANT CHANGE UP (ref 3.8–10.5)

## 2021-04-07 RX ORDER — ASPIRIN/CALCIUM CARB/MAGNESIUM 324 MG
1 TABLET ORAL
Qty: 30 | Refills: 0
Start: 2021-04-07 | End: 2021-05-06

## 2021-04-07 RX ORDER — METOPROLOL TARTRATE 50 MG
1 TABLET ORAL
Qty: 30 | Refills: 0
Start: 2021-04-07 | End: 2021-05-06

## 2021-04-07 RX ORDER — APIXABAN 2.5 MG/1
1 TABLET, FILM COATED ORAL
Qty: 60 | Refills: 0
Start: 2021-04-07 | End: 2021-05-06

## 2021-04-07 RX ORDER — BACITRACIN 500 [USP'U]/G
1 OINTMENT OPHTHALMIC
Qty: 0 | Refills: 0 | DISCHARGE
Start: 2021-04-07

## 2021-04-07 RX ORDER — APIXABAN 2.5 MG/1
1 TABLET, FILM COATED ORAL
Qty: 0 | Refills: 0 | DISCHARGE

## 2021-04-07 RX ORDER — GABAPENTIN 400 MG/1
1 CAPSULE ORAL
Qty: 30 | Refills: 0
Start: 2021-04-07 | End: 2021-05-06

## 2021-04-07 RX ADMIN — PANTOPRAZOLE SODIUM 40 MILLIGRAM(S): 20 TABLET, DELAYED RELEASE ORAL at 05:06

## 2021-04-07 RX ADMIN — BACITRACIN 1 APPLICATION(S): 500 OINTMENT OPHTHALMIC at 05:06

## 2021-04-07 RX ADMIN — Medication 1: at 07:52

## 2021-04-07 RX ADMIN — BACITRACIN 1 APPLICATION(S): 500 OINTMENT OPHTHALMIC at 13:51

## 2021-04-07 RX ADMIN — APIXABAN 2.5 MILLIGRAM(S): 2.5 TABLET, FILM COATED ORAL at 05:06

## 2021-04-07 RX ADMIN — Medication 4: at 12:08

## 2021-04-07 RX ADMIN — Medication 1 DROP(S): at 05:07

## 2021-04-07 RX ADMIN — Medication 50 MILLIGRAM(S): at 05:06

## 2021-04-07 RX ADMIN — Medication 1 DROP(S): at 13:51

## 2021-04-07 RX ADMIN — Medication 81 MILLIGRAM(S): at 12:22

## 2021-04-07 RX ADMIN — Medication 1 GRAM(S): at 05:06

## 2021-04-07 NOTE — PROGRESS NOTE ADULT - PROBLEM SELECTOR PLAN 1
COVID   Decadron and remdisivir   Supplemental oxygen as needed   Monitor Dimer

## 2021-04-07 NOTE — PROGRESS NOTE ADULT - PROVIDER SPECIALTY LIST ADULT
Internal Medicine
Cardiology
Infectious Disease
Internal Medicine
Infectious Disease
Cardiology
Cardiology

## 2021-04-07 NOTE — PROGRESS NOTE ADULT - ASSESSMENT
89 y/o F PMHx of CAD, DM, HLD, GERD, Afib on Eliquis, ?CHF (pt reports being on "water pills"), COVID-19 (+) admitted with PNA
89 y/o F PMHx of CAD, DM, HLD, GERD, Afib on Eliquis, CHF (pt reports being on "water pills"), COVID-19 (+) on 4/1 presents to ED . BIBEMS.  She is afebrile and breathing comfortably on room air but has definite crackles at the left base and multiple risk factors for progression to  COVID pneumonia. seen by ID in the ED        ID- COVID pneumonia:  follow up on CXR- get chest CT  blood cultures x2   trend inflammatory markers for COVID: ferritin, CRP, D-dimer  check procalcitonin  supplemental oxygen as needed  start remdesivir protocol  monitor creatinine, monitor LFTs  can hold off on dexamethasone unless markers elevated    Diabetes Mellitus:  monitor glucose  baseline meds- sliding scale    CAD :cont outptn meds, check TTE    DVT ppx not necessary, ptn is on full AC w Eliquis  GI ppx w po PPI      
89 y/o F PMHx of CAD, DM, HLD, GERD, Afib on Eliquis, ?CHF (pt reports being on "water pills"), COVID-19 (+) yesterday presents to ED BIBEMS.  She is afebrile and breathing room air but has definite crackles at the left base and multiple risk factors for progression of her COVID pneumonia    Would:  Admit to medicine    COVID pneumonia:    blood cultures x2 sets- negative  send inflammatory markers for COVID- LDH, ferritin, CRP, procalcitonin, D-dimers  supplemental oxygen as needed- not requiring  complete remdesivir protocol  monitor creatinine, monitor LFTs  can hold off on dexamethasone as markers of inflammation chris    Diabetes Mellitus:  monitor glucose  baseline meds- sliding scale    CHF component:  cardiology following    right eye with erythema  consider optho consult  would treat wtih abx cream    discharge planing    Aime La MD  [phone]  After 5pm/weekends [phone]      
89 y/o F PMHx of CAD, DM, HLD, GERD, Afib on Eliquis, CHF (pt reports being on "water pills"), COVID-19 (+) on 4/1 presents to ED . BIBEMS.  She is afebrile and breathing comfortably on room air but has definite crackles at the left base and multiple risk factors for progression to  COVID pneumonia. seen by ID in the ED        ID- COVID pneumonia:  blood cultures x2 NTD  trend inflammatory markers for COVID: ferritin, CRP, D-dimer  supplemental oxygen as needed  on remdesivir protocol  can hold off on dexamethasone unless markers elevated. ptn not hypoxic    Diabetes Mellitus:  monitor glucose  baseline meds- sliding scale    CAD :cont outptn meds, check TTE. on ASA, BB, statin  AFIB- w RVR in the setting of acute covid, rate now controlled, on Eliquis for full AC    DVT ppx not necessary, ptn is on full AC w Eliquis  GI ppx w po PPI      
89 y/o F PMHx of CAD, DM, HLD, GERD, Afib on Eliquis, ?CHF (pt reports being on "water pills"), COVID-19 (+) yesterday presents to ED BIBEMS.  She is afebrile and breathing room air but has definite crackles at the left base and multiple risk factors for progression of her COVID pneumonia    Would:  Admit to medicine    COVID pneumonia:  completed remdesivir protocol  stable on room air    Diabetes Mellitus:  monitor glucose  baseline meds- sliding scale    CHF component:  cardiology following    right eye with erythema  consider optho consult  would treat wtih abx cream    discharge planing    Aime La MD  [phone]  After 5pm/weekends [phone]      
89 y/o F PMHx of CAD, DM, HLD, GERD, Afib on Eliquis, CHF (pt reports being on "water pills"), COVID-19 (+) on 4/1 presents to ED . BIBEMS.  She is afebrile and breathing comfortably on room air but has definite crackles at the left base and multiple risk factors for progression to  COVID pneumonia. seen by ID in the ED        ID- COVID pneumonia:  follow up on CXR- get chest CT  blood cultures x2   trend inflammatory markers for COVID: ferritin, CRP, D-dimer  check procalcitonin  supplemental oxygen as needed  start remdesivir protocol  monitor creatinine, monitor LFTs  can hold off on dexamethasone unless markers elevated    Diabetes Mellitus:  monitor glucose  baseline meds- sliding scale    CAD :cont outptn meds, check TTE    DVT ppx not necessary, ptn is on full AC w Eliquis  GI ppx w po PPI      
89 y/o F PMHx of CAD, DM, HLD, GERD, Afib on Eliquis, CHF (pt reports being on "water pills"), COVID-19 (+) on 4/1 presents to ED . BIBEMS.  She is afebrile and breathing comfortably on room air but has definite crackles at the left base and multiple risk factors for progression to  COVID pneumonia. seen by ID in the ED        ID- COVID pneumonia:  blood cultures x2 NTD  trend inflammatory markers for COVID: ferritin, CRP, D-dimer  supplemental oxygen as needed  on remdesivir protocol  can hold off on dexamethasone unless markers elevated. ptn not hypoxic    Diabetes Mellitus:  monitor glucose  baseline meds- sliding scale    CAD :cont outptn meds, check TTE. on ASA, BB, statin  AFIB- w RVR in the setting of acute covid, rate now controlled, on Eliquis for full AC    DVT ppx not necessary, ptn is on full AC w Eliquis  GI ppx w po PPI      
89 y/o F PMHx of CAD, DM, HLD, GERD, Afib on Eliquis, CHF (pt reports being on "water pills"), COVID-19 (+) on 4/1 presents to ED . BIBEMS.  She is afebrile and breathing comfortably on room air but has definite crackles at the left base and multiple risk factors for progression to  COVID pneumonia. seen by ID in the ED        ID- COVID pneumonia:  blood cultures x2 NTD  trend inflammatory markers for COVID: ferritin, CRP, D-dimer  supplemental oxygen as needed  on remdesivir protocol  can hold off on dexamethasone unless markers elevated. ptn not hypoxic    Diabetes Mellitus:  monitor glucose  baseline meds- sliding scale    CAD :cont outptn meds, check TTE. on ASA, BB, statin  AFIB- w RVR in the setting of acute covid, rate now controlled, on Eliquis for full AC    DVT ppx not necessary, ptn is on full AC w Eliquis  GI ppx w po PPI      
  89 y/o F PMHx of CAD, DM, HLD, GERD, Afib on Eliquis, ?CHF (pt reports being on "water pills"), COVID-19 (+) admitted with PNA
  89 y/o F PMHx of CAD, DM, HLD, GERD, Afib on Eliquis, ?CHF (pt reports being on "water pills"), COVID-19 (+) admitted with PNA

## 2021-04-07 NOTE — CHART NOTE - NSCHARTNOTEFT_GEN_A_CORE
Pt medically cleared for discharge by Dr. Pickard. Pt completed remdesivir yesterday, stable on Room Air. No complaints. Pt requesting to go home. Medications reviewed with jama Hernández, including decreased dose of eliquis and addition of asa. As per Betty patient with issues with ASA in the past incuding anemia. Requesting to continue only Eliquis at this time. Dr. Beltran agrees with plan. For follow up with home MD and cardiology.

## 2021-04-07 NOTE — PROGRESS NOTE ADULT - TIME-BASED BILLING (NON-CRITICAL CARE)
Time-based billing (NON-critical care)

## 2021-04-07 NOTE — DISCHARGE NOTE NURSING/CASE MANAGEMENT/SOCIAL WORK - PATIENT PORTAL LINK FT
You can access the FollowMyHealth Patient Portal offered by [facility] by registering at the following website: [email or web address]. By joining [employer]’s FollowMyHealth portal, you will also be able to view your health information using other applications (apps) compatible with our system.

## 2021-04-07 NOTE — PROGRESS NOTE ADULT - TIME BILLING
Advanced care planning was discussed with patient and family.  Advanced care planning forms were reviewed and discussed as appropriate.  Differential diagnosis and plan of care discussed with patient after the evaluation.   Pain assessed and judicious use of narcotics when appropriate was discussed.  Importance of Fall prevention discussed.  Counseling on Smoking and Alcohol cessation was offered when appropriate.  Counseling on Diet, exercise, and medication compliance was done.
ptn, ID, card
Advanced care planning was discussed with patient and family.  Advanced care planning forms were reviewed and discussed as appropriate.  Differential diagnosis and plan of care discussed with patient after the evaluation.   Pain assessed and judicious use of narcotics when appropriate was discussed.  Importance of Fall prevention discussed.  Counseling on Smoking and Alcohol cessation was offered when appropriate.  Counseling on Diet, exercise, and medication compliance was done.
Advanced care planning was discussed with patient and family.  Advanced care planning forms were reviewed and discussed as appropriate.  Differential diagnosis and plan of care discussed with patient after the evaluation.   Pain assessed and judicious use of narcotics when appropriate was discussed.  Importance of Fall prevention discussed.  Counseling on Smoking and Alcohol cessation was offered when appropriate.  Counseling on Diet, exercise, and medication compliance was done.

## 2021-04-07 NOTE — PROGRESS NOTE ADULT - SUBJECTIVE AND OBJECTIVE BOX
Patient is a 89y old  Female who presents with a chief complaint of covid (07 Apr 2021 11:04)      SUBJECTIVE / OVERNIGHT EVENTS: awaiting DC home    MEDICATIONS  (STANDING):  apixaban 2.5 milliGRAM(s) Oral two times a day  artificial  tears Solution 1 Drop(s) Both EYES three times a day  aspirin enteric coated 81 milliGRAM(s) Oral daily  atorvastatin 10 milliGRAM(s) Oral at bedtime  BACItracin   Ophthalmic Ointment 1 Application(s) Right EYE three times a day  dextrose 40% Gel 15 Gram(s) Oral once  dextrose 5%. 1000 milliLiter(s) (50 mL/Hr) IV Continuous <Continuous>  dextrose 5%. 1000 milliLiter(s) (100 mL/Hr) IV Continuous <Continuous>  dextrose 50% Injectable 25 Gram(s) IV Push once  dextrose 50% Injectable 12.5 Gram(s) IV Push once  dextrose 50% Injectable 25 Gram(s) IV Push once  gabapentin 100 milliGRAM(s) Oral at bedtime  glucagon  Injectable 1 milliGRAM(s) IntraMuscular once  insulin lispro (ADMELOG) corrective regimen sliding scale   SubCutaneous at bedtime  insulin lispro (ADMELOG) corrective regimen sliding scale   SubCutaneous three times a day before meals  metoprolol succinate ER 50 milliGRAM(s) Oral daily  pantoprazole    Tablet 40 milliGRAM(s) Oral before breakfast  sodium chloride 0.45%. 1000 milliLiter(s) (50 mL/Hr) IV Continuous <Continuous>  sucralfate 1 Gram(s) Oral two times a day    MEDICATIONS  (PRN):  acetaminophen    Suspension .. 650 milliGRAM(s) Oral every 6 hours PRN Temp greater or equal to 38C (100.4F), Mild Pain (1 - 3)      Vital Signs Last 24 Hrs  T(F): 97.7 (04-07-21 @ 12:30), Max: 98.4 (04-06-21 @ 20:17)  HR: 84 (04-07-21 @ 12:30) (80 - 84)  BP: 149/70 (04-07-21 @ 12:30) (143/86 - 149/70)  RR: 18 (04-07-21 @ 12:30) (18 - 18)  SpO2: 98% (04-07-21 @ 12:30) (97% - 98%)        POCT Blood Glucose.: 310 mg/dL (07 Apr 2021 12:02)  POCT Blood Glucose.: 163 mg/dL (07 Apr 2021 07:46)  POCT Blood Glucose.: 243 mg/dL (06 Apr 2021 21:07)        PHYSICAL EXAM:  GENERAL: NAD, well-developed  HEAD:  Atraumatic, Normocephalic  EYES: EOMI, PERRLA, conjunctiva and sclera clear  NECK: Supple, No JVD  CHEST/LUNG: Clear to auscultation bilaterally; No wheeze  HEART: Regular rate and rhythm; No murmurs, rubs, or gallops  ABDOMEN: Soft, Nontender, Nondistended; Bowel sounds present  EXTREMITIES:  2+ Peripheral Pulses, No clubbing, cyanosis, or edema  PSYCH: AAOx3  NEUROLOGY: non-focal  SKIN: No rashes or lesions    LABS:                        9.6    5.35  )-----------( 203      ( 07 Apr 2021 06:04 )             33.0     04-07    139  |  105  |  18  ----------------------------<  160<H>  3.9   |  23  |  0.62    Ca    8.5      07 Apr 2021 06:04    TPro  6.1  /  Alb  3.1<L>  /  TBili  0.5  /  DBili  0.2  /  AST  17  /  ALT  7<L>  /  AlkPhos  69  04-07    PT/INR - ( 07 Apr 2021 06:04 )   PT: 25.3 sec;   INR: 2.19 ratio

## 2021-04-07 NOTE — PROGRESS NOTE ADULT - SUBJECTIVE AND OBJECTIVE BOX
Subjective: Patient seen and examined. No new events except as noted.     REVIEW OF SYSTEMS:    CONSTITUTIONAL: + weakness, fevers or chills  EYES/ENT: No visual changes;  No vertigo or throat pain   NECK: No pain or stiffness  RESPIRATORY: No cough, wheezing, hemoptysis; No shortness of breath  CARDIOVASCULAR: No chest pain or palpitations  GASTROINTESTINAL: No abdominal or epigastric pain. No nausea, vomiting, or hematemesis; No diarrhea or constipation. No melena or hematochezia.  GENITOURINARY: No dysuria, frequency or hematuria  NEUROLOGICAL: No numbness or weakness  SKIN: No itching, burning, rashes, or lesions   All other review of systems is negative unless indicated above.    MEDICATIONS:  MEDICATIONS  (STANDING):  apixaban 2.5 milliGRAM(s) Oral two times a day  artificial  tears Solution 1 Drop(s) Both EYES three times a day  aspirin enteric coated 81 milliGRAM(s) Oral daily  atorvastatin 10 milliGRAM(s) Oral at bedtime  BACItracin   Ophthalmic Ointment 1 Application(s) Right EYE three times a day  dextrose 40% Gel 15 Gram(s) Oral once  dextrose 5%. 1000 milliLiter(s) (50 mL/Hr) IV Continuous <Continuous>  dextrose 5%. 1000 milliLiter(s) (100 mL/Hr) IV Continuous <Continuous>  dextrose 50% Injectable 25 Gram(s) IV Push once  dextrose 50% Injectable 12.5 Gram(s) IV Push once  dextrose 50% Injectable 25 Gram(s) IV Push once  gabapentin 100 milliGRAM(s) Oral at bedtime  glucagon  Injectable 1 milliGRAM(s) IntraMuscular once  insulin lispro (ADMELOG) corrective regimen sliding scale   SubCutaneous three times a day before meals  insulin lispro (ADMELOG) corrective regimen sliding scale   SubCutaneous at bedtime  metoprolol succinate ER 50 milliGRAM(s) Oral daily  pantoprazole    Tablet 40 milliGRAM(s) Oral before breakfast  sodium chloride 0.45%. 1000 milliLiter(s) (50 mL/Hr) IV Continuous <Continuous>  sucralfate 1 Gram(s) Oral two times a day      PHYSICAL EXAM:  T(C): 36.4 (04-07-21 @ 04:55), Max: 36.9 (04-06-21 @ 20:17)  HR: 80 (04-07-21 @ 04:55) (80 - 87)  BP: 143/86 (04-07-21 @ 04:55) (141/66 - 148/77)  RR: 18 (04-07-21 @ 04:55) (17 - 18)  SpO2: 98% (04-07-21 @ 04:55) (97% - 98%)  Wt(kg): --  I&O's Summary    06 Apr 2021 07:01  -  07 Apr 2021 07:00  --------------------------------------------------------  IN: 240 mL / OUT: 1150 mL / NET: -910 mL    07 Apr 2021 07:01  -  07 Apr 2021 11:04  --------------------------------------------------------  IN: 275 mL / OUT: 350 mL / NET: -75 mL          Appearance: NAD	  HEENT:   Normal oral mucosa, PERRL, EOMI	  Lymphatic: No lymphadenopathy , no edema  Cardiovascular: Irregular S1 S2, No JVD, No murmurs , Peripheral pulses palpable 2+ bilaterally  Respiratory: Lungs clear to auscultation, normal effort 	  Gastrointestinal:  Soft, Non-tender, + BS	  Skin: No rashes, No ecchymoses, No cyanosis, warm to touch  Musculoskeletal: Normal range of motion, normal strength  Psychiatry:  Mood & affect appropriate  Ext: No edema      LABS:    CARDIAC MARKERS:                                9.6    5.35  )-----------( 203      ( 07 Apr 2021 06:04 )             33.0     04-07    139  |  105  |  18  ----------------------------<  160<H>  3.9   |  23  |  0.62    Ca    8.5      07 Apr 2021 06:04    TPro  6.1  /  Alb  3.1<L>  /  TBili  0.5  /  DBili  0.2  /  AST  17  /  ALT  7<L>  /  AlkPhos  69  04-07    proBNP:   Lipid Profile:   HgA1c:   TSH:             TELEMETRY: 	  AF  ECG:  	  RADIOLOGY:   DIAGNOSTIC TESTING:  [ ] Echocardiogram:  [ ]  Catheterization:  [ ] Stress Test:    OTHER:

## 2021-04-07 NOTE — PROGRESS NOTE ADULT - NSICDXPILOT_GEN_ALL_CORE
[place]

## 2021-06-03 PROCEDURE — 86140 C-REACTIVE PROTEIN: CPT

## 2021-06-03 PROCEDURE — 71045 X-RAY EXAM CHEST 1 VIEW: CPT

## 2021-06-03 PROCEDURE — 82565 ASSAY OF CREATININE: CPT

## 2021-06-03 PROCEDURE — 84145 PROCALCITONIN (PCT): CPT

## 2021-06-03 PROCEDURE — 83735 ASSAY OF MAGNESIUM: CPT

## 2021-06-03 PROCEDURE — 97165 OT EVAL LOW COMPLEX 30 MIN: CPT

## 2021-06-03 PROCEDURE — 81001 URINALYSIS AUTO W/SCOPE: CPT

## 2021-06-03 PROCEDURE — 80053 COMPREHEN METABOLIC PANEL: CPT

## 2021-06-03 PROCEDURE — 93005 ELECTROCARDIOGRAM TRACING: CPT

## 2021-06-03 PROCEDURE — 85610 PROTHROMBIN TIME: CPT

## 2021-06-03 PROCEDURE — 97110 THERAPEUTIC EXERCISES: CPT

## 2021-06-03 PROCEDURE — 85379 FIBRIN DEGRADATION QUANT: CPT

## 2021-06-03 PROCEDURE — U0003: CPT

## 2021-06-03 PROCEDURE — 80076 HEPATIC FUNCTION PANEL: CPT

## 2021-06-03 PROCEDURE — 82728 ASSAY OF FERRITIN: CPT

## 2021-06-03 PROCEDURE — 82962 GLUCOSE BLOOD TEST: CPT

## 2021-06-03 PROCEDURE — 36000 PLACE NEEDLE IN VEIN: CPT

## 2021-06-03 PROCEDURE — 83036 HEMOGLOBIN GLYCOSYLATED A1C: CPT

## 2021-06-03 PROCEDURE — 85025 COMPLETE CBC W/AUTO DIFF WBC: CPT

## 2021-06-03 PROCEDURE — 82248 BILIRUBIN DIRECT: CPT

## 2021-06-03 PROCEDURE — 85027 COMPLETE CBC AUTOMATED: CPT

## 2021-06-03 PROCEDURE — 99285 EMERGENCY DEPT VISIT HI MDM: CPT | Mod: 25

## 2021-06-03 PROCEDURE — 86769 SARS-COV-2 COVID-19 ANTIBODY: CPT

## 2021-06-03 PROCEDURE — 97116 GAIT TRAINING THERAPY: CPT

## 2021-06-03 PROCEDURE — 84484 ASSAY OF TROPONIN QUANT: CPT

## 2021-06-03 PROCEDURE — U0005: CPT

## 2021-06-03 PROCEDURE — 87086 URINE CULTURE/COLONY COUNT: CPT

## 2021-06-03 PROCEDURE — 97162 PT EVAL MOD COMPLEX 30 MIN: CPT

## 2021-06-03 PROCEDURE — 85730 THROMBOPLASTIN TIME PARTIAL: CPT

## 2021-06-03 PROCEDURE — 80048 BASIC METABOLIC PNL TOTAL CA: CPT

## 2021-06-03 PROCEDURE — C8929: CPT

## 2021-11-23 DIAGNOSIS — I51.3 INTRACARDIAC THROMBOSIS, NOT ELSEWHERE CLASSIFIED: ICD-10-CM

## 2021-11-23 DIAGNOSIS — U07.1 COVID-19: ICD-10-CM

## 2021-11-24 ENCOUNTER — NON-APPOINTMENT (OUTPATIENT)
Age: 86
End: 2021-11-24

## 2021-11-24 ENCOUNTER — LABORATORY RESULT (OUTPATIENT)
Age: 86
End: 2021-11-24

## 2021-11-24 ENCOUNTER — APPOINTMENT (OUTPATIENT)
Dept: CARDIOLOGY | Facility: CLINIC | Age: 86
End: 2021-11-24
Payer: MEDICARE

## 2021-11-24 VITALS
TEMPERATURE: 96.6 F | HEIGHT: 56 IN | HEART RATE: 144 BPM | OXYGEN SATURATION: 97 % | SYSTOLIC BLOOD PRESSURE: 146 MMHG | WEIGHT: 142 LBS | DIASTOLIC BLOOD PRESSURE: 84 MMHG | BODY MASS INDEX: 31.94 KG/M2

## 2021-11-24 DIAGNOSIS — R07.9 CHEST PAIN, UNSPECIFIED: ICD-10-CM

## 2021-11-24 PROCEDURE — 99205 OFFICE O/P NEW HI 60 MIN: CPT

## 2021-11-24 PROCEDURE — 93000 ELECTROCARDIOGRAM COMPLETE: CPT

## 2021-11-24 RX ORDER — APIXABAN 2.5 MG/1
2.5 TABLET, FILM COATED ORAL
Qty: 180 | Refills: 1 | Status: ACTIVE | COMMUNITY
Start: 2021-11-24

## 2021-11-24 NOTE — REVIEW OF SYSTEMS
[SOB] : shortness of breath [Dyspnea on exertion] : not dyspnea during exertion [Chest Discomfort] : chest discomfort [Lower Ext Edema] : lower extremity edema [Leg Claudication] : no intermittent leg claudication [Syncope] : no syncope [Negative] : Heme/Lymph

## 2021-11-24 NOTE — PHYSICAL EXAM

## 2021-11-24 NOTE — DISCUSSION/SUMMARY
[FreeTextEntry1] : The patient is a 89-year-old female here with her niece (nurse in [employer]) DM, htn, hyperlipidemia, CAD (LAD stent), chronic afib, COVID 4/21, severe AS, gastric resection remote past with chest pain of unclear significance.\par #1 CV- continue aspirin, add ranexa and reevaluate with niece on phone, \par #2 Afib- continue eliquis 2.5mg bid, change to toprol 25mg bid \par #2 AS- severe 0.8, conservative mgmt and lasix daily\par #3 Htn- accpetable for age\par #4 Lipids- recently d/c atorvastatin\par #5 DM- on januvia, glipizide and metformin\par #6 GI- on PPI, reflux and constipation\par #7 General- mostly house bound with aides and house calls, labs today including urine

## 2021-11-24 NOTE — HISTORY OF PRESENT ILLNESS
[FreeTextEntry1] : Mare is a 89-year-old female DM, CAD, htn, hyperlipidemia, afib, AS COVID 4/21 treated with remdesevir. \caryl Eliquis was decreased by Dr. Beltran. She recovered. Lately she feels pain chest that goes to her back. Niece is nurse. She was given simethicone and carafate not relief. She describes it like a cold in her chest lasting half hour but can not happen all day It happens when uses her arms especially when getting dressed. It does not happen when walking. She is sleeping more. Also happens more when nervous. \caryl Was treated at [facility] and house calls from there now. Never leaves the house even for the holidays.

## 2021-11-26 LAB
25(OH)D3 SERPL-MCNC: 22.9 NG/ML
ALBUMIN SERPL ELPH-MCNC: 4 G/DL
ALP BLD-CCNC: 83 U/L
ALT SERPL-CCNC: 15 U/L
ANION GAP SERPL CALC-SCNC: 15 MMOL/L
APPEARANCE: CLEAR
AST SERPL-CCNC: 24 U/L
BASOPHILS # BLD AUTO: 0.04 K/UL
BASOPHILS NFR BLD AUTO: 0.4 %
BILIRUB SERPL-MCNC: 0.8 MG/DL
BILIRUBIN URINE: NEGATIVE
BLOOD URINE: NEGATIVE
BUN SERPL-MCNC: 16 MG/DL
CALCIUM SERPL-MCNC: 9.6 MG/DL
CHLORIDE SERPL-SCNC: 101 MMOL/L
CHOLEST SERPL-MCNC: 130 MG/DL
CO2 SERPL-SCNC: 23 MMOL/L
COLOR: YELLOW
CREAT SERPL-MCNC: 0.83 MG/DL
EOSINOPHIL # BLD AUTO: 0.17 K/UL
EOSINOPHIL NFR BLD AUTO: 1.6 %
ESTIMATED AVERAGE GLUCOSE: 146 MG/DL
GLUCOSE QUALITATIVE U: NEGATIVE
GLUCOSE SERPL-MCNC: 123 MG/DL
HBA1C MFR BLD HPLC: 6.7 %
HCT VFR BLD CALC: 39.6 %
HDLC SERPL-MCNC: 30 MG/DL
HGB BLD-MCNC: 11.9 G/DL
IMM GRANULOCYTES NFR BLD AUTO: 0.4 %
KETONES URINE: NEGATIVE
LDLC SERPL CALC-MCNC: 77 MG/DL
LEUKOCYTE ESTERASE URINE: NEGATIVE
LYMPHOCYTES # BLD AUTO: 1.54 K/UL
LYMPHOCYTES NFR BLD AUTO: 14.5 %
MAN DIFF?: NORMAL
MCHC RBC-ENTMCNC: 27.9 PG
MCHC RBC-ENTMCNC: 30.1 GM/DL
MCV RBC AUTO: 93 FL
MONOCYTES # BLD AUTO: 0.62 K/UL
MONOCYTES NFR BLD AUTO: 5.8 %
NEUTROPHILS # BLD AUTO: 8.24 K/UL
NEUTROPHILS NFR BLD AUTO: 77.3 %
NITRITE URINE: NEGATIVE
NONHDLC SERPL-MCNC: 100 MG/DL
PH URINE: 5.5
PLATELET # BLD AUTO: 261 K/UL
POTASSIUM SERPL-SCNC: 4.9 MMOL/L
PROT SERPL-MCNC: 7.4 G/DL
PROTEIN URINE: ABNORMAL
RBC # BLD: 4.26 M/UL
RBC # FLD: 14.8 %
SODIUM SERPL-SCNC: 139 MMOL/L
SPECIFIC GRAVITY URINE: 1.02
TRIGL SERPL-MCNC: 115 MG/DL
TSH SERPL-ACNC: 3.34 UIU/ML
UROBILINOGEN URINE: NORMAL
VIT B12 SERPL-MCNC: 334 PG/ML
WBC # FLD AUTO: 10.65 K/UL

## 2022-01-26 ENCOUNTER — APPOINTMENT (OUTPATIENT)
Dept: CARDIOLOGY | Facility: CLINIC | Age: 87
End: 2022-01-26

## 2022-06-28 ENCOUNTER — EMERGENCY (EMERGENCY)
Facility: HOSPITAL | Age: 87
LOS: 1 days | Discharge: HOME | End: 2022-06-28
Attending: EMERGENCY MEDICINE
Payer: MEDICARE

## 2022-06-28 VITALS
DIASTOLIC BLOOD PRESSURE: 72 MMHG | HEART RATE: 89 BPM | TEMPERATURE: 98 F | SYSTOLIC BLOOD PRESSURE: 100 MMHG | OXYGEN SATURATION: 96 % | RESPIRATION RATE: 22 BRPM

## 2022-06-28 VITALS
WEIGHT: 130.07 LBS | DIASTOLIC BLOOD PRESSURE: 78 MMHG | HEIGHT: 61 IN | SYSTOLIC BLOOD PRESSURE: 162 MMHG | HEART RATE: 135 BPM

## 2022-06-28 LAB
ALBUMIN SERPL ELPH-MCNC: 4.2 G/DL — SIGNIFICANT CHANGE UP (ref 3.3–5)
ALP SERPL-CCNC: 82 U/L — SIGNIFICANT CHANGE UP (ref 40–120)
ALT FLD-CCNC: 15 U/L — SIGNIFICANT CHANGE UP (ref 10–45)
ANION GAP SERPL CALC-SCNC: 13 MMOL/L — SIGNIFICANT CHANGE UP (ref 5–17)
APPEARANCE UR: CLEAR — SIGNIFICANT CHANGE UP
APTT BLD: 37.7 SEC — HIGH (ref 27.5–35.5)
AST SERPL-CCNC: 26 U/L — SIGNIFICANT CHANGE UP (ref 10–40)
BACTERIA # UR AUTO: NEGATIVE — SIGNIFICANT CHANGE UP
BASOPHILS # BLD AUTO: 0.03 K/UL — SIGNIFICANT CHANGE UP (ref 0–0.2)
BASOPHILS NFR BLD AUTO: 0.3 % — SIGNIFICANT CHANGE UP (ref 0–2)
BILIRUB SERPL-MCNC: 0.7 MG/DL — SIGNIFICANT CHANGE UP (ref 0.2–1.2)
BILIRUB UR-MCNC: NEGATIVE — SIGNIFICANT CHANGE UP
BUN SERPL-MCNC: 17 MG/DL — SIGNIFICANT CHANGE UP (ref 7–23)
CALCIUM SERPL-MCNC: 9.9 MG/DL — SIGNIFICANT CHANGE UP (ref 8.4–10.5)
CHLORIDE SERPL-SCNC: 98 MMOL/L — SIGNIFICANT CHANGE UP (ref 96–108)
CO2 SERPL-SCNC: 28 MMOL/L — SIGNIFICANT CHANGE UP (ref 22–31)
COLOR SPEC: SIGNIFICANT CHANGE UP
CREAT SERPL-MCNC: 0.98 MG/DL — SIGNIFICANT CHANGE UP (ref 0.5–1.3)
DIFF PNL FLD: NEGATIVE — SIGNIFICANT CHANGE UP
EGFR: 53 ML/MIN/1.73M2 — LOW
EOSINOPHIL # BLD AUTO: 0.18 K/UL — SIGNIFICANT CHANGE UP (ref 0–0.5)
EOSINOPHIL NFR BLD AUTO: 1.8 % — SIGNIFICANT CHANGE UP (ref 0–6)
EPI CELLS # UR: 2 /HPF — SIGNIFICANT CHANGE UP
GLUCOSE SERPL-MCNC: 218 MG/DL — HIGH (ref 70–99)
GLUCOSE UR QL: NEGATIVE — SIGNIFICANT CHANGE UP
HCT VFR BLD CALC: 41.6 % — SIGNIFICANT CHANGE UP (ref 34.5–45)
HGB BLD-MCNC: 12.8 G/DL — SIGNIFICANT CHANGE UP (ref 11.5–15.5)
HYALINE CASTS # UR AUTO: 2 /LPF — SIGNIFICANT CHANGE UP (ref 0–2)
IMM GRANULOCYTES NFR BLD AUTO: 0.7 % — SIGNIFICANT CHANGE UP (ref 0–1.5)
INR BLD: 1.62 RATIO — HIGH (ref 0.88–1.16)
KETONES UR-MCNC: NEGATIVE — SIGNIFICANT CHANGE UP
LEUKOCYTE ESTERASE UR-ACNC: NEGATIVE — SIGNIFICANT CHANGE UP
LYMPHOCYTES # BLD AUTO: 1.46 K/UL — SIGNIFICANT CHANGE UP (ref 1–3.3)
LYMPHOCYTES # BLD AUTO: 14.9 % — SIGNIFICANT CHANGE UP (ref 13–44)
MAGNESIUM SERPL-MCNC: 1.8 MG/DL — SIGNIFICANT CHANGE UP (ref 1.6–2.6)
MCHC RBC-ENTMCNC: 27.9 PG — SIGNIFICANT CHANGE UP (ref 27–34)
MCHC RBC-ENTMCNC: 30.8 GM/DL — LOW (ref 32–36)
MCV RBC AUTO: 90.8 FL — SIGNIFICANT CHANGE UP (ref 80–100)
MONOCYTES # BLD AUTO: 0.58 K/UL — SIGNIFICANT CHANGE UP (ref 0–0.9)
MONOCYTES NFR BLD AUTO: 5.9 % — SIGNIFICANT CHANGE UP (ref 2–14)
NEUTROPHILS # BLD AUTO: 7.49 K/UL — HIGH (ref 1.8–7.4)
NEUTROPHILS NFR BLD AUTO: 76.4 % — SIGNIFICANT CHANGE UP (ref 43–77)
NITRITE UR-MCNC: NEGATIVE — SIGNIFICANT CHANGE UP
NRBC # BLD: 0 /100 WBCS — SIGNIFICANT CHANGE UP (ref 0–0)
PH UR: 7.5 — SIGNIFICANT CHANGE UP (ref 5–8)
PHOSPHATE SERPL-MCNC: 3.5 MG/DL — SIGNIFICANT CHANGE UP (ref 2.5–4.5)
PLATELET # BLD AUTO: 240 K/UL — SIGNIFICANT CHANGE UP (ref 150–400)
POTASSIUM SERPL-MCNC: 4.5 MMOL/L — SIGNIFICANT CHANGE UP (ref 3.5–5.3)
POTASSIUM SERPL-SCNC: 4.5 MMOL/L — SIGNIFICANT CHANGE UP (ref 3.5–5.3)
PROT SERPL-MCNC: 7.6 G/DL — SIGNIFICANT CHANGE UP (ref 6–8.3)
PROT UR-MCNC: SIGNIFICANT CHANGE UP
PROTHROM AB SERPL-ACNC: 18.7 SEC — HIGH (ref 10.5–13.4)
RBC # BLD: 4.58 M/UL — SIGNIFICANT CHANGE UP (ref 3.8–5.2)
RBC # FLD: 13.9 % — SIGNIFICANT CHANGE UP (ref 10.3–14.5)
RBC CASTS # UR COMP ASSIST: 2 /HPF — SIGNIFICANT CHANGE UP (ref 0–4)
SODIUM SERPL-SCNC: 139 MMOL/L — SIGNIFICANT CHANGE UP (ref 135–145)
SP GR SPEC: 1.01 — SIGNIFICANT CHANGE UP (ref 1.01–1.02)
TROPONIN T, HIGH SENSITIVITY RESULT: 39 NG/L — SIGNIFICANT CHANGE UP (ref 0–51)
UROBILINOGEN FLD QL: NEGATIVE — SIGNIFICANT CHANGE UP
WBC # BLD: 9.81 K/UL — SIGNIFICANT CHANGE UP (ref 3.8–10.5)
WBC # FLD AUTO: 9.81 K/UL — SIGNIFICANT CHANGE UP (ref 3.8–10.5)
WBC UR QL: 1 /HPF — SIGNIFICANT CHANGE UP (ref 0–5)

## 2022-06-28 PROCEDURE — 72125 CT NECK SPINE W/O DYE: CPT | Mod: 26,MG

## 2022-06-28 PROCEDURE — 93005 ELECTROCARDIOGRAM TRACING: CPT | Mod: XU

## 2022-06-28 PROCEDURE — 51701 INSERT BLADDER CATHETER: CPT

## 2022-06-28 PROCEDURE — 84484 ASSAY OF TROPONIN QUANT: CPT

## 2022-06-28 PROCEDURE — 99285 EMERGENCY DEPT VISIT HI MDM: CPT | Mod: GC

## 2022-06-28 PROCEDURE — 93010 ELECTROCARDIOGRAM REPORT: CPT

## 2022-06-28 PROCEDURE — 85025 COMPLETE CBC W/AUTO DIFF WBC: CPT

## 2022-06-28 PROCEDURE — 71045 X-RAY EXAM CHEST 1 VIEW: CPT

## 2022-06-28 PROCEDURE — 36415 COLL VENOUS BLD VENIPUNCTURE: CPT

## 2022-06-28 PROCEDURE — G1004: CPT

## 2022-06-28 PROCEDURE — 84100 ASSAY OF PHOSPHORUS: CPT

## 2022-06-28 PROCEDURE — 87086 URINE CULTURE/COLONY COUNT: CPT

## 2022-06-28 PROCEDURE — 85730 THROMBOPLASTIN TIME PARTIAL: CPT

## 2022-06-28 PROCEDURE — 70450 CT HEAD/BRAIN W/O DYE: CPT | Mod: 26,MG

## 2022-06-28 PROCEDURE — 81001 URINALYSIS AUTO W/SCOPE: CPT

## 2022-06-28 PROCEDURE — 70450 CT HEAD/BRAIN W/O DYE: CPT | Mod: MG

## 2022-06-28 PROCEDURE — 85610 PROTHROMBIN TIME: CPT

## 2022-06-28 PROCEDURE — 72125 CT NECK SPINE W/O DYE: CPT | Mod: MG

## 2022-06-28 PROCEDURE — 83735 ASSAY OF MAGNESIUM: CPT

## 2022-06-28 PROCEDURE — 72170 X-RAY EXAM OF PELVIS: CPT

## 2022-06-28 PROCEDURE — 99285 EMERGENCY DEPT VISIT HI MDM: CPT | Mod: 25

## 2022-06-28 PROCEDURE — 71045 X-RAY EXAM CHEST 1 VIEW: CPT | Mod: 26

## 2022-06-28 PROCEDURE — 80053 COMPREHEN METABOLIC PANEL: CPT

## 2022-06-28 PROCEDURE — 72170 X-RAY EXAM OF PELVIS: CPT | Mod: 26

## 2022-06-28 NOTE — ED PROVIDER NOTE - PHYSICAL EXAMINATION
Gen: AAOx3, non-toxic  Head: NCAT  HEENT: EOMI, oral mucosa moist, normal conjunctiva  Lung: CTAB, no respiratory distress, no wheezes/rhonchi/rales B/L, speaking in full sentences  CV: RRR, no murmurs, rubs or gallops  Back: no step-offs or bony deformities, nontender  Abd: soft, NTND, no guarding, no CVA tenderness  MSK: no visible deformities  Neuro: No focal sensory or motor deficits  Skin: Warm, well perfused, no rash, no lacerations or abrasions, no ecchymoses   Psych: normal affect.   ~Mary Doe M.D. Resident

## 2022-06-28 NOTE — ED PROVIDER NOTE - ATTENDING CONTRIBUTION TO CARE
pt is a 89 y/o female with h/o afib, dm, on ac presents with mechanical fall might of hit her head, no loc, nc/at no cspine tend, no other injuries noted, ct head cspine ordered, well appearing, check labs due to history.

## 2022-06-28 NOTE — ED PROVIDER NOTE - NS ED ROS FT
GENERAL: No fever or chills, EYES: no change in vision, HEENT: no trouble swallowing or speaking, +NECK PAIN; CARDIAC: no chest pain, PULMONARY: no cough or SOB, GI: no abdominal pain, no nausea, no vomiting, no diarrhea or constipation, : No changes in urination, SKIN: no rashes, NEURO: no headache,  MSK: No joint pain     All other ROS negative unless otherwise specified in HPI.     ~Mary Doe M.D. Resident

## 2022-06-28 NOTE — ED ADULT NURSE NOTE - NSHOSCREENINGQ1_ED_ALL_ED
[de-identified] : Romario is a 4 year old male with [place] [facility] [place] Muskogee No

## 2022-06-28 NOTE — ED ADULT NURSE NOTE - OBJECTIVE STATEMENT
Patient is a 89 year old female BIB EMS s/p fall. Patient AOX3. Patient states she was trying to get off of a chair when she lost her balance and fell on the floor. She recalls falling on her left side. She screamed for help and her neighbor came and called EMS. Patient lives at home where she has the help of an aid 6 hours a day. Patient is currently complaining of neck tenderness. She denies pain in her head at this time. Patient is currently taking Eliquis for afib. Patient's niece works at [employer] and came to ED to help with history. Patient is a 89 year old female BIB EMS s/p fall. Patient AOX3. Patient states she was trying to get off of a chair when she lost her balance and fell on the floor. She recalls falling on her left side. She screamed for help and her neighbor came and called EMS. Patient lives at home where she has the help of an aid 6 hours a day. Patient is currently complaining of neck tenderness. Patient placed in C collar. Pupils are equal, round and reactive to light. There is no bruising or deformity to the head. No deformities or step offs to the spine. No tenderness or redness noted.  She denies pain in her head at this time. Lungs are clear and equal bilaterally. Heart rate and rhythm are regular. Abdomen is soft, nontender, nondistended. Peripheral pulses are strong. Lower extremities are nontender to palpation. Patient is currently taking Eliquis for afib. Patient's niece works at [employer] and came to ED to help with history.

## 2022-06-28 NOTE — ED PROVIDER NOTE - OBJECTIVE STATEMENT
89yoF w/Hx of CAD, DM, HLD, GERD, atrial thrombus, Afib on Eliquis, p/w mechanical fall. Pt has 24hr home health aides ... 89yoF w/Hx of CAD, DM, HLD, GERD, atrial thrombus, Afib on Eliquis, p/w mechanical fall. Pt has 24hr home health aides but aide did not witness fall. Pt was trying to sit down on couch when she slipped and fell onto her L side, endorses head trauma, denies LOC, no prodromal dizziness/lightheadedness/SOB/CP, was able to ambulate afterwards. Pt c/o mild neck pain, otherwise no complaints. She denies HA, SOB, CP, palpitations, dizziness/lightheadedness, numbness/tingling, n/v/d/c, fevers/chills, dysuria/hematuria.

## 2022-06-28 NOTE — ED PROVIDER NOTE - PATIENT PORTAL LINK FT
You can access the FollowMyHealth Patient Portal offered by [facility] by registering at the following website: [email or web address]. By joining [facility]’s FollowMyHealth portal, you will also be able to view your health information using other applications (apps) compatible with our system.

## 2022-06-28 NOTE — ED PROVIDER NOTE - NSFOLLOWUPINSTRUCTIONS_ED_ALL_ED_FT
You were seen and evaluated in the Emergency Department for your mechanical fall.     Clinical examination and history demonstrated no acute evidence of any life-threatening risks to your health as a result of your fall.     CT scan of your head and neck demonstrated no acute fractures, dislocations, or hemorrhage.     While emergent evaluation does not demonstrate any immediate life-threats, we strongly recommend you follow up with primary care doctor for a comprehensive evaluation of your health.     Should you develop nausea, vomiting, worsening headaches, inability to walk properly, numbness or tingling in the extremities, dizziness, or changes to your hearing/vision - please immediately return to the Emergency Department for evaluation of your symptoms.     Should your headaches persist, you develop concussion symptoms (see attached packet) you can call the following number to schedule an appointment with our Neurology partners:    [facility]  Neurology  [address] [place] [address] [address] [place] [address]  Phone: [phone]

## 2022-06-28 NOTE — ED ADULT NURSE REASSESSMENT NOTE - NS ED NURSE REASSESS COMMENT FT1
Patient straight cathed for urine using sterile technique. Second RN present to confirm sterility. Explained procedure as it was being done - Pt tolerated procedure well. Sterile specimens collected and sent to lab as ordered. drained aprox 100 ml of urine. Comfort and safety provided.
1.79

## 2022-06-28 NOTE — ED ADULT TRIAGE NOTE - PAIN: PRESENCE, MLM
-- DO NOT REPLY / DO NOT REPLY ALL --  -- Message is from the [facility]--      Patient is requesting a medication refill - medication is on active list    Was Medication Pended? Yes.    Rx Name and Dose:  Listed     Duration: 180 days    Pharmacy  [id number] - [place] [address]    Patient confirmed the above pharmacy as correct?  Yes    Caller Information       Type Contact Phone    02/15/2021 01:48 PM CST Phone (Incoming) YOHAN NAJERA (Emergency Contact) [phone]          Alternative phone number: none    Turnaround time given to caller:   \"This message will be sent to [state Provider's name]. The clinical team will fulfill your request as soon as they review your message.\"  
No protocol    Pt seen 06/03/2020     Next rajeev is 03/30/2021    Last refill was 01/05/2021    Please E - Prescribe if is appropriate      
complains of pain/discomfort

## 2022-06-28 NOTE — ED ADULT NURSE NOTE - NSFALLRSKHRMRISKTYPE_ED_ALL_ED
CC: +nausea this AM.  +poor po intake    Vitals:    12/23/21 1947 12/24/21 0431 12/24/21 0746 12/24/21 0947   BP: 100/64 135/67 110/62 122/70   BP Location: RUE - Right upper extremity RUE - Right upper extremity     Patient Position: Semi-Branham's Semi-Branham's     Pulse: 81 86 85 92   Resp: 16 16     Temp: 99.1 °F (37.3 °C) 98.8 °F (37.1 °C) 98.8 °F (37.1 °C) 98.1 °F (36.7 °C)   TempSrc: Oral Oral Oral Oral   SpO2: 96% 98% 96% 95%   Weight:       Height:           Intake/Output Summary (Last 24 hours) at 12/24/2021 0949  Last data filed at 12/24/2021 0900  Gross per 24 hour   Intake 480 ml   Output --   Net 480 ml     Gen-nad, alert and oriented  Heent-no masses, no lad, no jvp  Pulm-cta  CV-rrr  Abd-soft abd, NT  Ext-trace ble nonpitting edema    WBC (K/mcL)   Date Value   12/24/2021 9.2     RBC (mil/mcL)   Date Value   12/24/2021 2.38 (L)     HCT (%)   Date Value   12/24/2021 23.8 (L)     HGB (g/dL)   Date Value   12/24/2021 7.6 (L)     PLT (K/mcL)   Date Value   12/24/2021 217     Sodium (mmol/L)   Date Value   12/24/2021 130 (L)     Potassium (mmol/L)   Date Value   12/24/2021 5.3 (H)     Chloride (mmol/L)   Date Value   12/24/2021 101     Glucose (mg/dL)   Date Value   12/24/2021 191 (H)     Calcium (mg/dL)   Date Value   12/24/2021 8.4     Carbon Dioxide (mmol/L)   Date Value   12/24/2021 21     BUN (mg/dL)   Date Value   12/24/2021 65 (H)     Creatinine (mg/dL)   Date Value   12/24/2021 2.14 (H)     A/p:  1. GIACOMO   -suspect due to hemodynamic changes vs some mild abd compartment syndrome from constipation and gas distension   -currently off IVFs, suspect hypoNa due to volume depletion and aldactone,will keep on mild fluids again, needs IV access   -discussed with nurse   -keep strict I/o    -CPK mildly elevated, will check repeat levels now   -renal US for CKD changes confirm CKD 3b, small simple cysts   -cont to hold aldactone   -cont on low K diet   -will avoid lokelma/other products that may further  exacerbate GI symptoms for now     2. Anemia   -severe iron deficiency   -tolerating IV iron, although did infiltrate today, ok for PICC line if needed   -Heme following    3. Hyponatremia   -off aldactone   -off IVFs   -monitor back on IVFs for now   age(85 years old or older)/coagulation(Bleeding disorder R/T clinical cond/anti-coags)

## 2022-06-28 NOTE — ED PROVIDER NOTE - PROGRESS NOTE DETAILS
Анна Mariano MD, PGY-3: ct negative, ua negative, family at bedside for transport, ready for dc home.

## 2022-06-29 LAB
CULTURE RESULTS: SIGNIFICANT CHANGE UP
SPECIMEN SOURCE: SIGNIFICANT CHANGE UP

## 2022-09-08 NOTE — PROGRESS NOTE ADULT - SUBJECTIVE AND OBJECTIVE BOX
[facility]      Seen earlier today, and examined.  - Today, patient is without complaints.           *****MEDICATIONS: Current medication reviewed and documented.    MEDICATIONS  (STANDING):    MEDICATIONS  (PRN):          ***** VITAL SIGNS:  T(F): 97.4 (19 @ 11:18), Max: 97.4 (19 @ 11:18)  HR: 94 (19 @ 11:18) (94 - 94)  BP: 113/76 (19 @ 11:18) (113/76 - 113/76)  RR: 18 (19 @ 11:18) (18 - 18)  SpO2: 94% (19 @ 11:18) (94% - 94%)  Wt(kg): --  ,   I&O's Summary    2019 07:01  -  2019 07:00  --------------------------------------------------------  IN: 240 mL / OUT: 0 mL / NET: 240 mL             *****PHYSICAL EXAM: Alert oriented x 2  Attention comprehension are fair. Able to name, repeat, without any difficulty.   Able to follow 1-2 step commands.     EOMI fundi not visualized,  VFF to confrontration  No facial asymmetry   Tongue is midline   Palate elevates symmetrically   Moving both upper ext symmetrically antigravity   b/l Iliopsoas 2/5   hamstrings 3/5 quad 3/5  dorsiflexion 3/5   patchy areas of numbness.  Gait : not assessed.  B/L down going toes          *****LAB AND IMAGIN.2   10.13 )-----------( 221      ( 2019 10:11 )             39.5                                      < from: CT Lumbar Spine No Cont (19 @ 11:32) >  Multilevel degenerative changes. Suggestion of at least moderate spinal   canal stenosis at L3-L4 and L4-L5 with right lateral moderate neural   foraminal narrowing at those levels.    Correlation with MRI of the lumbar spine may be obtained for further   evaluation.    < end of copied text >      [All pertinent recent Imaging/Reports reviewed]           *****A S S E S S M E N T   A N D   P L A N :    89 F PMH Afib eliquis here for generalized weakness x 1 week with positive FOBT at PCP office today; after mechanical fall last week where she was on the ground for a day and a half and was cleaned up by a neighbour. No infectious sxs. No head trauma during that fall. No LOC. Was in PCP's office today for 2 episodes of grossly bloody bowel movements and has low H&H at baseline. Patient feels weak and does not feel safe at home.        Problem/Recommendations 1:  fall mechanical vs. progressive deconditioning vs. microvascular disease   ct head c/w mild microvascular disease   pt eval may benefit from short term rehab discharge planning   home safety eval  social work consult for home health aide   ct lumbar spine  multilevel degenerative changes. moderate spinal stenosis   tylenol po prn for pain   gabapentin with good improvement of pain         Problem/Recommendations 2:afib on eliquis with GIB   s/p egd defer to gi       Thank you for allowing me to participate in the care of this patient. Please do not hesitate to call me if you have any  questions.        ________________  Luana Frederick MD  [facility]  [phone]      33 minutes spent on total encounter; more than 50 % of the visit was  spent counseling about plan of care, compliance to diet/exercise and medication regimen and or  coordinating care by the attending physician.      It is advised that stroke patients follow up with KRISTI Bradford @ [phone] in 1- 2 weeks.   Others please follow up with Dr. Michael Nissenbaum [phone] [place]

## 2022-11-02 ENCOUNTER — APPOINTMENT (OUTPATIENT)
Dept: CARDIOLOGY | Facility: CLINIC | Age: 87
End: 2022-11-02

## 2022-11-02 ENCOUNTER — NON-APPOINTMENT (OUTPATIENT)
Age: 87
End: 2022-11-02

## 2022-11-02 VITALS
BODY MASS INDEX: 30.37 KG/M2 | HEIGHT: 56 IN | SYSTOLIC BLOOD PRESSURE: 144 MMHG | OXYGEN SATURATION: 90 % | DIASTOLIC BLOOD PRESSURE: 97 MMHG | WEIGHT: 135 LBS | RESPIRATION RATE: 12 BRPM | HEART RATE: 94 BPM

## 2022-11-02 DIAGNOSIS — K21.9 GASTRO-ESOPHAGEAL REFLUX DISEASE W/OUT ESOPHAGITIS: ICD-10-CM

## 2022-11-02 DIAGNOSIS — I25.10 ATHEROSCLEROTIC HEART DISEASE OF NATIVE CORONARY ARTERY W/OUT ANGINA PECTORIS: ICD-10-CM

## 2022-11-02 DIAGNOSIS — E11.9 TYPE 2 DIABETES MELLITUS W/OUT COMPLICATIONS: ICD-10-CM

## 2022-11-02 DIAGNOSIS — I48.91 UNSPECIFIED ATRIAL FIBRILLATION: ICD-10-CM

## 2022-11-02 DIAGNOSIS — I35.0 NONRHEUMATIC AORTIC (VALVE) STENOSIS: ICD-10-CM

## 2022-11-02 PROCEDURE — 93000 ELECTROCARDIOGRAM COMPLETE: CPT

## 2022-11-02 PROCEDURE — 99214 OFFICE O/P EST MOD 30 MIN: CPT

## 2022-11-02 RX ORDER — METFORMIN HYDROCHLORIDE 850 MG/1
850 TABLET, COATED ORAL
Qty: 170 | Refills: 0 | Status: DISCONTINUED | COMMUNITY
Start: 2021-09-14

## 2022-11-02 RX ORDER — SILVER SULFADIAZINE 10 G/1000G
1 CREAM TOPICAL
Qty: 400 | Refills: 0 | Status: DISCONTINUED | COMMUNITY
Start: 2022-08-09

## 2022-11-02 RX ORDER — BLOOD-GLUCOSE METER
W/DEVICE KIT MISCELLANEOUS
Qty: 1 | Refills: 0 | Status: DISCONTINUED | COMMUNITY
Start: 2022-06-26

## 2022-11-02 RX ORDER — ATORVASTATIN CALCIUM 10 MG/1
10 TABLET, FILM COATED ORAL
Qty: 30 | Refills: 0 | Status: DISCONTINUED | COMMUNITY
Start: 2021-11-23 | End: 2022-11-02

## 2022-11-02 RX ORDER — TOBRAMYCIN AND DEXAMETHASONE 3; 1 MG/ML; MG/ML
0.3-0.1 SUSPENSION/ DROPS OPHTHALMIC
Qty: 2 | Refills: 0 | Status: DISCONTINUED | COMMUNITY
Start: 2022-08-09

## 2022-11-02 RX ORDER — PANTOPRAZOLE 20 MG/1
20 TABLET, DELAYED RELEASE ORAL
Qty: 90 | Refills: 0 | Status: DISCONTINUED | COMMUNITY
Start: 2022-09-19

## 2022-11-02 RX ORDER — GLIPIZIDE 5 MG/1
5 TABLET ORAL
Qty: 180 | Refills: 0 | Status: DISCONTINUED | COMMUNITY
Start: 2022-06-20

## 2022-11-02 RX ORDER — METFORMIN HYDROCHLORIDE 500 MG/1
500 TABLET, EXTENDED RELEASE ORAL
Refills: 0 | Status: ACTIVE | COMMUNITY
Start: 2021-11-23

## 2022-11-02 RX ORDER — BLOOD SUGAR DIAGNOSTIC
STRIP MISCELLANEOUS
Qty: 50 | Refills: 0 | Status: DISCONTINUED | COMMUNITY
Start: 2022-06-26

## 2022-11-02 RX ORDER — SUCRALFATE 1 G/1
1 TABLET ORAL
Refills: 0 | Status: ACTIVE | COMMUNITY
Start: 2021-10-13

## 2022-11-02 RX ORDER — GLIPIZIDE 10 MG/1
10 TABLET, FILM COATED, EXTENDED RELEASE ORAL
Qty: 90 | Refills: 0 | Status: DISCONTINUED | COMMUNITY
Start: 2022-08-09

## 2022-11-02 RX ORDER — GLIPIZIDE 10 MG/1
10 TABLET ORAL
Refills: 2 | Status: ACTIVE | COMMUNITY
Start: 2021-11-23

## 2022-11-02 RX ORDER — OLOPATADINE HCL 1 MG/ML
0.1 SOLUTION/ DROPS OPHTHALMIC
Qty: 5 | Refills: 0 | Status: DISCONTINUED | COMMUNITY
Start: 2022-08-09

## 2022-11-02 RX ORDER — RANOLAZINE 500 MG/1
500 TABLET, EXTENDED RELEASE ORAL
Qty: 180 | Refills: 3 | Status: DISCONTINUED | COMMUNITY
Start: 2021-11-24 | End: 2022-11-02

## 2022-11-02 RX ORDER — AMOXICILLIN 250 MG/1
250 CAPSULE ORAL
Qty: 21 | Refills: 0 | Status: DISCONTINUED | COMMUNITY
Start: 2022-05-04

## 2022-11-02 RX ORDER — FUROSEMIDE 20 MG/1
20 TABLET ORAL
Qty: 30 | Refills: 0 | Status: ACTIVE | COMMUNITY
Start: 2021-11-23

## 2022-11-02 RX ORDER — CHLORHEXIDINE GLUCONATE, 0.12% ORAL RINSE 1.2 MG/ML
0.12 SOLUTION DENTAL
Qty: 473 | Refills: 0 | Status: DISCONTINUED | COMMUNITY
Start: 2022-05-04

## 2022-11-02 RX ORDER — SITAGLIPTIN 100 MG/1
100 TABLET, FILM COATED ORAL
Refills: 2 | Status: ACTIVE | COMMUNITY
Start: 2021-11-23

## 2022-11-02 RX ORDER — GABAPENTIN 100 MG/1
100 CAPSULE ORAL
Qty: 30 | Refills: 0 | Status: ACTIVE | COMMUNITY
Start: 2021-11-23

## 2022-11-02 RX ORDER — PANTOPRAZOLE 20 MG/1
20 TABLET, DELAYED RELEASE ORAL
Refills: 0 | Status: ACTIVE | COMMUNITY
Start: 2022-09-19

## 2022-11-02 NOTE — HISTORY OF PRESENT ILLNESS
[FreeTextEntry1] : Mare is here with her daughter. She has 10 rotten teeth. Three at a time and time in between to heal before impression for the dentures. Granddaughter is the dentist. Mare gets lightheaded when gets up. No CP, SOB or syncope.

## 2022-11-02 NOTE — PHYSICAL EXAM

## 2022-11-02 NOTE — DISCUSSION/SUMMARY
[FreeTextEntry1] : The patient is a 89-year-old female here with her niece (nurse in CTU) DM, htn, hyperlipidemia, CAD (LAD stent), chronic afib, COVID 4/21, severe AS, gastric resection remote past with orthostasis, weight loss and severe dental deterioration\par #1 CV- continue aspirin, off ranexa\par #2 Afib- continue eliquis 2.5mg bid,  toprol 25mg bid \par #2 AS- severe 0.8, conservative mgmt and lasix now every other day\par #3 Htn- acceptable for age\par #4 Lipids- recently d/c atorvastatin\par #5 DM- on januvia, glipizide and metformin\par #6 GI- on PPI, reflux and constipation\par #7 General- mostly house bound with aides and house calls\par Lengthy discussion re: removal 10 teeth, 3 at a time, and going on and off anticoagulation. Weight loss of concern and may be just from dental issues vs also from overall condition. Should they decide on proceeding would recommend spacing by a month each procedure. \par There are no cardiac contraindications to teeth extraction off eliquis two days prior to procedure.  [EKG obtained to assist in diagnosis and management of assessed problem(s)] : EKG obtained to assist in diagnosis and management of assessed problem(s)

## 2022-11-02 NOTE — REVIEW OF SYSTEMS
[SOB] : shortness of breath [Lower Ext Edema] : lower extremity edema [Negative] : Heme/Lymph [Weight Loss (___ Lbs)] : [unfilled] ~Ulb weight loss [Dyspnea on exertion] : not dyspnea during exertion [Chest Discomfort] : no chest discomfort [Leg Claudication] : no intermittent leg claudication [Syncope] : no syncope

## 2023-01-12 ENCOUNTER — INPATIENT (INPATIENT)
Facility: HOSPITAL | Age: 88
LOS: 5 days | Discharge: NURSING, REHAB OR LONG-TERM CARE | DRG: 871 | End: 2023-01-18
Attending: INTERNAL MEDICINE | Admitting: INTERNAL MEDICINE
Payer: MEDICARE

## 2023-01-12 VITALS
HEIGHT: 62 IN | HEART RATE: 108 BPM | DIASTOLIC BLOOD PRESSURE: 90 MMHG | OXYGEN SATURATION: 99 % | SYSTOLIC BLOOD PRESSURE: 128 MMHG | WEIGHT: 119.93 LBS | TEMPERATURE: 101 F | RESPIRATION RATE: 18 BRPM

## 2023-01-12 DIAGNOSIS — R06.02 SHORTNESS OF BREATH: ICD-10-CM

## 2023-01-12 DIAGNOSIS — I48.91 UNSPECIFIED ATRIAL FIBRILLATION: ICD-10-CM

## 2023-01-12 DIAGNOSIS — I25.10 ATHEROSCLEROTIC HEART DISEASE OF NATIVE CORONARY ARTERY WITHOUT ANGINA PECTORIS: ICD-10-CM

## 2023-01-12 DIAGNOSIS — A41.9 SEPSIS, UNSPECIFIED ORGANISM: ICD-10-CM

## 2023-01-12 DIAGNOSIS — E11.9 TYPE 2 DIABETES MELLITUS WITHOUT COMPLICATIONS: ICD-10-CM

## 2023-01-12 LAB
ALBUMIN SERPL ELPH-MCNC: 3.9 G/DL — SIGNIFICANT CHANGE UP (ref 3.3–5)
ALP SERPL-CCNC: 73 U/L — SIGNIFICANT CHANGE UP (ref 40–120)
ALT FLD-CCNC: 11 U/L — SIGNIFICANT CHANGE UP (ref 10–45)
ANION GAP SERPL CALC-SCNC: 12 MMOL/L — SIGNIFICANT CHANGE UP (ref 5–17)
APPEARANCE UR: CLEAR — SIGNIFICANT CHANGE UP
APTT BLD: 35 SEC — SIGNIFICANT CHANGE UP (ref 27.5–35.5)
AST SERPL-CCNC: 25 U/L — SIGNIFICANT CHANGE UP (ref 10–40)
BACTERIA # UR AUTO: NEGATIVE — SIGNIFICANT CHANGE UP
BASE EXCESS BLDV CALC-SCNC: 7 MMOL/L — HIGH (ref -2–3)
BASOPHILS # BLD AUTO: 0.02 K/UL — SIGNIFICANT CHANGE UP (ref 0–0.2)
BASOPHILS NFR BLD AUTO: 0.2 % — SIGNIFICANT CHANGE UP (ref 0–2)
BILIRUB SERPL-MCNC: 1.2 MG/DL — SIGNIFICANT CHANGE UP (ref 0.2–1.2)
BILIRUB UR-MCNC: NEGATIVE — SIGNIFICANT CHANGE UP
BUN SERPL-MCNC: 21 MG/DL — SIGNIFICANT CHANGE UP (ref 7–23)
CA-I SERPL-SCNC: 1.17 MMOL/L — SIGNIFICANT CHANGE UP (ref 1.15–1.33)
CALCIUM SERPL-MCNC: 9.8 MG/DL — SIGNIFICANT CHANGE UP (ref 8.4–10.5)
CHLORIDE BLDV-SCNC: 96 MMOL/L — SIGNIFICANT CHANGE UP (ref 96–108)
CHLORIDE SERPL-SCNC: 95 MMOL/L — LOW (ref 96–108)
CO2 BLDV-SCNC: 34 MMOL/L — HIGH (ref 22–26)
CO2 SERPL-SCNC: 29 MMOL/L — SIGNIFICANT CHANGE UP (ref 22–31)
COLOR SPEC: SIGNIFICANT CHANGE UP
CREAT SERPL-MCNC: 0.91 MG/DL — SIGNIFICANT CHANGE UP (ref 0.5–1.3)
DIFF PNL FLD: NEGATIVE — SIGNIFICANT CHANGE UP
EGFR: 58 ML/MIN/1.73M2 — LOW
EOSINOPHIL # BLD AUTO: 0 K/UL — SIGNIFICANT CHANGE UP (ref 0–0.5)
EOSINOPHIL NFR BLD AUTO: 0 % — SIGNIFICANT CHANGE UP (ref 0–6)
EPI CELLS # UR: 1 /HPF — SIGNIFICANT CHANGE UP
GAS PNL BLDV: 134 MMOL/L — LOW (ref 136–145)
GAS PNL BLDV: SIGNIFICANT CHANGE UP
GAS PNL BLDV: SIGNIFICANT CHANGE UP
GLUCOSE BLDC GLUCOMTR-MCNC: 216 MG/DL — HIGH (ref 70–99)
GLUCOSE BLDC GLUCOMTR-MCNC: 275 MG/DL — HIGH (ref 70–99)
GLUCOSE BLDV-MCNC: 273 MG/DL — HIGH (ref 70–99)
GLUCOSE SERPL-MCNC: 278 MG/DL — HIGH (ref 70–99)
GLUCOSE UR QL: NEGATIVE — SIGNIFICANT CHANGE UP
HCO3 BLDV-SCNC: 32 MMOL/L — HIGH (ref 22–29)
HCT VFR BLD CALC: 36 % — SIGNIFICANT CHANGE UP (ref 34.5–45)
HCT VFR BLDA CALC: 36 % — SIGNIFICANT CHANGE UP (ref 34.5–46.5)
HGB BLD CALC-MCNC: 12 G/DL — SIGNIFICANT CHANGE UP (ref 11.7–16.1)
HGB BLD-MCNC: 11.4 G/DL — LOW (ref 11.5–15.5)
HYALINE CASTS # UR AUTO: 1 /LPF — SIGNIFICANT CHANGE UP (ref 0–2)
IMM GRANULOCYTES NFR BLD AUTO: 0.8 % — SIGNIFICANT CHANGE UP (ref 0–0.9)
INR BLD: 1.75 RATIO — HIGH (ref 0.88–1.16)
KETONES UR-MCNC: SIGNIFICANT CHANGE UP
LACTATE BLDV-MCNC: 2 MMOL/L — SIGNIFICANT CHANGE UP (ref 0.5–2)
LEUKOCYTE ESTERASE UR-ACNC: NEGATIVE — SIGNIFICANT CHANGE UP
LYMPHOCYTES # BLD AUTO: 0.46 K/UL — LOW (ref 1–3.3)
LYMPHOCYTES # BLD AUTO: 3.9 % — LOW (ref 13–44)
MCHC RBC-ENTMCNC: 28.4 PG — SIGNIFICANT CHANGE UP (ref 27–34)
MCHC RBC-ENTMCNC: 31.7 GM/DL — LOW (ref 32–36)
MCV RBC AUTO: 89.8 FL — SIGNIFICANT CHANGE UP (ref 80–100)
MONOCYTES # BLD AUTO: 0.99 K/UL — HIGH (ref 0–0.9)
MONOCYTES NFR BLD AUTO: 8.4 % — SIGNIFICANT CHANGE UP (ref 2–14)
NEUTROPHILS # BLD AUTO: 10.22 K/UL — HIGH (ref 1.8–7.4)
NEUTROPHILS NFR BLD AUTO: 86.7 % — HIGH (ref 43–77)
NITRITE UR-MCNC: NEGATIVE — SIGNIFICANT CHANGE UP
NRBC # BLD: 0 /100 WBCS — SIGNIFICANT CHANGE UP (ref 0–0)
PCO2 BLDV: 49 MMHG — HIGH (ref 39–42)
PH BLDV: 7.43 — SIGNIFICANT CHANGE UP (ref 7.32–7.43)
PH UR: 7 — SIGNIFICANT CHANGE UP (ref 5–8)
PLATELET # BLD AUTO: 180 K/UL — SIGNIFICANT CHANGE UP (ref 150–400)
PO2 BLDV: 52 MMHG — HIGH (ref 25–45)
POTASSIUM BLDV-SCNC: 3.8 MMOL/L — SIGNIFICANT CHANGE UP (ref 3.5–5.1)
POTASSIUM SERPL-MCNC: 3.8 MMOL/L — SIGNIFICANT CHANGE UP (ref 3.5–5.3)
POTASSIUM SERPL-SCNC: 3.8 MMOL/L — SIGNIFICANT CHANGE UP (ref 3.5–5.3)
PROT SERPL-MCNC: 7.3 G/DL — SIGNIFICANT CHANGE UP (ref 6–8.3)
PROT UR-MCNC: ABNORMAL
PROTHROM AB SERPL-ACNC: 20.2 SEC — HIGH (ref 10.5–13.4)
RAPID RVP RESULT: DETECTED
RBC # BLD: 4.01 M/UL — SIGNIFICANT CHANGE UP (ref 3.8–5.2)
RBC # FLD: 13.6 % — SIGNIFICANT CHANGE UP (ref 10.3–14.5)
RBC CASTS # UR COMP ASSIST: 3 /HPF — SIGNIFICANT CHANGE UP (ref 0–4)
SAO2 % BLDV: 85.6 % — SIGNIFICANT CHANGE UP (ref 67–88)
SARS-COV-2 RNA SPEC QL NAA+PROBE: DETECTED
SODIUM SERPL-SCNC: 136 MMOL/L — SIGNIFICANT CHANGE UP (ref 135–145)
SP GR SPEC: 1.02 — SIGNIFICANT CHANGE UP (ref 1.01–1.02)
UROBILINOGEN FLD QL: NEGATIVE — SIGNIFICANT CHANGE UP
WBC # BLD: 11.79 K/UL — HIGH (ref 3.8–10.5)
WBC # FLD AUTO: 11.79 K/UL — HIGH (ref 3.8–10.5)
WBC UR QL: 1 /HPF — SIGNIFICANT CHANGE UP (ref 0–5)

## 2023-01-12 PROCEDURE — 93308 TTE F-UP OR LMTD: CPT | Mod: 26

## 2023-01-12 PROCEDURE — 99291 CRITICAL CARE FIRST HOUR: CPT | Mod: CS

## 2023-01-12 PROCEDURE — 71045 X-RAY EXAM CHEST 1 VIEW: CPT | Mod: 26

## 2023-01-12 RX ORDER — SODIUM CHLORIDE 9 MG/ML
1000 INJECTION, SOLUTION INTRAVENOUS
Refills: 0 | Status: DISCONTINUED | OUTPATIENT
Start: 2023-01-12 | End: 2023-01-18

## 2023-01-12 RX ORDER — REMDESIVIR 5 MG/ML
200 INJECTION INTRAVENOUS EVERY 24 HOURS
Refills: 0 | Status: COMPLETED | OUTPATIENT
Start: 2023-01-12 | End: 2023-01-12

## 2023-01-12 RX ORDER — REMDESIVIR 5 MG/ML
100 INJECTION INTRAVENOUS EVERY 24 HOURS
Refills: 0 | Status: COMPLETED | OUTPATIENT
Start: 2023-01-13 | End: 2023-01-16

## 2023-01-12 RX ORDER — DEXTROSE 50 % IN WATER 50 %
25 SYRINGE (ML) INTRAVENOUS ONCE
Refills: 0 | Status: DISCONTINUED | OUTPATIENT
Start: 2023-01-12 | End: 2023-01-18

## 2023-01-12 RX ORDER — DEXAMETHASONE 0.5 MG/5ML
6 ELIXIR ORAL ONCE
Refills: 0 | Status: COMPLETED | OUTPATIENT
Start: 2023-01-12 | End: 2023-01-12

## 2023-01-12 RX ORDER — DEXTROSE 50 % IN WATER 50 %
15 SYRINGE (ML) INTRAVENOUS ONCE
Refills: 0 | Status: DISCONTINUED | OUTPATIENT
Start: 2023-01-12 | End: 2023-01-18

## 2023-01-12 RX ORDER — RIVAROXABAN 15 MG-20MG
10 KIT ORAL DAILY
Refills: 0 | Status: DISCONTINUED | OUTPATIENT
Start: 2023-01-12 | End: 2023-01-12

## 2023-01-12 RX ORDER — ACETAMINOPHEN 500 MG
975 TABLET ORAL ONCE
Refills: 0 | Status: COMPLETED | OUTPATIENT
Start: 2023-01-12 | End: 2023-01-12

## 2023-01-12 RX ORDER — REMDESIVIR 5 MG/ML
INJECTION INTRAVENOUS
Refills: 0 | Status: COMPLETED | OUTPATIENT
Start: 2023-01-12 | End: 2023-01-16

## 2023-01-12 RX ORDER — ACETAMINOPHEN 500 MG
650 TABLET ORAL EVERY 6 HOURS
Refills: 0 | Status: DISCONTINUED | OUTPATIENT
Start: 2023-01-12 | End: 2023-01-18

## 2023-01-12 RX ORDER — VANCOMYCIN HCL 1 G
1000 VIAL (EA) INTRAVENOUS ONCE
Refills: 0 | Status: COMPLETED | OUTPATIENT
Start: 2023-01-12 | End: 2023-01-12

## 2023-01-12 RX ORDER — APIXABAN 2.5 MG/1
2.5 TABLET, FILM COATED ORAL EVERY 12 HOURS
Refills: 0 | Status: DISCONTINUED | OUTPATIENT
Start: 2023-01-12 | End: 2023-01-18

## 2023-01-12 RX ORDER — CEFEPIME 1 G/1
1000 INJECTION, POWDER, FOR SOLUTION INTRAMUSCULAR; INTRAVENOUS ONCE
Refills: 0 | Status: COMPLETED | OUTPATIENT
Start: 2023-01-12 | End: 2023-01-12

## 2023-01-12 RX ORDER — INSULIN GLARGINE 100 [IU]/ML
10 INJECTION, SOLUTION SUBCUTANEOUS AT BEDTIME
Refills: 0 | Status: DISCONTINUED | OUTPATIENT
Start: 2023-01-12 | End: 2023-01-15

## 2023-01-12 RX ORDER — GLUCAGON INJECTION, SOLUTION 0.5 MG/.1ML
1 INJECTION, SOLUTION SUBCUTANEOUS ONCE
Refills: 0 | Status: DISCONTINUED | OUTPATIENT
Start: 2023-01-12 | End: 2023-01-18

## 2023-01-12 RX ORDER — INSULIN LISPRO 100/ML
VIAL (ML) SUBCUTANEOUS
Refills: 0 | Status: DISCONTINUED | OUTPATIENT
Start: 2023-01-12 | End: 2023-01-18

## 2023-01-12 RX ORDER — SODIUM CHLORIDE 9 MG/ML
500 INJECTION, SOLUTION INTRAVENOUS ONCE
Refills: 0 | Status: COMPLETED | OUTPATIENT
Start: 2023-01-12 | End: 2023-01-12

## 2023-01-12 RX ORDER — ACETAMINOPHEN 500 MG
1000 TABLET ORAL ONCE
Refills: 0 | Status: COMPLETED | OUTPATIENT
Start: 2023-01-12 | End: 2023-01-12

## 2023-01-12 RX ORDER — SODIUM CHLORIDE 9 MG/ML
1000 INJECTION, SOLUTION INTRAVENOUS
Refills: 0 | Status: DISCONTINUED | OUTPATIENT
Start: 2023-01-12 | End: 2023-01-17

## 2023-01-12 RX ORDER — DEXAMETHASONE 0.5 MG/5ML
6 ELIXIR ORAL DAILY
Refills: 0 | Status: DISCONTINUED | OUTPATIENT
Start: 2023-01-13 | End: 2023-01-15

## 2023-01-12 RX ORDER — DEXTROSE 50 % IN WATER 50 %
12.5 SYRINGE (ML) INTRAVENOUS ONCE
Refills: 0 | Status: DISCONTINUED | OUTPATIENT
Start: 2023-01-12 | End: 2023-01-18

## 2023-01-12 RX ADMIN — INSULIN GLARGINE 10 UNIT(S): 100 INJECTION, SOLUTION SUBCUTANEOUS at 22:56

## 2023-01-12 RX ADMIN — Medication 6 MILLIGRAM(S): at 14:48

## 2023-01-12 RX ADMIN — Medication 1000 MILLIGRAM(S): at 12:50

## 2023-01-12 RX ADMIN — CEFEPIME 100 MILLIGRAM(S): 1 INJECTION, POWDER, FOR SOLUTION INTRAMUSCULAR; INTRAVENOUS at 11:19

## 2023-01-12 RX ADMIN — SODIUM CHLORIDE 500 MILLILITER(S): 9 INJECTION, SOLUTION INTRAVENOUS at 10:35

## 2023-01-12 RX ADMIN — CEFEPIME 1000 MILLIGRAM(S): 1 INJECTION, POWDER, FOR SOLUTION INTRAMUSCULAR; INTRAVENOUS at 12:51

## 2023-01-12 RX ADMIN — Medication 400 MILLIGRAM(S): at 10:51

## 2023-01-12 RX ADMIN — REMDESIVIR 200 MILLIGRAM(S): 5 INJECTION INTRAVENOUS at 22:28

## 2023-01-12 RX ADMIN — SODIUM CHLORIDE 60 MILLILITER(S): 9 INJECTION, SOLUTION INTRAVENOUS at 15:33

## 2023-01-12 RX ADMIN — SODIUM CHLORIDE 500 MILLILITER(S): 9 INJECTION, SOLUTION INTRAVENOUS at 12:51

## 2023-01-12 RX ADMIN — Medication 250 MILLIGRAM(S): at 12:24

## 2023-01-12 NOTE — H&P ADULT - NSHPPHYSICALEXAM_GEN_ALL_CORE
T(C): 38.2 (01-12-23 @ 12:50), Max: 38.7 (01-12-23 @ 10:25)  HR: 101 (01-12-23 @ 14:51) (99 - 132)  BP: 113/73 (01-12-23 @ 14:51) (85/60 - 128/90)  RR: 24 (01-12-23 @ 14:51) (18 - 26)  SpO2: 100% (01-12-23 @ 14:51) (94% - 100%)    PHYSICAL EXAM:  GENERAL: NAD, well-developed  HEAD:  Atraumatic, Normocephalic  EYES: EOMI, PERRLA, conjunctiva and sclera clear  NECK: Supple, No JVD  CHEST/LUNG: Clear to auscultation bilaterally; No wheeze  HEART: Regular rate and rhythm; No murmurs, rubs, or gallops  ABDOMEN: Soft, Nontender, Nondistended; Bowel sounds present  EXTREMITIES:  2+ Peripheral Pulses, No clubbing, cyanosis, or edema  PSYCH: AAOx3  NEUROLOGY: non-focal  SKIN: No rashes or lesions

## 2023-01-12 NOTE — ED ADULT NURSE NOTE - OBJECTIVE STATEMENT
pt presented to ed with c/o fever, body aches, hypoxia that started yesterday as per EMS. Unable to obtain any information from pt at this time. pt does not follow commands or answer questions. Will continue to monitor.

## 2023-01-12 NOTE — ED PROVIDER NOTE - CARE PLAN
Principal Discharge DX:	Sepsis, unspecified organism  Secondary Diagnosis:	Pneumonia  Secondary Diagnosis:	Atrial fibrillation   1

## 2023-01-12 NOTE — ED PROVIDER NOTE - ATTENDING CONTRIBUTION TO CARE
I, Gurpreet Harris, performed a history and physical exam of the patient and discussed their management with the advanced care provider. I reviewed the advanced care provider's note and agree with the documented findings and plan of care except where noted. I was present and available for all procedures.     I, Gurpreet Harris, performed a history and physical exam of the patient and discussed their management with the resident and/or advanced care provider. I reviewed the resident and/or advanced care provider's note and agree with the documented findings and plan of care except where noted. I was present and available for all procedures.     89 yo F PMHx CAD, aortic stenosis, AFib on eliquis, HLD, ?atrial thrombus, coming from home, presents to ED via EMS for SOB, body aches, myalgias x1 day. Pt lives at home with a 24/7 HHA who called 911. Per EMS, pt was found to be hypoxeic to 90% on RA and in AFib w/ RVR. History limited as pt is hard of hearing and we attempted to use ipad translater for Nepalese and pt was unable to hear pt.     ROS limited as pt is very hard of hearing    PHYSICAL EXAM:  GENERAL: non-toxic appearing; in no respiratory distress  HEAD Atraumatic, Normocephalic  NECK: No JVD; trachea midline  EYES: PERRL, EOMs intact b/l w/out deficits; normal conjunctiva  CHEST/LUNG: coarse Rhonchi LLL  HEART: tachycardic; irregularly irregular;  ABDOMEN: soft, NT, ND  EXTREMITIES: No LE edema, +2 radial pulses b/l, +2 DP/PT pulses b/l  MUSCULOSKELETAL: FROM of all 4 extremities;   NERVOUS SYSTEM:  A&Ox3, No motor deficits or sensory deficits; CNII-XII intact; Speech is fluent and appropriate  SKIN:  Warm and dry as visualized     MDM: pt presenting with fevers, myalgias, SOB, found to be hypoxic to 90s on RA per EMS, concerning for sepsis 2/2 possible PNA. no reported hx of aspiration/vomiting. pt has severe AS and thus, is not eligible for a 30cc/kg ivf bolus. pt is talking to us and will respond to questioning if speaking loud enough. pt likely cannot tolerate much iv fluids. will perform POCUS to evaluate. will start broad spectrum abx. low threshold to start PPV if pt's wob increases. will draw labs including cbc, cmp, pt, vbg, bcx x2, ua, ucx, cxr. will need admission. will need to clarify w/ family GOC. pt critically ill. low threshold to start vasopressors.

## 2023-01-12 NOTE — H&P ADULT - HISTORY OF PRESENT ILLNESS
89 yo female with PMHx of CAD, DM, HLD, GERD, atrial thrombus , Afib on Eliquis, CHF, AS p/w fever.  Patient unable to provide history.  Per EMS patient has had fevers, body aches and shortness of breath since yesterday.  Patient lives at home alone with her home health aide.  Upon arrival, EMS states patient was sating at 90% on room air with improvement on 2 L nasal cannula. Ptn was admitted with a similar presentation nearly a year ago and was covid pos then as she is now as well

## 2023-01-12 NOTE — H&P ADULT - ASSESSMENT
89 yo female with PMHx of CAD, DM, HLD, GERD, atrial thrombus , Afib on Eliquis, CHF, AS p/w fever.  Patient unable to provide history.  Per EMS patient has had fevers, body aches and shortness of breath since yesterday.  Patient lives at home alone with her home health aide.  Upon arrival, EMS states patient was sating at 90% on room air with improvement on 2 L nasal cannula. Ptn was admitted with a similar presentation nearly a year ago and was covid pos then as she is now as well. She is DNR/DNI as per her HCP  Will treat w remdesivir, dexamethasone, insulin on a sliding scale, IVF, ID, pulm consults called. dvt ppx w po Xarelto

## 2023-01-12 NOTE — ED PROVIDER NOTE - OBJECTIVE STATEMENT
89 yo female with PMHx of CAD, DM, HLD, GERD, atrial thrombus , Afib on Eliquis, CHF, AS p/w fever.  Patient unable to provide history.  Per EMS patient has had fevers, body aches and shortness of breath since yesterday.  Patient lives at home alone with her home health aide.  Upon arrival, EMS states patient was sating at 90% on room air with improvement on 2 L nasal cannula.

## 2023-01-12 NOTE — ED PROVIDER NOTE - NS ED MD DISPO ISOLATION TYPES
[facility]aling Center Instructions    MEDICATIONS     Medication Note  Continue present medications as prescribed by the [facility]aling [facility] or other physicians you see. To avoid any problems keep the [facility] informed each visit of any medications changes that occur.     WOUND CARE     Clean Wound with: Soap and Water   Treatment 1   Location: right leg  Dressing: Apply Medihoney to wound, cover with Aquacel, gauze, and matthew.  Wear tubigrip stocking.  Dressing Care Frequency: Daily  Care Provider: Family and Visiting Nurse     Visiting nurse-please order supplies for patient.  Please take oral antibiotics as prescribed.    COMPRESSION THERAPY     Tubigrip Stockings  Right Leg size D  • Apply Tubigrips (cotton/compression stockinette) DAILY.  • Apply stockings upon arising in the morning to obtain the best results.  • Remove stockings at bedtime once your legs are elevated.  • Do not allow the stockinette to roll down as it can reduce or interrupt the blood flow in your limb.  • Always wear the appropriate size that is recommended for you at the [facility].  • Tubigrips can be washed by hand with soap and water and hang to dry overnight.       Basic Principles      • Wash your hands thoroughly with soap and water and after each dressing change. If someone other than the patient changes the dressing, it’s best to wear disposable gloves.   • Do not get the wound or dressing wet.   • To shower: remove the dressing, shower with soap and water (including washing the wound - do not use a washcloth), air dry, then redress the wound.  • Do not take a tub bath  • Keep all your dressings in a clean, covered container at home to avoid dust and contamination.  • Discard used dressings in a plastic bag or covered trash container.  • Check your wound and the surrounding skin at each dressing change for redness, warmth, swelling, increased pain, foul odor, fever, pus or abnormal drainage or discharge.  • Notify Wound Healing  Center if any of these changes occur - [phone].        Nutrition  • Eat a well, balanced diet with adequate protein to support wound healing.   • Take a multivitamin every day. Adequate nutrition supports healing and new tissue growth.  • All diabetic patients should strive to keep blood sugars within a normal, practical range.   • Elevated blood sugars can delay your wound healing.        ACTIVITY     Elevate legs above level of heart   Elevate your legs above the level of your heart for specific time periods during the day on several firm pillows or a foam leg wedge  Use of a recliner chair at home can often help in the appropriate elevation of your legs above the level of your heart  Normal activity then rest and elevation  May shower  May walk    MOBILITY     Walker     Airborne/Contact

## 2023-01-12 NOTE — CONSULT NOTE ADULT - ASSESSMENT
89 yo female with PMHx of CAD, DM, HLD, GERD, atrial thrombus , Afib on Eliquis, CHF, AS p/w fever.  Patient unable to provide history.  Per EMS patient has had fevers, body aches and shortness of breath since yesterday.  Patient lives at home alone with her home health aide.  Upon arrival, EMS states patient was sating at 90% on room air with improvement on 2 L nasal cannula. Ptn was admitted with a similar presentation nearly a year ago and was covid pos then as she is now as well.

## 2023-01-12 NOTE — ED PROVIDER NOTE - PHYSICAL EXAMINATION
Gen: Awake and alert, following commands, but not answering questions.  Skin: No rashes or lesions  HEENT: NC/AT, PERRLA, EOMI, MMM  Resp: mild tachypnea, poor inspiratory effort, +left sided crackles  Cardiac: tachycardia s1s2, +murmur  GI: ND, +BS, Soft, NT  Ext: non pitting pedal edema BL  Neuro: moving all extremities

## 2023-01-12 NOTE — CONSULT NOTE ADULT - ASSESSMENT
89 y/o F PMHx CAD, DM, HLD, GERD, atrial thrombus, Afib on Eliquis, CHF, AS who presented with fever, SOB.     sepsis 2/2 COVID, acute hypoxic resp failure  SARS-CoV-2 PCR positive  CXR b/l hazy opacities  start remdesivir x 5 day course  monitor liver enzymes while on remdesivir  c/w decadron  trend inflammatory markers  monitor SpO2, c/w supplemental O2 as needed, wean as tolerated  supportive care  trend temps, wbc  isolation per infection control policy     Varinder Mckenzie M.D.  [facility], Division of Infectious Diseases  [phone]  After 5pm on weekdays and all day on weekends - please call [phone]  89 y/o F PMHx CAD, DM, HLD, GERD, atrial thrombus, Afib on Eliquis, CHF, AS who presented with fever, SOB.     sepsis 2/2 COVID, acute hypoxic resp failure  SARS-CoV-2 PCR positive  CXR b/l hazy opacities  UA negative  start remdesivir x 5 day course  monitor liver enzymes while on remdesivir  c/w decadron  f/u blood cultures  trend inflammatory markers  monitor SpO2, c/w supplemental O2 as needed, wean as tolerated  supportive care  trend temps, wbc  isolation per infection control policy     Varinder Mckenzie M.D.  [facility], Division of Infectious Diseases  [phone]  After 5pm on weekdays and all day on weekends - please call [phone]

## 2023-01-12 NOTE — ED PROVIDER NOTE - PROGRESS NOTE DETAILS
Gurpreet Harris MD. pt becoming more hypoxemic and with mildly worsening increased WOB. called for bipap to help with diuresis. bedside echo showing VeXUS of 3; pt cannot tolerate more iv fluids. attempted to reach family to discuss GOC; left message. awiating call back.

## 2023-01-13 LAB
A1C WITH ESTIMATED AVERAGE GLUCOSE RESULT: 6.6 % — HIGH (ref 4–5.6)
ALBUMIN SERPL ELPH-MCNC: 3.5 G/DL — SIGNIFICANT CHANGE UP (ref 3.3–5)
ALP SERPL-CCNC: 61 U/L — SIGNIFICANT CHANGE UP (ref 40–120)
ALT FLD-CCNC: 11 U/L — SIGNIFICANT CHANGE UP (ref 10–45)
ANION GAP SERPL CALC-SCNC: 13 MMOL/L — SIGNIFICANT CHANGE UP (ref 5–17)
AST SERPL-CCNC: 20 U/L — SIGNIFICANT CHANGE UP (ref 10–40)
BASE EXCESS BLDA CALC-SCNC: 3.9 MMOL/L — HIGH (ref -2–3)
BILIRUB DIRECT SERPL-MCNC: 0.2 MG/DL — SIGNIFICANT CHANGE UP (ref 0–0.3)
BILIRUB INDIRECT FLD-MCNC: 0.6 MG/DL — SIGNIFICANT CHANGE UP (ref 0.2–1)
BILIRUB SERPL-MCNC: 0.8 MG/DL — SIGNIFICANT CHANGE UP (ref 0.2–1.2)
BUN SERPL-MCNC: 23 MG/DL — SIGNIFICANT CHANGE UP (ref 7–23)
CALCIUM SERPL-MCNC: 9.7 MG/DL — SIGNIFICANT CHANGE UP (ref 8.4–10.5)
CHLORIDE SERPL-SCNC: 97 MMOL/L — SIGNIFICANT CHANGE UP (ref 96–108)
CO2 BLDA-SCNC: 29 MMOL/L — HIGH (ref 19–24)
CO2 SERPL-SCNC: 26 MMOL/L — SIGNIFICANT CHANGE UP (ref 22–31)
CREAT SERPL-MCNC: 0.79 MG/DL — SIGNIFICANT CHANGE UP (ref 0.5–1.3)
CRP SERPL-MCNC: 110 MG/L — HIGH (ref 0–4)
CULTURE RESULTS: NO GROWTH — SIGNIFICANT CHANGE UP
D DIMER BLD IA.RAPID-MCNC: 395 NG/ML DDU — HIGH
EGFR: 69 ML/MIN/1.73M2 — SIGNIFICANT CHANGE UP
ESTIMATED AVERAGE GLUCOSE: 143 MG/DL — HIGH (ref 68–114)
FIBRINOGEN PPP-MCNC: 489 MG/DL — HIGH (ref 200–445)
GLUCOSE BLDC GLUCOMTR-MCNC: 197 MG/DL — HIGH (ref 70–99)
GLUCOSE BLDC GLUCOMTR-MCNC: 206 MG/DL — HIGH (ref 70–99)
GLUCOSE BLDC GLUCOMTR-MCNC: 218 MG/DL — HIGH (ref 70–99)
GLUCOSE BLDC GLUCOMTR-MCNC: 336 MG/DL — HIGH (ref 70–99)
GLUCOSE SERPL-MCNC: 240 MG/DL — HIGH (ref 70–99)
HCO3 BLDA-SCNC: 28 MMOL/L — SIGNIFICANT CHANGE UP (ref 21–28)
HCT VFR BLD CALC: 33.8 % — LOW (ref 34.5–45)
HGB BLD-MCNC: 10.8 G/DL — LOW (ref 11.5–15.5)
IGA FLD-MCNC: 150 MG/DL — SIGNIFICANT CHANGE UP (ref 84–499)
IGG FLD-MCNC: 1307 MG/DL — SIGNIFICANT CHANGE UP (ref 610–1660)
IGM SERPL-MCNC: 94 MG/DL — SIGNIFICANT CHANGE UP (ref 35–242)
KAPPA LC SER QL IFE: 5.62 MG/DL — HIGH (ref 0.33–1.94)
KAPPA/LAMBDA FREE LIGHT CHAIN RATIO, SERUM: 2.39 RATIO — HIGH (ref 0.26–1.65)
LAMBDA LC SER QL IFE: 2.35 MG/DL — SIGNIFICANT CHANGE UP (ref 0.57–2.63)
LDH SERPL L TO P-CCNC: 172 U/L — SIGNIFICANT CHANGE UP (ref 50–242)
MCHC RBC-ENTMCNC: 29 PG — SIGNIFICANT CHANGE UP (ref 27–34)
MCHC RBC-ENTMCNC: 32 GM/DL — SIGNIFICANT CHANGE UP (ref 32–36)
MCV RBC AUTO: 90.6 FL — SIGNIFICANT CHANGE UP (ref 80–100)
MRSA PCR RESULT.: SIGNIFICANT CHANGE UP
NRBC # BLD: 0 /100 WBCS — SIGNIFICANT CHANGE UP (ref 0–0)
NT-PROBNP SERPL-SCNC: HIGH PG/ML (ref 0–300)
PCO2 BLDA: 38 MMHG — SIGNIFICANT CHANGE UP (ref 32–45)
PH BLDA: 7.47 — HIGH (ref 7.35–7.45)
PLATELET # BLD AUTO: 169 K/UL — SIGNIFICANT CHANGE UP (ref 150–400)
PO2 BLDA: 219 MMHG — HIGH (ref 83–108)
POTASSIUM SERPL-MCNC: 4.3 MMOL/L — SIGNIFICANT CHANGE UP (ref 3.5–5.3)
POTASSIUM SERPL-SCNC: 4.3 MMOL/L — SIGNIFICANT CHANGE UP (ref 3.5–5.3)
PROCALCITONIN SERPL-MCNC: 6.13 NG/ML — HIGH (ref 0.02–0.1)
PROT SERPL-MCNC: 6.9 G/DL — SIGNIFICANT CHANGE UP (ref 6–8.3)
RBC # BLD: 3.73 M/UL — LOW (ref 3.8–5.2)
RBC # FLD: 13.6 % — SIGNIFICANT CHANGE UP (ref 10.3–14.5)
S AUREUS DNA NOSE QL NAA+PROBE: SIGNIFICANT CHANGE UP
SAO2 % BLDA: 99.9 % — HIGH (ref 94–98)
SODIUM SERPL-SCNC: 136 MMOL/L — SIGNIFICANT CHANGE UP (ref 135–145)
SPECIMEN SOURCE: SIGNIFICANT CHANGE UP
TRIGL SERPL-MCNC: 73 MG/DL — SIGNIFICANT CHANGE UP
WBC # BLD: 8.6 K/UL — SIGNIFICANT CHANGE UP (ref 3.8–10.5)
WBC # FLD AUTO: 8.6 K/UL — SIGNIFICANT CHANGE UP (ref 3.8–10.5)

## 2023-01-13 RX ORDER — PIPERACILLIN AND TAZOBACTAM 4; .5 G/20ML; G/20ML
3.38 INJECTION, POWDER, LYOPHILIZED, FOR SOLUTION INTRAVENOUS EVERY 8 HOURS
Refills: 0 | Status: DISCONTINUED | OUTPATIENT
Start: 2023-01-14 | End: 2023-01-14

## 2023-01-13 RX ORDER — PIPERACILLIN AND TAZOBACTAM 4; .5 G/20ML; G/20ML
3.38 INJECTION, POWDER, LYOPHILIZED, FOR SOLUTION INTRAVENOUS ONCE
Refills: 0 | Status: COMPLETED | OUTPATIENT
Start: 2023-01-14 | End: 2023-01-14

## 2023-01-13 RX ORDER — PIPERACILLIN AND TAZOBACTAM 4; .5 G/20ML; G/20ML
3.38 INJECTION, POWDER, LYOPHILIZED, FOR SOLUTION INTRAVENOUS ONCE
Refills: 0 | Status: COMPLETED | OUTPATIENT
Start: 2023-01-13 | End: 2023-01-13

## 2023-01-13 RX ORDER — CHLORHEXIDINE GLUCONATE 213 G/1000ML
1 SOLUTION TOPICAL
Refills: 0 | Status: DISCONTINUED | OUTPATIENT
Start: 2023-01-13 | End: 2023-01-18

## 2023-01-13 RX ORDER — INSULIN LISPRO 100/ML
VIAL (ML) SUBCUTANEOUS AT BEDTIME
Refills: 0 | Status: DISCONTINUED | OUTPATIENT
Start: 2023-01-13 | End: 2023-01-18

## 2023-01-13 RX ADMIN — PIPERACILLIN AND TAZOBACTAM 25 GRAM(S): 4; .5 INJECTION, POWDER, LYOPHILIZED, FOR SOLUTION INTRAVENOUS at 22:21

## 2023-01-13 RX ADMIN — Medication 1: at 07:46

## 2023-01-13 RX ADMIN — Medication 6 MILLIGRAM(S): at 05:24

## 2023-01-13 RX ADMIN — INSULIN GLARGINE 10 UNIT(S): 100 INJECTION, SOLUTION SUBCUTANEOUS at 21:45

## 2023-01-13 RX ADMIN — APIXABAN 2.5 MILLIGRAM(S): 2.5 TABLET, FILM COATED ORAL at 17:00

## 2023-01-13 RX ADMIN — Medication 2: at 16:28

## 2023-01-13 RX ADMIN — CHLORHEXIDINE GLUCONATE 1 APPLICATION(S): 213 SOLUTION TOPICAL at 17:00

## 2023-01-13 RX ADMIN — Medication 2: at 11:54

## 2023-01-13 RX ADMIN — SODIUM CHLORIDE 60 MILLILITER(S): 9 INJECTION, SOLUTION INTRAVENOUS at 11:54

## 2023-01-13 RX ADMIN — APIXABAN 2.5 MILLIGRAM(S): 2.5 TABLET, FILM COATED ORAL at 05:24

## 2023-01-13 RX ADMIN — Medication 2: at 22:22

## 2023-01-13 RX ADMIN — SODIUM CHLORIDE 60 MILLILITER(S): 9 INJECTION, SOLUTION INTRAVENOUS at 16:27

## 2023-01-13 RX ADMIN — PIPERACILLIN AND TAZOBACTAM 200 GRAM(S): 4; .5 INJECTION, POWDER, LYOPHILIZED, FOR SOLUTION INTRAVENOUS at 16:27

## 2023-01-13 RX ADMIN — Medication 200 MILLIGRAM(S): at 18:54

## 2023-01-13 RX ADMIN — Medication 100 MILLIGRAM(S): at 22:22

## 2023-01-13 NOTE — CONSULT NOTE ADULT - ASSESSMENT
89 yo female with PMHx of CAD, DM, HLD, GERD, atrial thrombus , Afib on Eliquis, CHF, AS p/w fever.  Patient unable to provide history.  Per EMS patient has had fevers, body aches and shortness of breath since yesterday.  Patient lives at home alone with her home health aide.  Upon arrival, EMS states patient was sating at 90% on room air with improvement on 2 L nasal cannula. Ptn was admitted with a similar presentation nearly a year ago and was covid pos then as she is now as well. She is DNR/DNI as per her HCP  Will treat w remdesivir, dexamethasone, insulin on a sliding scale, IVF, ID, pulm consults called. dvt ppx w po Xarelto;    Covid infection: / resp failure   A fib  :  CHF:  Aortic stenosis:   DM:   GERD:   cad     1/13/2023      Covid infection: / resp failure  : ON REMDESIVIR AND DEXA:  SHE HAD COVID TWO YEARS AGO ALSO : ON BIPAP:  She is on empirically for bacterial infection:   A fib  : on eliquis  CHF:  per primary team : she has heart failure:  her legs were very swollen before two weeks ago per her neice  Aortic stenosis: has sever AS:  didb not do any treatment / tavr:    DM:  monitor and control   GERD: PPI  cad  : cont current meds::  on eliquis    dvt propahylaxis

## 2023-01-13 NOTE — PROGRESS NOTE ADULT - SUBJECTIVE AND OBJECTIVE BOX
Patient is a 89y old  Female who presents with a chief complaint of hypoxic resp failure (2023 17:35)      SUBJECTIVE / OVERNIGHT EVENTS: ptn is dyspneic, has a lot of secretions, remains on BIPAP, seen by PULM, ID, Card, on chroninc AC, DDImer >11K, Procal >1, on steroids, remdesivir, day 2 hospitalization for acute hypoxic respiratory failure 2/2 COvid PNA. ptn is DNR/DNI as per HCP    MEDICATIONS  (STANDING):  apixaban 2.5 milliGRAM(s) Oral every 12 hours  dexAMETHasone  Injectable 6 milliGRAM(s) IV Push daily  dextrose 5%. 1000 milliLiter(s) (50 mL/Hr) IV Continuous <Continuous>  dextrose 5%. 1000 milliLiter(s) (100 mL/Hr) IV Continuous <Continuous>  dextrose 50% Injectable 25 Gram(s) IV Push once  dextrose 50% Injectable 12.5 Gram(s) IV Push once  dextrose 50% Injectable 25 Gram(s) IV Push once  glucagon  Injectable 1 milliGRAM(s) IntraMuscular once  insulin glargine Injectable (LANTUS) 10 Unit(s) SubCutaneous at bedtime  insulin lispro (ADMELOG) corrective regimen sliding scale   SubCutaneous three times a day before meals  piperacillin/tazobactam IVPB. 3.375 Gram(s) IV Intermittent once  piperacillin/tazobactam IVPB.- 3.375 Gram(s) IV Intermittent once  remdesivir  IVPB 100 milliGRAM(s) IV Intermittent every 24 hours  remdesivir  IVPB   IV Intermittent   sodium chloride 0.45%. 1000 milliLiter(s) (60 mL/Hr) IV Continuous <Continuous>    MEDICATIONS  (PRN):  acetaminophen     Tablet .. 650 milliGRAM(s) Oral every 6 hours PRN Temp greater or equal to 38C (100.4F), Mild Pain (1 - 3)  dextrose Oral Gel 15 Gram(s) Oral once PRN Blood Glucose LESS THAN 70 milliGRAM(s)/deciliter      Vital Signs Last 24 Hrs  T(F): 98.5 (23 @ 05:23), Max: 98.6 (23 @ 00:22)  HR: 89 (23 @ 09:30) (65 - 100)  BP: 109/65 (23 @ 08:00) (96/70 - 118/75)  RR: 18 (23 @ 08:00) (17 - 24)  SpO2: 100% (23 @ 09:30) (92% - 100%)  Telemetry:   CAPILLARY BLOOD GLUCOSE      POCT Blood Glucose.: 218 mg/dL (2023 11:12)  POCT Blood Glucose.: 197 mg/dL (2023 07:27)  POCT Blood Glucose.: 275 mg/dL (2023 22:55)  POCT Blood Glucose.: 216 mg/dL (2023 18:05)    I&O's Summary      PHYSICAL EXAM:  GENERAL: NAD, well-developed  HEAD:  Atraumatic, Normocephalic  EYES: EOMI, PERRLA, conjunctiva and sclera clear  NECK: Supple, No JVD  CHEST/LUNG: Clear to auscultation bilaterally; No wheeze  HEART: Regular rate and rhythm; No murmurs, rubs, or gallops  ABDOMEN: Soft, Nontender, Nondistended; Bowel sounds present  EXTREMITIES:  2+ Peripheral Pulses, No clubbing, cyanosis, or edema  PSYCH: AAOx3  NEUROLOGY: non-focal  SKIN: No rashes or lesions    LABS:                        10.8   8.60  )-----------( 169      ( 2023 11:35 )             33.8         136  |  97  |  23  ----------------------------<  240<H>  4.3   |  26  |  0.79    Ca    9.7      2023 11:35    TPro  6.9  /  Alb  3.5  /  TBili  0.8  /  DBili  0.2  /  AST  20  /  ALT  11  /  AlkPhos  61  -    PT/INR - ( 2023 10:21 )   PT: 20.2 sec;   INR: 1.75 ratio         PTT - ( 2023 10:21 )  PTT:35.0 sec      Urinalysis Basic - ( 2023 10:32 )    Color: Light Yellow / Appearance: Clear / S.017 / pH: x  Gluc: x / Ketone: Trace  / Bili: Negative / Urobili: Negative   Blood: x / Protein: 30 mg/dL / Nitrite: Negative   Leuk Esterase: Negative / RBC: 3 /hpf / WBC 1 /HPF   Sq Epi: x / Non Sq Epi: 1 /hpf / Bacteria: Negative        RADIOLOGY & ADDITIONAL TESTS:    Imaging Personally Reviewed:    Consultant(s) Notes Reviewed:      Care Discussed with Consultants/Other Providers:

## 2023-01-13 NOTE — PATIENT PROFILE ADULT - FUNCTIONAL ASSESSMENT - BASIC MOBILITY 6.
3-calculated by average/Not able to assess (calculate score using [facility] averaging method)

## 2023-01-13 NOTE — PROGRESS NOTE ADULT - SUBJECTIVE AND OBJECTIVE BOX
[facility], Division of Infectious Diseases  ALINA Mcneil Y. Patel, S. Shah, G. Jonah  [phone]  ([phone] - weekdays after 5pm and weekends)    Name: SONAY MYERS  Age/Gender: 89y Female  MRN: [id number]    Interval History:  Patient seen this morning.   Notes reviewed.   No concerning overnight events.  Afebrile.   more alert and interactive this AM    Allergies: No Known Allergies      Objective:  Vitals:   T(F): 98.5 (23 @ 05:23), Max: 98.6 (23 @ 00:22)  HR: 89 (23 @ 09:30) (65 - 100)  BP: 109/65 (23 @ 08:00) (96/70 - 118/75)  RR: 18 (23 @ 08:00) (17 - 24)  SpO2: 100% (23 @ 09:30) (92% - 100%)  Physical Examination:  General: ill appearing  HEENT: anicteric  Cardio: normal rate  Resp: decreased breath sounds  Abd: soft, ND  Ext: trace LE edema  Skin: warm, dry    Laboratory Studies:  CBC:                       10.8   8.60  )-----------( 169      ( 2023 11:35 )             33.8     WBC Trend:  8.60 23 @ 11:35  11.79 23 @ 10:21    CMP:     136  |  97  |  23  ----------------------------<  240<H>  4.3   |  26  |  0.79    Ca    9.7      2023 11:35    TPro  6.9  /  Alb  3.5  /  TBili  0.8  /  DBili  0.2  /  AST  20  /  ALT  11  /  AlkPhos  61        LIVER FUNCTIONS - ( 2023 11:35 )  Alb: 3.5 g/dL / Pro: 6.9 g/dL / ALK PHOS: 61 U/L / ALT: 11 U/L / AST: 20 U/L / GGT: x             Urinalysis Basic - ( 2023 10:32 )    Color: Light Yellow / Appearance: Clear / S.017 / pH: x  Gluc: x / Ketone: Trace  / Bili: Negative / Urobili: Negative   Blood: x / Protein: 30 mg/dL / Nitrite: Negative   Leuk Esterase: Negative / RBC: 3 /hpf / WBC 1 /HPF   Sq Epi: x / Non Sq Epi: 1 /hpf / Bacteria: Negative      Microbiology: reviewed     Culture - Urine (collected 23 @ 10:32)  Source: Clean Catch Clean Catch (Midstream)  Final Report (23 @ 11:01):    No growth    Culture - Blood (collected 23 @ 09:50)  Source: .Blood Blood-Peripheral  Preliminary Report (23 @ 15:02):    No growth to date.    Culture - Blood (collected 23 @ 09:35)  Source: .Blood Blood-Peripheral  Preliminary Report (23 @ 15:02):    No growth to date.        Radiology: reviewed     Medications:  acetaminophen     Tablet .. 650 milliGRAM(s) Oral every 6 hours PRN  apixaban 2.5 milliGRAM(s) Oral every 12 hours  dexAMETHasone  Injectable 6 milliGRAM(s) IV Push daily  dextrose 5%. 1000 milliLiter(s) IV Continuous <Continuous>  dextrose 5%. 1000 milliLiter(s) IV Continuous <Continuous>  dextrose 50% Injectable 25 Gram(s) IV Push once  dextrose 50% Injectable 12.5 Gram(s) IV Push once  dextrose 50% Injectable 25 Gram(s) IV Push once  dextrose Oral Gel 15 Gram(s) Oral once PRN  glucagon  Injectable 1 milliGRAM(s) IntraMuscular once  insulin glargine Injectable (LANTUS) 10 Unit(s) SubCutaneous at bedtime  insulin lispro (ADMELOG) corrective regimen sliding scale   SubCutaneous three times a day before meals  piperacillin/tazobactam IVPB. 3.375 Gram(s) IV Intermittent once  piperacillin/tazobactam IVPB.- 3.375 Gram(s) IV Intermittent once  remdesivir  IVPB 100 milliGRAM(s) IV Intermittent every 24 hours  remdesivir  IVPB   IV Intermittent   sodium chloride 0.45%. 1000 milliLiter(s) IV Continuous <Continuous>    Antimicrobials:  piperacillin/tazobactam IVPB. 3.375 Gram(s) IV Intermittent once  piperacillin/tazobactam IVPB.- 3.375 Gram(s) IV Intermittent once  remdesivir  IVPB 100 milliGRAM(s) IV Intermittent every 24 hours  remdesivir  IVPB   IV Intermittent

## 2023-01-13 NOTE — PROGRESS NOTE ADULT - ASSESSMENT
89 y/o F PMHx CAD, DM, HLD, GERD, atrial thrombus, Afib on Eliquis, CHF, AS who presented with fever, SOB.     sepsis 2/2 COVID, acute hypoxic resp failure  SARS-CoV-2 PCR positive  CXR b/l hazy opacities  UA negative  c/w remdesivir x 5 day course  monitor liver enzymes while on remdesivir  c/w decadron x 10 days  f/u blood cultures  trend inflammatory markers  monitor SpO2, c/w supplemental O2 as needed, wean as tolerated  agree with starting zosyn for possible superimposed bacterial PNA  supportive care  trend temps, wbc  isolation per infection control policy     Varinder Mckenzie M.D.  [facility], Division of Infectious Diseases  [phone]  After 5pm on weekdays and all day on weekends - please call [phone]  89 y/o F PMHx CAD, DM, HLD, GERD, atrial thrombus, Afib on Eliquis, CHF, AS who presented with fever, SOB.     sepsis 2/2 COVID, acute hypoxic resp failure  SARS-CoV-2 PCR positive  CXR b/l hazy opacities  UA negative  c/w remdesivir x 5 day course  monitor liver enzymes while on remdesivir  c/w decadron x 10 days  f/u blood cultures  trend inflammatory markers  monitor SpO2, c/w supplemental O2 as needed, wean as tolerated  agree with starting zosyn for possible superimposed bacterial PNA  supportive care  trend temps, wbc  isolation per infection control policy   [facility] discussion    Varinder Mckenzie M.D.  [facility], Division of Infectious Diseases  [phone]  After 5pm on weekdays and all day on weekends - please call [phone]  89 y/o F PMHx CAD, DM, HLD, GERD, atrial thrombus, Afib on Eliquis, CHF, AS who presented with fever, SOB.     sepsis 2/2 COVID, acute hypoxic resp failure  SARS-CoV-2 PCR positive  CXR b/l hazy opacities  UA negative  c/w remdesivir x 5 day course  monitor liver enzymes while on remdesivir  c/w decadron x 10 days  f/u blood cultures  trend inflammatory markers  monitor SpO2, c/w supplemental O2 as needed, wean as tolerated  procal elevated  agree with starting zosyn for possible superimposed bacterial PNA  supportive care  trend temps, wbc  isolation per infection control policy   [facility] discussion    Varinder Mckenzie M.D.  [facility], Division of Infectious Diseases  [phone]  After 5pm on weekdays and all day on weekends - please call [phone]  89 y/o F PMHx CAD, DM, HLD, GERD, atrial thrombus, Afib on Eliquis, CHF, AS who presented with fever, SOB.     sepsis 2/2 COVID, acute hypoxic resp failure  SARS-CoV-2 PCR positive  CXR b/l hazy opacities  UA negative  c/w remdesivir x 5 day course  monitor liver enzymes while on remdesivir  c/w decadron x 10 days  f/u blood cultures  trend inflammatory markers  monitor SpO2, c/w supplemental O2 as needed, wean as tolerated  procal elevated  agree with starting zosyn for possible superimposed bacterial PNA  supportive care  trend temps, wbc  isolation per infection control policy   [facility] discussion    Dr. Carlee Lopez will be covering 1/14-1/15     Varinder Mckenzie M.D.  [facility], Division of Infectious Diseases  [phone]  After 5pm on weekdays and all day on weekends - please call [phone]

## 2023-01-13 NOTE — PROGRESS NOTE ADULT - SUBJECTIVE AND OBJECTIVE BOX
Subjective: Patient seen and examined. No new events except as noted.   sleepy   remains on BIPAP  REVIEW OF SYSTEMS:    Unable to obtain     MEDICATIONS:  MEDICATIONS  (STANDING):  apixaban 2.5 milliGRAM(s) Oral every 12 hours  dexAMETHasone  Injectable 6 milliGRAM(s) IV Push daily  dextrose 5%. 1000 milliLiter(s) (50 mL/Hr) IV Continuous <Continuous>  dextrose 5%. 1000 milliLiter(s) (100 mL/Hr) IV Continuous <Continuous>  dextrose 50% Injectable 25 Gram(s) IV Push once  dextrose 50% Injectable 12.5 Gram(s) IV Push once  dextrose 50% Injectable 25 Gram(s) IV Push once  glucagon  Injectable 1 milliGRAM(s) IntraMuscular once  insulin glargine Injectable (LANTUS) 10 Unit(s) SubCutaneous at bedtime  insulin lispro (ADMELOG) corrective regimen sliding scale   SubCutaneous three times a day before meals  remdesivir  IVPB   IV Intermittent   remdesivir  IVPB 100 milliGRAM(s) IV Intermittent every 24 hours  sodium chloride 0.45%. 1000 milliLiter(s) (60 mL/Hr) IV Continuous <Continuous>      PHYSICAL EXAM:  T(C): 36.9 (01-13-23 @ 05:23), Max: 38.7 (01-12-23 @ 10:25)  HR: 89 (01-13-23 @ 09:30) (65 - 132)  BP: 109/65 (01-13-23 @ 08:00) (85/60 - 118/75)  RR: 18 (01-13-23 @ 08:00) (17 - 26)  SpO2: 100% (01-13-23 @ 09:30) (92% - 100%)  Wt(kg): --  I&O's Summary          Appearance: Lethargic  HEENT: dry oral mucosa, PERRL, EOMI	  Lymphatic: No lymphadenopathy  Cardiovascular: Irregular  S1 S2, No JVD, No murmurs, No edema  Respiratory: Decreased BS, on BiPAP  Psychiatry: A & O x 1, Lethargic  Gastrointestinal: Soft, Non-tender, + BS	  Skin: No rashes, No ecchymoses, No cyanosis	  Neurologic: Non-focal  Extremities: Decreased ROM/Strength, No clubbing, cyanosis or edema  Vascular: Peripheral pulses palpable 2+ bilaterally      LABS:    CARDIAC MARKERS:                                11.4   11.79 )-----------( 180      ( 12 Jan 2023 10:21 )             36.0     01-12    136  |  95<L>  |  21  ----------------------------<  278<H>  3.8   |  29  |  0.91    Ca    9.8      12 Jan 2023 10:21    TPro  7.3  /  Alb  3.9  /  TBili  1.2  /  DBili  x   /  AST  25  /  ALT  11  /  AlkPhos  73  01-12            TELEMETRY:   ECG:  	  RADIOLOGY:   DIAGNOSTIC TESTING:  [ ] Echocardiogram:  [ ]  Catheterization:  [ ] Stress Test:    OTHER:

## 2023-01-13 NOTE — PROGRESS NOTE ADULT - ASSESSMENT
89 yo female with PMHx of CAD, DM, HLD, GERD, atrial thrombus , Afib on Eliquis, CHF, AS p/w fever.  Patient unable to provide history.  Per EMS patient has had fevers, body aches and shortness of breath since yesterday.  Patient lives at home alone with her home health aide.     1/12:  Upon arrival, EMS states patient was sating at 90% on room air with improvement on 2 L nasal cannula. Ptn was admitted with a similar presentation nearly a year ago and was covid pos then as she is now as well. She is DNR/DNI as per her HCP  Will treat w remdesivir, dexamethasone, insulin on a sliding scale, IVF, ID, pulm consults called. dvt ppx not necessary, ptn is on chronic AC w ELiquis    1/13: ptn is dyspneic, has a lot of secretions, remains on BIPAP, seen by PULM, ID, Card, on chroninc AC, DDImer >11K, Procal >1, on steroids, remdesivir, day 2 hospitalization for acute hypoxic respiratory failure 2/2 COvid PNA. ptn is DNR/DNI as per HCP. will add Zosyn, ID in agreement, will add respiratory vest for secretion mobilization

## 2023-01-13 NOTE — PROGRESS NOTE ADULT - ASSESSMENT
89 yo female with PMHx of CAD, DM, HLD, GERD, atrial thrombus , Afib on Eliquis, CHF, AS p/w fever.  Patient unable to provide history.  Per EMS patient has had fevers, body aches and shortness of breath since yesterday.  Patient lives at home alone with her home health aide.  Upon arrival, EMS states patient was sating at 90% on room air with improvement on 2 L nasal cannula. Ptn was admitted with a similar presentation nearly a year ago and was covid pos then as she is now as well.

## 2023-01-14 LAB
CRP SERPL-MCNC: 81 MG/L — HIGH (ref 0–4)
FERRITIN SERPL-MCNC: 148 NG/ML — SIGNIFICANT CHANGE UP (ref 15–150)
GLUCOSE BLDC GLUCOMTR-MCNC: 226 MG/DL — HIGH (ref 70–99)
GLUCOSE BLDC GLUCOMTR-MCNC: 228 MG/DL — HIGH (ref 70–99)
GLUCOSE BLDC GLUCOMTR-MCNC: 326 MG/DL — HIGH (ref 70–99)
GLUCOSE BLDC GLUCOMTR-MCNC: 378 MG/DL — HIGH (ref 70–99)
LDH SERPL L TO P-CCNC: 171 U/L — SIGNIFICANT CHANGE UP (ref 50–242)

## 2023-01-14 PROCEDURE — 71250 CT THORAX DX C-: CPT | Mod: 26

## 2023-01-14 RX ADMIN — Medication 100 MILLIGRAM(S): at 21:36

## 2023-01-14 RX ADMIN — Medication 3: at 21:36

## 2023-01-14 RX ADMIN — Medication 2: at 07:44

## 2023-01-14 RX ADMIN — APIXABAN 2.5 MILLIGRAM(S): 2.5 TABLET, FILM COATED ORAL at 18:55

## 2023-01-14 RX ADMIN — REMDESIVIR 200 MILLIGRAM(S): 5 INJECTION INTRAVENOUS at 18:55

## 2023-01-14 RX ADMIN — APIXABAN 2.5 MILLIGRAM(S): 2.5 TABLET, FILM COATED ORAL at 05:44

## 2023-01-14 RX ADMIN — Medication 2: at 11:46

## 2023-01-14 RX ADMIN — Medication 4: at 17:00

## 2023-01-14 RX ADMIN — Medication 100 MILLIGRAM(S): at 07:26

## 2023-01-14 RX ADMIN — INSULIN GLARGINE 10 UNIT(S): 100 INJECTION, SOLUTION SUBCUTANEOUS at 21:37

## 2023-01-14 RX ADMIN — CHLORHEXIDINE GLUCONATE 1 APPLICATION(S): 213 SOLUTION TOPICAL at 05:45

## 2023-01-14 RX ADMIN — PIPERACILLIN AND TAZOBACTAM 25 GRAM(S): 4; .5 INJECTION, POWDER, LYOPHILIZED, FOR SOLUTION INTRAVENOUS at 05:43

## 2023-01-14 RX ADMIN — Medication 100 MILLIGRAM(S): at 14:12

## 2023-01-14 RX ADMIN — Medication 6 MILLIGRAM(S): at 07:27

## 2023-01-14 RX ADMIN — PIPERACILLIN AND TAZOBACTAM 25 GRAM(S): 4; .5 INJECTION, POWDER, LYOPHILIZED, FOR SOLUTION INTRAVENOUS at 14:13

## 2023-01-14 NOTE — PROGRESS NOTE ADULT - SUBJECTIVE AND OBJECTIVE BOX
Patient is a 89y old  Female who presents with a chief complaint of resp failure (13 Jan 2023 18:37)      SUBJECTIVE / OVERNIGHT EVENTS: ptn feels much better, on 4 l O2 NC, high FS while on decadron, ENDO called, pulm and ID following    MEDICATIONS  (STANDING):  apixaban 2.5 milliGRAM(s) Oral every 12 hours  benzonatate 100 milliGRAM(s) Oral three times a day  chlorhexidine 2% Cloths 1 Application(s) Topical <User Schedule>  dexAMETHasone  Injectable 6 milliGRAM(s) IV Push daily  dextrose 5%. 1000 milliLiter(s) (50 mL/Hr) IV Continuous <Continuous>  dextrose 5%. 1000 milliLiter(s) (100 mL/Hr) IV Continuous <Continuous>  dextrose 50% Injectable 25 Gram(s) IV Push once  dextrose 50% Injectable 12.5 Gram(s) IV Push once  dextrose 50% Injectable 25 Gram(s) IV Push once  glucagon  Injectable 1 milliGRAM(s) IntraMuscular once  insulin glargine Injectable (LANTUS) 10 Unit(s) SubCutaneous at bedtime  insulin lispro (ADMELOG) corrective regimen sliding scale   SubCutaneous three times a day before meals  insulin lispro (ADMELOG) corrective regimen sliding scale   SubCutaneous at bedtime  piperacillin/tazobactam IVPB.. 3.375 Gram(s) IV Intermittent every 8 hours  remdesivir  IVPB   IV Intermittent   remdesivir  IVPB 100 milliGRAM(s) IV Intermittent every 24 hours  sodium chloride 0.45%. 1000 milliLiter(s) (60 mL/Hr) IV Continuous <Continuous>    MEDICATIONS  (PRN):  acetaminophen     Tablet .. 650 milliGRAM(s) Oral every 6 hours PRN Temp greater or equal to 38C (100.4F), Mild Pain (1 - 3)  dextrose Oral Gel 15 Gram(s) Oral once PRN Blood Glucose LESS THAN 70 milliGRAM(s)/deciliter  guaiFENesin Oral Liquid (Sugar-Free) 200 milliGRAM(s) Oral every 6 hours PRN Cough      Vital Signs Last 24 Hrs  T(F): 98.3 (01-14-23 @ 14:21), Max: 98.6 (01-14-23 @ 05:43)  HR: 78 (01-14-23 @ 16:09) (61 - 112)  BP: 121/80 (01-14-23 @ 14:21) (119/73 - 153/85)  RR: 18 (01-14-23 @ 14:21) (18 - 20)  SpO2: 100% (01-14-23 @ 16:09) (99% - 110%)  Telemetry:   CAPILLARY BLOOD GLUCOSE      POCT Blood Glucose.: 226 mg/dL (14 Jan 2023 11:32)  POCT Blood Glucose.: 228 mg/dL (14 Jan 2023 07:43)  POCT Blood Glucose.: 336 mg/dL (13 Jan 2023 21:33)    I&O's Summary    13 Jan 2023 07:01  -  14 Jan 2023 07:00  --------------------------------------------------------  IN: 1120 mL / OUT: 300 mL / NET: 820 mL    14 Jan 2023 07:01  -  14 Jan 2023 16:32  --------------------------------------------------------  IN: 400 mL / OUT: 300 mL / NET: 100 mL        PHYSICAL EXAM:  GENERAL: NAD, well-developed  HEAD:  Atraumatic, Normocephalic  EYES: EOMI, PERRLA, conjunctiva and sclera clear  NECK: Supple, No JVD  CHEST/LUNG: Clear to auscultation bilaterally; No wheeze  HEART: Regular rate and rhythm; No murmurs, rubs, or gallops  ABDOMEN: Soft, Nontender, Nondistended; Bowel sounds present  EXTREMITIES:  2+ Peripheral Pulses, No clubbing, cyanosis, or edema  PSYCH: AAOx3  NEUROLOGY: non-focal  SKIN: No rashes or lesions    LABS:                        10.8   8.60  )-----------( 169      ( 13 Jan 2023 11:35 )             33.8     01-13    136  |  97  |  23  ----------------------------<  240<H>  4.3   |  26  |  0.79    Ca    9.7      13 Jan 2023 11:35    TPro  6.9  /  Alb  3.5  /  TBili  0.8  /  DBili  0.2  /  AST  20  /  ALT  11  /  AlkPhos  61  01-13              RADIOLOGY & ADDITIONAL TESTS:    Imaging Personally Reviewed:    Consultant(s) Notes Reviewed:      Care Discussed with Consultants/Other Providers:

## 2023-01-14 NOTE — PROGRESS NOTE ADULT - SUBJECTIVE AND OBJECTIVE BOX
[facility], Division of Infectious Diseases  ALINA Mcneil Y. Patel, S. Shah, G. Jonah  [phone]  after hours and weekends [phone]    Name: SONYA MYERS  Age: 89y  Gender: Female  MRN: [id number]    Interval History--  states she just had a test  feels ok        Allergies    No Known Allergies    Intolerances        Medications--  Antibiotics:  piperacillin/tazobactam IVPB.. 3.375 Gram(s) IV Intermittent every 8 hours  remdesivir  IVPB   IV Intermittent   remdesivir  IVPB 100 milliGRAM(s) IV Intermittent every 24 hours    Immunologic:    Other:  acetaminophen     Tablet .. PRN  apixaban  benzonatate  chlorhexidine 2% Cloths  dexAMETHasone  Injectable  dextrose 5%.  dextrose 5%.  dextrose 50% Injectable  dextrose 50% Injectable  dextrose 50% Injectable  dextrose Oral Gel PRN  glucagon  Injectable  guaiFENesin Oral Liquid (Sugar-Free) PRN  insulin glargine Injectable (LANTUS)  insulin lispro (ADMELOG) corrective regimen sliding scale  insulin lispro (ADMELOG) corrective regimen sliding scale  sodium chloride 0.45%.      Review of Systems--  A 10-point review of systems was obtained.     Pertinent positives and negatives--  Constitutional: No fevers. No Chills. No Rigors.   Cardiovascular: No chest pain. No palpitations.  Respiratory: No shortness of breath. No cough.  Gastrointestinal: No nausea or vomiting. No diarrhea or constipation.   Psychiatric: Pleasant. Appropriate affect.    Review of systems otherwise negative except as previously noted.    Physical Examination--  Vital Signs: T(F): 98.3 (01-14-23 @ 14:21), Max: 98.6 (01-14-23 @ 05:43)  HR: 65 (01-14-23 @ 14:21)  BP: 121/80 (01-14-23 @ 14:21)  RR: 18 (01-14-23 @ 14:21)  SpO2: 110% (01-14-23 @ 14:21)  Wt(kg): --  General: mild rsp distress   HEENT: AT/NC.high flow .  Neck: Not rigid. No sense of mass.  Nodes: None palpable.  Lungs: Clear bilaterally without rales, wheezing or rhonchi  Heart: irreg S1s2  Abdomen: Bowel sounds present and normoactive. Soft. Nondistended.  Extremities: No cyanosis or clubbing. + edema.   Skin: Warm. Dry. Good turgor. No rash. No vasculitic stigmata.  Psychiatric: Appropriate affect and mood for situation.         Laboratory Studies--  CBC                        10.8   8.60  )-----------( 169      ( 13 Jan 2023 11:35 )             33.8       Chemistries  01-13    136  |  97  |  23  ----------------------------<  240<H>  4.3   |  26  |  0.79    Ca    9.7      13 Jan 2023 11:35    TPro  6.9  /  Alb  3.5  /  TBili  0.8  /  DBili  0.2  /  AST  20  /  ALT  11  /  AlkPhos  61  01-13      Culture Data    Culture - Urine (collected 12 Jan 2023 10:32)  Source: Clean Catch Clean Catch (Midstream)  Final Report (13 Jan 2023 11:01):    No growth    Culture - Blood (collected 12 Jan 2023 09:50)  Source: .Blood Blood-Peripheral  Preliminary Report (13 Jan 2023 15:02):    No growth to date.    Culture - Blood (collected 12 Jan 2023 09:35)  Source: .Blood Blood-Peripheral  Preliminary Report (13 Jan 2023 15:02):    No growth to date.

## 2023-01-14 NOTE — PROGRESS NOTE ADULT - SUBJECTIVE AND OBJECTIVE BOX
Subjective: Patient seen and examined. No new events except as noted.   Seen earlier this am.  Comfortable on NC.     REVIEW OF SYSTEMS:    CONSTITUTIONAL: + weakness, fevers or chills  EYES/ENT: No visual changes;  No vertigo or throat pain   NECK: No pain or stiffness  RESPIRATORY: No cough, wheezing, hemoptysis; No shortness of breath  CARDIOVASCULAR: No chest pain or palpitations  GASTROINTESTINAL: No abdominal or epigastric pain. No nausea, vomiting, or hematemesis; No diarrhea or constipation. No melena or hematochezia.  GENITOURINARY: No dysuria, frequency or hematuria  NEUROLOGICAL: No numbness or weakness  SKIN: No itching, burning, rashes, or lesions   All other review of systems is negative unless indicated above.    MEDICATIONS:  MEDICATIONS  (STANDING):  apixaban 2.5 milliGRAM(s) Oral every 12 hours  benzonatate 100 milliGRAM(s) Oral three times a day  chlorhexidine 2% Cloths 1 Application(s) Topical <User Schedule>  dexAMETHasone  Injectable 6 milliGRAM(s) IV Push daily  dextrose 5%. 1000 milliLiter(s) (50 mL/Hr) IV Continuous <Continuous>  dextrose 5%. 1000 milliLiter(s) (100 mL/Hr) IV Continuous <Continuous>  dextrose 50% Injectable 25 Gram(s) IV Push once  dextrose 50% Injectable 12.5 Gram(s) IV Push once  dextrose 50% Injectable 25 Gram(s) IV Push once  glucagon  Injectable 1 milliGRAM(s) IntraMuscular once  insulin glargine Injectable (LANTUS) 10 Unit(s) SubCutaneous at bedtime  insulin lispro (ADMELOG) corrective regimen sliding scale   SubCutaneous three times a day before meals  insulin lispro (ADMELOG) corrective regimen sliding scale   SubCutaneous at bedtime  piperacillin/tazobactam IVPB.. 3.375 Gram(s) IV Intermittent every 8 hours  remdesivir  IVPB   IV Intermittent   remdesivir  IVPB 100 milliGRAM(s) IV Intermittent every 24 hours  sodium chloride 0.45%. 1000 milliLiter(s) (60 mL/Hr) IV Continuous <Continuous>      PHYSICAL EXAM:  T(C): 36.8 (01-14-23 @ 14:21), Max: 37 (01-14-23 @ 05:43)  HR: 84 (01-14-23 @ 16:56) (61 - 112)  BP: 143/79 (01-14-23 @ 16:56) (119/73 - 149/93)  RR: 18 (01-14-23 @ 14:21) (18 - 18)  SpO2: 100% (01-14-23 @ 16:09) (99% - 110%)  Wt(kg): --  I&O's Summary    13 Jan 2023 07:01  -  14 Jan 2023 07:00  --------------------------------------------------------  IN: 1120 mL / OUT: 300 mL / NET: 820 mL    14 Jan 2023 07:01  -  14 Jan 2023 17:59  --------------------------------------------------------  IN: 400 mL / OUT: 300 mL / NET: 100 mL    Appearance: NAD	  HEENT: dry oral mucosa, PERRL, EOMI	  Lymphatic: No lymphadenopathy , no edema  Cardiovascular: Irregular S1 S2, No JVD, No murmurs , Peripheral pulses palpable 2+ bilaterally  Respiratory: Decreased BS, on NC 	  Gastrointestinal:  oft, Non-tender, + BS	  Skin: No rashes, No ecchymoses, No cyanosis, warm to touch  Musculoskeletal: Normal range of motion, normal strength  Psychiatry: Mood & affect appropriate  Ext: No edema    LABS:    CARDIAC MARKERS:                        10.8   8.60  )-----------( 169      ( 13 Jan 2023 11:35 )             33.8     01-13    136  |  97  |  23  ----------------------------<  240<H>  4.3   |  26  |  0.79    Ca    9.7      13 Jan 2023 11:35    TPro  6.9  /  Alb  3.5  /  TBili  0.8  /  DBili  0.2  /  AST  20  /  ALT  11  /  AlkPhos  61  01-13    proBNP:   Lipid Profile:   HgA1c:   TSH:     TELEMETRY: Afib 70-110s, up to 150s	    ECG:  	  RADIOLOGY:   DIAGNOSTIC TESTING:  [ ] Echocardiogram:  [ ]  Catheterization:  [ ] Stress Test:    OTHER:

## 2023-01-14 NOTE — PROGRESS NOTE ADULT - ASSESSMENT
89 yo female with PMHx of CAD, DM, HLD, GERD, atrial thrombus , Afib on Eliquis, CHF, AS p/w fever.  Patient unable to provide history.  Per EMS patient has had fevers, body aches and shortness of breath since yesterday.  Patient lives at home alone with her home health aide.  Upon arrival, EMS states patient was sating at 90% on room air with improvement on 2 L nasal cannula. Ptn was admitted with a similar presentation nearly a year ago and was covid pos then as she is now as well. She is DNR/DNI as per her HCP  Will treat w remdesivir, dexamethasone, insulin on a sliding scale, IVF, ID, pulm consults called. dvt ppx w po Xarelto;    Covid infection: / resp failure   A fib  :  CHF:  Aortic stenosis:   DM:   GERD:   cad     1/13/2023      Covid infection: / resp failure  : ON REMDESIVIR AND DEXA:  SHE HAD COVID TWO YEARS AGO ALSO : ON BIPAP:  She is on empirically for bacterial infection:   A fib  : on eliquis  CHF:  per primary team : she has heart failure:  her legs were very swollen before two weeks ago per her neice  Aortic stenosis: has sever AS:  didb not do any treatment / tavr:    DM:  monitor and control   GERD: PPI  cad  : cont current meds::  on eliquis    dvt propahylaxis    1/14:      Covid infection: / resp failure  : ON REMDESIVIR AND DEXA:  She looks pretty good today  : o n 4 L of oxygen : she is alert and awake and reponds to questions  A fib  : on eliquis  CHF:  per primary team : she has heart failure:  her legs were very swollen before two weeks ago per her neice : not on diuretics at this t mulugeta  Aortic stenosis: has sever AS:  did  not do any treatment    DM:  monitor and control   GERD: PPI  cad  : cont current meds::  on eliquis:  dw acp     dvt prophylaxis

## 2023-01-14 NOTE — PROGRESS NOTE ADULT - SUBJECTIVE AND OBJECTIVE BOX
Date of Service: 01-14-23 @ 13:13    Patient is a 89y old  Female who presents with a chief complaint of resp failure (13 Jan 2023 18:37)      Any change in ROS:  pt looks much better ; on 4 L of oxygen:   pt is hungry  : she wants to eat:     MEDICATIONS  (STANDING):  apixaban 2.5 milliGRAM(s) Oral every 12 hours  benzonatate 100 milliGRAM(s) Oral three times a day  chlorhexidine 2% Cloths 1 Application(s) Topical <User Schedule>  dexAMETHasone  Injectable 6 milliGRAM(s) IV Push daily  dextrose 5%. 1000 milliLiter(s) (50 mL/Hr) IV Continuous <Continuous>  dextrose 5%. 1000 milliLiter(s) (100 mL/Hr) IV Continuous <Continuous>  dextrose 50% Injectable 25 Gram(s) IV Push once  dextrose 50% Injectable 12.5 Gram(s) IV Push once  dextrose 50% Injectable 25 Gram(s) IV Push once  glucagon  Injectable 1 milliGRAM(s) IntraMuscular once  insulin glargine Injectable (LANTUS) 10 Unit(s) SubCutaneous at bedtime  insulin lispro (ADMELOG) corrective regimen sliding scale   SubCutaneous three times a day before meals  insulin lispro (ADMELOG) corrective regimen sliding scale   SubCutaneous at bedtime  piperacillin/tazobactam IVPB.- 3.375 Gram(s) IV Intermittent once  piperacillin/tazobactam IVPB.. 3.375 Gram(s) IV Intermittent every 8 hours  remdesivir  IVPB 100 milliGRAM(s) IV Intermittent every 24 hours  remdesivir  IVPB   IV Intermittent   sodium chloride 0.45%. 1000 milliLiter(s) (60 mL/Hr) IV Continuous <Continuous>    MEDICATIONS  (PRN):  acetaminophen     Tablet .. 650 milliGRAM(s) Oral every 6 hours PRN Temp greater or equal to 38C (100.4F), Mild Pain (1 - 3)  dextrose Oral Gel 15 Gram(s) Oral once PRN Blood Glucose LESS THAN 70 milliGRAM(s)/deciliter  guaiFENesin Oral Liquid (Sugar-Free) 200 milliGRAM(s) Oral every 6 hours PRN Cough    Vital Signs Last 24 Hrs  T(C): 37 (14 Jan 2023 05:43), Max: 37 (13 Jan 2023 15:04)  T(F): 98.6 (14 Jan 2023 05:43), Max: 98.6 (13 Jan 2023 15:04)  HR: 61 (14 Jan 2023 09:39) (61 - 112)  BP: 134/87 (14 Jan 2023 05:43) (116/75 - 153/85)  BP(mean): --  RR: 18 (14 Jan 2023 05:43) (18 - 20)  SpO2: 100% (14 Jan 2023 09:39) (99% - 100%)    Parameters below as of 14 Jan 2023 05:43  Patient On (Oxygen Delivery Method): nasal cannula  O2 Flow (L/min): 4      I&O's Summary    13 Jan 2023 07:01  -  14 Jan 2023 07:00  --------------------------------------------------------  IN: 1120 mL / OUT: 300 mL / NET: 820 mL    14 Jan 2023 07:01  -  14 Jan 2023 13:13  --------------------------------------------------------  IN: 400 mL / OUT: 300 mL / NET: 100 mL          Physical Exam:   GENERAL: Ncalm   HEENT: LORNA/   Atraumatic, Normocephalic  ENMT: No tonsillar erythema, exudates, or enlargement; Moist mucous membranes, Good dentition, No lesions  NECK: Supple, No JVD, Normal thyroid  CHEST/LUNG: Crackles bilaterally:   CVS: Regular rate and rhythm; No murmurs, rubs, or gallops  GI: : Soft, Nontender, Nondistended; Bowel sounds present  NERVOUS SYSTEM:  Alert & awake responsive  EXTREMITIES:  - edema  LYMPH: No lymphadenopathy noted  SKIN: No rashes or lesions  ENDOCRINOLOGY: No Thyromegaly  PSYCH: Appropriate    Labs:  ABG - ( 13 Jan 2023 11:34 )  pH, Arterial: 7.47  pH, Blood: x     /  pCO2: 38    /  pO2: 219   / HCO3: 28    / Base Excess: 3.9   /  SaO2: 99.9            32                            10.8   8.60  )-----------( 169      ( 13 Jan 2023 11:35 )             33.8                         11.4   11.79 )-----------( 180      ( 12 Jan 2023 10:21 )             36.0     01-13    136  |  97  |  23  ----------------------------<  240<H>  4.3   |  26  |  0.79  01-12    136  |  95<L>  |  21  ----------------------------<  278<H>  3.8   |  29  |  0.91    Ca    9.7      13 Jan 2023 11:35    TPro  6.9  /  Alb  3.5  /  TBili  0.8  /  DBili  0.2  /  AST  20  /  ALT  11  /  AlkPhos  61  01-13  TPro  7.3  /  Alb  3.9  /  TBili  1.2  /  DBili  x   /  AST  25  /  ALT  11  /  AlkPhos  73  01-12    CAPILLARY BLOOD GLUCOSE      POCT Blood Glucose.: 226 mg/dL (14 Jan 2023 11:32)  POCT Blood Glucose.: 228 mg/dL (14 Jan 2023 07:43)  POCT Blood Glucose.: 336 mg/dL (13 Jan 2023 21:33)  POCT Blood Glucose.: 206 mg/dL (13 Jan 2023 16:27)      LIVER FUNCTIONS - ( 13 Jan 2023 11:35 )  Alb: 3.5 g/dL / Pro: 6.9 g/dL / ALK PHOS: 61 U/L / ALT: 11 U/L / AST: 20 U/L / GGT: x               D-Dimer Assay, Quantitative: 395 ng/mL DDU (01-13 @ 11:35)  Procalcitonin, Serum: 6.13 ng/mL (01-13 @ 06:42)  Serum Pro-Brain Natriuretic Peptide: 56859 pg/mL (01-13 @ 11:35)        RECENT CULTURES:  01-12 @ 10:32 Clean Catch Clean Catch (Midstream)       rad< from: POCUS ED TTE 2D F/U, Limited w/o Cont. (01.12.23 @ 12:41) >  FOCUSED ED ULTRASOUND REPORT          INTERPRETATION:  A focused transthoracic cardiac ultrasound examination   was performed.  Trace pericardial effusion was present.  No global wall motion abnormality was identified  Tricuspid regurgitation was present  IVC greater than 2cm  Scattered B lines, greater on the right    IMPRESSION:  Trace pericardial effusion  Right ventricular enlargement  Tricuspid regurgitation present    --- End of Report ---            LYNN RODARTE MD; Attending Emergency Medicine  This document has been electronically signed. Jan 12 2023 12:42PM    < end of copied text >  < from: Xray Chest 1 View- PORTABLE-Urgent (01.12.23 @ 11:11) >  PROCEDURE DATE:  01/12/2023          INTERPRETATION:  EXAMINATION: XR CHEST URGENT    CLINICAL INDICATION: Sepsis    TECHNIQUE: Single frontal portable view of the chest from1/12/2023 11:11   AM    COMPARISON: 6/28/2022    FINDINGS:    The heart size is slightly enlarged  Perihilar hazy opacities.  There is no pneumothorax.  Small bilateral pleural effusions.    IMPRESSION:  Perihilar hazy opacities.  Small bilateral pleural effusions.    --- End of Report ---           SUKH OLGUIN MD; Resident Radiologist  This document has been electronically signed.  DEIDRA HERRERA MD; Attending Interventional Radiologist  This document has been electronically signed. Jan 12 2023  2:26PM    < end of copied text >           No growth    01-12 @ 09:50 .Blood Blood-Peripheral                No growth to date.    01-12 @ 09:35 .Blood Blood-Peripheral                No growth to date.          RESPIRATORY CULTURES:          Studies  Chest X-RAY  CT SCAN Chest   Venous Dopplers: LE:   CT Abdomen  Others

## 2023-01-14 NOTE — PROGRESS NOTE ADULT - ASSESSMENT
89 y/o F PMHx CAD, DM, HLD, GERD, atrial thrombus, Afib on Eliquis, CHF, AS who presented with fever, SOB.     sepsis 2/2 COVID, acute hypoxic resp failure    CXR b/l hazy opacities  UA negative  remdesivir day 3 of 5   monitor liver enzymes stable   c/w decadron x 10 days  blood cx neg tod date  trend inflammatory markers  monitor SpO2, c/w supplemental O2 as needed, wean as tolerated    on  zosyn for possible superimposed bacterial PNA    ct chest reviewed by me no focal consolidation b/l effusion and atelectasis and vascular congestion  will await - official read     supportive care  trend temps, wbc  isolation per infection control policy   GOC discussion       89 y/o F PMHx CAD, DM, HLD, GERD, atrial thrombus, Afib on Eliquis, CHF, AS who presented with fever, SOB.     sepsis 2/2 COVID, acute hypoxic resp failure    CXR b/l hazy opacities  UA negative  remdesivir day 3 of 5   monitor liver enzymes stable   c/w decadron x 10 days  blood cx neg tod date  trend inflammatory markers  monitor SpO2, c/w supplemental O2 as needed, wean as tolerated      ct chest reviewed by me no focal consolidation b/l effusion and atelectasis and vascular congestion  stop zosyn    supportive care  trend temps, wbc  isolation per infection control policy   GOC discussion

## 2023-01-14 NOTE — PROGRESS NOTE ADULT - ASSESSMENT
89 yo female with PMHx of CAD, DM, HLD, GERD, atrial thrombus , Afib on Eliquis, CHF, AS p/w fever.  Patient unable to provide history.  Per EMS patient has had fevers, body aches and shortness of breath since yesterday.  Patient lives at home alone with her home health aide.     1/12:  Upon arrival, EMS states patient was sating at 90% on room air with improvement on 2 L nasal cannula. Ptn was admitted with a similar presentation nearly a year ago and was covid pos then as she is now as well. She is DNR/DNI as per her HCP  Will treat w remdesivir, dexamethasone, insulin on a sliding scale, IVF, ID, pulm consults called. dvt ppx not necessary, ptn is on chronic AC w ELiquis    1/13: ptn is dyspneic, has a lot of secretions, remains on BIPAP, seen by PULM, ID, Card, on chroninc AC, DDImer >11K, Procal >1, on steroids, remdesivir, day 2 hospitalization for acute hypoxic respiratory failure 2/2 COvid PNA. ptn is DNR/DNI as per HCP. will add Zosyn, ID in agreement, will add respiratory vest for secretion mobilization    1/14: ptn feels much better, on 4 l O2 NC, high FS while on decadron, ENDO called, pulm and ID following. cont remdesivir, decadron zosyn. check speech and swallow eval, keep on dysphagia 2 diet for now,

## 2023-01-15 LAB
CRP SERPL-MCNC: 49 MG/L — HIGH (ref 0–4)
FERRITIN SERPL-MCNC: 116 NG/ML — SIGNIFICANT CHANGE UP (ref 15–150)
GLUCOSE BLDC GLUCOMTR-MCNC: 231 MG/DL — HIGH (ref 70–99)
GLUCOSE BLDC GLUCOMTR-MCNC: 253 MG/DL — HIGH (ref 70–99)
GLUCOSE BLDC GLUCOMTR-MCNC: 296 MG/DL — HIGH (ref 70–99)
GLUCOSE BLDC GLUCOMTR-MCNC: 323 MG/DL — HIGH (ref 70–99)
LDH SERPL L TO P-CCNC: 188 U/L — SIGNIFICANT CHANGE UP (ref 50–242)

## 2023-01-15 RX ORDER — INSULIN LISPRO 100/ML
4 VIAL (ML) SUBCUTANEOUS
Refills: 0 | Status: DISCONTINUED | OUTPATIENT
Start: 2023-01-15 | End: 2023-01-16

## 2023-01-15 RX ORDER — DEXAMETHASONE 0.5 MG/5ML
6 ELIXIR ORAL DAILY
Refills: 0 | Status: DISCONTINUED | OUTPATIENT
Start: 2023-01-16 | End: 2023-01-17

## 2023-01-15 RX ORDER — INSULIN GLARGINE 100 [IU]/ML
7 INJECTION, SOLUTION SUBCUTANEOUS AT BEDTIME
Refills: 0 | Status: DISCONTINUED | OUTPATIENT
Start: 2023-01-15 | End: 2023-01-16

## 2023-01-15 RX ORDER — METOPROLOL TARTRATE 50 MG
25 TABLET ORAL DAILY
Refills: 0 | Status: DISCONTINUED | OUTPATIENT
Start: 2023-01-15 | End: 2023-01-16

## 2023-01-15 RX ADMIN — Medication 4 UNIT(S): at 11:51

## 2023-01-15 RX ADMIN — Medication 6 MILLIGRAM(S): at 06:00

## 2023-01-15 RX ADMIN — Medication 4: at 18:10

## 2023-01-15 RX ADMIN — Medication 3: at 11:51

## 2023-01-15 RX ADMIN — APIXABAN 2.5 MILLIGRAM(S): 2.5 TABLET, FILM COATED ORAL at 18:10

## 2023-01-15 RX ADMIN — Medication 100 MILLIGRAM(S): at 22:04

## 2023-01-15 RX ADMIN — INSULIN GLARGINE 7 UNIT(S): 100 INJECTION, SOLUTION SUBCUTANEOUS at 22:04

## 2023-01-15 RX ADMIN — Medication 4 UNIT(S): at 18:10

## 2023-01-15 RX ADMIN — Medication 100 MILLIGRAM(S): at 13:12

## 2023-01-15 RX ADMIN — APIXABAN 2.5 MILLIGRAM(S): 2.5 TABLET, FILM COATED ORAL at 06:00

## 2023-01-15 RX ADMIN — Medication 2: at 08:40

## 2023-01-15 RX ADMIN — Medication 25 MILLIGRAM(S): at 22:03

## 2023-01-15 RX ADMIN — Medication 1: at 22:04

## 2023-01-15 RX ADMIN — Medication 100 MILLIGRAM(S): at 06:00

## 2023-01-15 RX ADMIN — REMDESIVIR 200 MILLIGRAM(S): 5 INJECTION INTRAVENOUS at 18:10

## 2023-01-15 RX ADMIN — CHLORHEXIDINE GLUCONATE 1 APPLICATION(S): 213 SOLUTION TOPICAL at 06:01

## 2023-01-15 NOTE — CONSULT NOTE ADULT - SUBJECTIVE AND OBJECTIVE BOX
-- @ 18:38    Patient is a 89y old  Female who presents with a chief complaint of hypoxic resp failure (2023 17:35)      HPI:  89 yo female with PMHx of CAD, DM, HLD, GERD, atrial thrombus , Afib on Eliquis, CHF, AS p/w fever.  Patient unable to provide history.  Per EMS patient has had fevers, body aches and shortness of breath since yesterday.  Patient lives at home alone with her home health aide.  Upon arrival, EMS states patient was sating at 90% on room air with improvement on 2 L nasal cannula. Ptn was admitted with a similar presentation nearly a year ago and was covid pos then as she is now as well (2023 14:59)   she i s on bipap  and history is limited  : but she is able to responds to questions : hard to understand with bipap  on: :    called her family member : she is niece and HCP  :  covid post michelle :  she never had asthma : cod:  never had pe or dvt:   she has sever Aortic stenosis:   no sera: : she has benson vaccinated L: last yearl;  no booster: :   her aide has covid       ?FOLLOWING PRESENT  [ x] Hx of PE/DVT, [ x] Hx COPD, [ x] Hx of Asthma, [x ] Hx of Hospitalization, [ x]  Hx of BiPAP/CPAP use, [ x] Hx of SERA    Allergies    No Known Allergies    Intolerances        PAST MEDICAL & SURGICAL HISTORY:  Diabetes      Atrial fibrillation      Stented coronary artery      Coronary artery disease      No significant past surgical history          FAMILY HISTORY:  No pertinent family history in first degree relatives        Social History: [ x ] TOBACCO                  [x  ] ETOH                                 [x  ] IVDA/DRUGS    REVIEW OF SYSTEMS      General:	x    Skin/Breast:x  	  Ophthalmologic:x  	  ENMT:	x    Respiratory and Thorax:  sob,  arciniega :    	  Cardiovascular:	x    Gastrointestinal:	x    Genitourinary:	x    Musculoskeletal:	x    Neurological:	x    Psychiatric:	x    Hematology/Lymphatics:	x    Endocrine:	x    Allergic/Immunologic:	x    MEDICATIONS  (STANDING):  apixaban 2.5 milliGRAM(s) Oral every 12 hours  benzonatate 100 milliGRAM(s) Oral three times a day  chlorhexidine 2% Cloths 1 Application(s) Topical <User Schedule>  dexAMETHasone  Injectable 6 milliGRAM(s) IV Push daily  dextrose 5%. 1000 milliLiter(s) (50 mL/Hr) IV Continuous <Continuous>  dextrose 5%. 1000 milliLiter(s) (100 mL/Hr) IV Continuous <Continuous>  dextrose 50% Injectable 25 Gram(s) IV Push once  dextrose 50% Injectable 12.5 Gram(s) IV Push once  dextrose 50% Injectable 25 Gram(s) IV Push once  glucagon  Injectable 1 milliGRAM(s) IntraMuscular once  insulin glargine Injectable (LANTUS) 10 Unit(s) SubCutaneous at bedtime  insulin lispro (ADMELOG) corrective regimen sliding scale   SubCutaneous three times a day before meals  piperacillin/tazobactam IVPB.- 3.375 Gram(s) IV Intermittent once  remdesivir  IVPB 100 milliGRAM(s) IV Intermittent every 24 hours  remdesivir  IVPB   IV Intermittent   sodium chloride 0.45%. 1000 milliLiter(s) (60 mL/Hr) IV Continuous <Continuous>    MEDICATIONS  (PRN):  acetaminophen     Tablet .. 650 milliGRAM(s) Oral every 6 hours PRN Temp greater or equal to 38C (100.4F), Mild Pain (1 - 3)  dextrose Oral Gel 15 Gram(s) Oral once PRN Blood Glucose LESS THAN 70 milliGRAM(s)/deciliter  guaiFENesin Oral Liquid (Sugar-Free) 200 milliGRAM(s) Oral every 6 hours PRN Cough       Vital Signs Last 24 Hrs  T(C): 36.6 (2023 16:34), Max: 37 (2023 00:22)  T(F): 97.8 (2023 16:34), Max: 98.6 (2023 00:22)  HR: 110 (2023 16:34) (65 - 110)  BP: 153/85 (2023 16:34) (100/74 - 153/85)  BP(mean): 89 (2023 22:39) (84 - 89)  RR: 20 (2023 16:34) (17 - 22)  SpO2: 100% (2023 16:34) (92% - 100%)    Parameters below as of 2023 16:34  Patient On (Oxygen Delivery Method): nasal cannula  O2 Flow (L/min): 4  Orthostatic VS          I&O's Summary      Physical Exam:   GENERAL: NAD, well-groomed, well-developed  HEENT: LORNA/   Atraumatic, Normocephalic  ENMT: No tonsillar erythema, exudates, or enlargement; Moist mucous membranes, Good dentition, No lesions  NECK: Supple, No JVD, Normal thyroid  CHEST/LUNG: Clear to auscultation bilaterall- no wheezing  CVS: Regular rate and rhythm; No murmurs, rubs, or gallops  GI: : Soft, Nontender, Nondistended; Bowel sounds present  NERVOUS SYSTEM:  Alert & awake  EXTREMITIES:  2+ Peripheral Pulses, No clubbing, cyanosis, or edema  LYMPH: No lymphadenopathy noted  SKIN: No rashes or lesions  ENDOCRINOLOGY: No Thyromegaly  PSYCH: calm   Labs:  ABG - ( 2023 11:34 )  pH, Arterial: 7.47  pH, Blood: x     /  pCO2: 38    /  pO2: 219   / HCO3: 28    / Base Excess: 3.9   /  SaO2: 99.9            Venous<49<4>>52<<7.435>>Venous<<3><<4><<5<<529>>SARS-CoV-2: Detected (2023 10:21)                              10.8   8.60  )-----------( 169      ( 2023 11:35 )             33.8                         11.4   11.79 )-----------( 180      ( 2023 10:21 )             36.0     13    136  |  97  |  23  ----------------------------<  240<H>  4.3   |  26  |  0.79      136  |  95<L>  |  21  ----------------------------<  278<H>  3.8   |  29  |  0.91    Ca    9.7      2023 11:35  Ca    9.8      2023 10:21    TPro  6.9  /  Alb  3.5  /  TBili  0.8  /  DBili  0.2  /  AST  20  /  ALT  11  /  AlkPhos  61    TPro  7.3  /  Alb  3.9  /  TBili  1.2  /  DBili  x   /  AST  25  /  ALT  11  /  AlkPhos  73      CAPILLARY BLOOD GLUCOSE      POCT Blood Glucose.: 206 mg/dL (2023 16:27)  POCT Blood Glucose.: 218 mg/dL (2023 11:12)  POCT Blood Glucose.: 197 mg/dL (2023 07:27)  POCT Blood Glucose.: 275 mg/dL (2023 22:55)    LIVER FUNCTIONS - ( 2023 11:35 )  Alb: 3.5 g/dL / Pro: 6.9 g/dL / ALK PHOS: 61 U/L / ALT: 11 U/L / AST: 20 U/L / GGT: x           PT/INR - ( 2023 10:21 )   PT: 20.2 sec;   INR: 1.75 ratio         PTT - ( 2023 10:21 )  PTT:35.0 sec  Urinalysis Basic - ( 2023 10:32 )    Color: Light Yellow / Appearance: Clear / S.017 / pH: x  Gluc: x / Ketone: Trace  / Bili: Negative / Urobili: Negative   Blood: x / Protein: 30 mg/dL / Nitrite: Negative   Leuk Esterase: Negative / RBC: 3 /hpf / WBC 1 /HPF   Sq Epi: x / Non Sq Epi: 1 /hpf / Bacteria: Negative      Culture - Urine (collected 2023 10:32)  Source: Clean Catch Clean Catch (Midstream)  Final Report (2023 11:01):    No growth    Culture - Blood (collected 2023 09:50)  Source: .Blood Blood-Peripheral  Preliminary Report (2023 15:02):    No growth to date.    Culture - Blood (collected 2023 09:35)  Source: .Blood Blood-Peripheral  Preliminary Report (2023 15:02):    No growth to date.      D DImer  Procalcitonin, Serum: 6.13 ng/mL ( @ 06:42)  D-Dimer Assay, Quantitative: 395 ng/mL DDU ( @ 11:35)  Serum Pro-Brain Natriuretic Peptide: 62593 pg/mL ( @ 11:35)      Studies  Chest X-RAY  CT SCAN Chest   CT Abdomen  Venous Dopplers: LE:   Others      rad< from: Xray Chest 1 View- PORTABLE-Urgent (23 @ 11:11) >    CLINICAL INDICATION: Sepsis    TECHNIQUE: Single frontal portable view of the chest from2023 11:11   AM    COMPARISON: 2022    FINDINGS:    The heart size is slightly enlarged  Perihilar hazy opacities.  There is no pneumothorax.  Small bilateral pleural effusions.    IMPRESSION:  Perihilar hazy opacities.  Small bilateral pleural effusions.    --- End of Report ---           SUKH OLGUIN MD; Resident Radiologist  This document has been electronically signed.  DEIDRA HERRERA MD; Attending Interventional Radiologist  This document has been electronically signed. 2023  2:26PM    < end of copied text >  < from: Xray Chest 1 View- PORTABLE-Urgent (23 @ 11:11) >  ERPRETATION:  EXAMINATION: XR CHEST URGENT    CLINICAL INDICATION: Sepsis    TECHNIQUE: Single frontal portable view of the chest from2023 11:11   AM    COMPARISON: 2022    FINDINGS:    The heart size is slightly enlarged  Perihilar hazy opacities.  There is no pneumothorax.  Small bilateral pleural effusions.    IMPRESSION:  Perihilar hazy opacities.  Small bilateral pleural effusions.    --- End of Report ---           SUKH OLGUIN MD; Resident Radiologist  This document has been electronically signed.  DEIDRA HERRERA MD; Attending Interventional Radiologist  This document has been electronically signed. 2023  2:26PM    < end of copied text >  < from: TTE with Doppler (w/Cont) (21 @ 13:18) >  Pericardium/Pleura: Normal pericardium with trace  pericardial effusion.  Hemodynamic: Estimated right atrial pressure is 8 mm Hg.  Estimated right ventricular systolic pressure [id number] mm  Hg, assuming right atrial pressure equals 8 mm Hg,  consistent with moderate pulmonary hypertension. Color  Doppler demonstrates no evidence of a patent foramen ovale.  ------------------------------------------------------------------------  Conclusions:  1. Mitral annular calcification and calcified mitral  leaflets with normal diastolic opening. Minimal mitral  regurgitation.  2. Calcified trileaflet aortic valve with decreased  opening. Peak transaortic valve gradient equals 13 mm Hg,  mean transaortic valve gradient equals 8 mm Hg, estimated  aortic valve area equals 0.8 sqcm (by continuity equation),  aortic valve velocity time integral equals 30 cm,  consistent with severe aortic stenosis. Minimal aortic  regurgitation.  3. Low normal left ventricular systolic function. No  segmental wall motion abnormalities. Endocardial  visualization enhanced with intravenous injection of  Ultrasonic Enhancing Agent (Definity). No left ventricular  thrombus. Septal motion consistent with right ventricular  overload.  4. Normal right ventricular size with decreased right  ventricular systolic function.  5. Normal tricuspid valve. Moderate tricuspid  regurgitation.  6. Normal pericardium with trace pericardial effusion.  *** No previousEcho exam.  ------------------------------------------------------------------------  Confirmed on  2021 - 16:59:53 by J Carlos Waldron M.D.  ------------------------------------------------------------------------    < end of copied text >              
[facility], Division of Infectious Diseases  ALINA Mcneil S. Shah, Y. Patel, G. Jonah  [phone]  ([phone] - weekdays after 5pm and weekends)    SONYA MYERS  89y, Female  [id number]    HPI--  HPI:  89 y/o F PMHx CAD, DM, HLD, GERD, atrial thrombus, Afib on Eliquis, CHF, AS who presented with fever, SOB.  Patient unable to provide history.  Per EMS patient has had fevers, body aches and shortness of breath since yesterday.  Patient lives at home alone with her home health aide.  Upon arrival, EMS states patient was sating at 90% on room air with improvement on 2 L nasal cannula. Pt was admitted with a similar presentation nearly a year ago when she was COVID positive.    ROS: unable to obtain 2/2 patient's mentation    Allergies: No Known Allergies    PMH -- Diabetes    Atrial fibrillation    Stented coronary artery    Coronary artery disease      PSH -- No significant past surgical history      FH -- No pertinent family history in first degree relatives      Social History -- denies tobacco, alcohol or illicit drug use    Physical Exam--  Vital Signs Last 24 Hrs  T(F): 98.2 (2023 15:57), Max: 101.6 (2023 10:25)  HR: 99 (2023 16:37) (99 - 132)  BP: 96/70 (2023 15:57) (85/60 - 128/90)  RR: 24 (2023 15:57) (18 - 26)  SpO2: 100% (2023 16:37) (94% - 100%)  General: fatigued appearing  HEENT: anicteric, bipap  Neck: No LAD  Lungs: decreased breath sounds  Heart: S1, S2, normal rate  Abdomen: Soft. Nondistended. Nontender.   Neuro: lethargic but arousable to voice   Extremities: +1 b/l LE edema.   Skin: Warm. Dry.  Psychiatric: flat affect    Laboratory & Imaging Data--  CBC:                       11.4   11.79 )-----------( 180      ( 2023 10:21 )             36.0     WBC Count: 11.79 K/uL (23 @ 10:21)    CMP:     136  |  95<L>  |  21  ----------------------------<  278<H>  3.8   |  29  |  0.91    Ca    9.8      2023 10:21    TPro  7.3  /  Alb  3.9  /  TBili  1.2  /  DBili  x   /  AST  25  /  ALT  11  /  AlkPhos  73  12    LIVER FUNCTIONS - ( 2023 10:21 )  Alb: 3.9 g/dL / Pro: 7.3 g/dL / ALK PHOS: 73 U/L / ALT: 11 U/L / AST: 25 U/L / GGT: x           Urinalysis Basic - ( 2023 10:32 )    Color: Light Yellow / Appearance: Clear / S.017 / pH: x  Gluc: x / Ketone: Trace  / Bili: Negative / Urobili: Negative   Blood: x / Protein: 30 mg/dL / Nitrite: Negative   Leuk Esterase: Negative / RBC: 3 /hpf / WBC 1 /HPF   Sq Epi: x / Non Sq Epi: 1 /hpf / Bacteria: Negative      Microbiology:       Radiology--  ***  Active Medications--  acetaminophen     Tablet .. 650 milliGRAM(s) Oral every 6 hours PRN  apixaban 2.5 milliGRAM(s) Oral every 12 hours  dextrose 5%. 1000 milliLiter(s) IV Continuous <Continuous>  dextrose 5%. 1000 milliLiter(s) IV Continuous <Continuous>  dextrose 50% Injectable 25 Gram(s) IV Push once  dextrose 50% Injectable 12.5 Gram(s) IV Push once  dextrose 50% Injectable 25 Gram(s) IV Push once  dextrose Oral Gel 15 Gram(s) Oral once PRN  glucagon  Injectable 1 milliGRAM(s) IntraMuscular once  insulin glargine Injectable (LANTUS) 10 Unit(s) SubCutaneous at bedtime  insulin lispro (ADMELOG) corrective regimen sliding scale   SubCutaneous three times a day before meals  remdesivir  IVPB   IV Intermittent   remdesivir  IVPB 200 milliGRAM(s) IV Intermittent every 24 hours  sodium chloride 0.45%. 1000 milliLiter(s) IV Continuous <Continuous>    Antimicrobials:   remdesivir  IVPB   IV Intermittent   remdesivir  IVPB 200 milliGRAM(s) IV Intermittent every 24 hours    Immunologic:   
      HPI:  89 yo female with PMHx of CAD, DM, HLD, GERD, atrial thrombus , Afib on Eliquis, CHF, AS p/w fever.  Patient unable to provide history.  Per EMS patient has had fevers, body aches and shortness of breath since yesterday.  Patient lives at home alone with her home health aide.  Upon arrival, EMS states patient was sating at 90% on room air with improvement on 2 L nasal cannula. Ptn was admitted with a similar presentation nearly a year ago and was covid pos then as she is now as well (12 Jan 2023 14:59)  Patient has history of diabetes, on oral meds at home, no recent hypoglycemic episodes, no polyuria polydipsia. Patient follows up with PCP for diabetes management.    PAST MEDICAL & SURGICAL HISTORY:  Diabetes      Atrial fibrillation      Stented coronary artery      Coronary artery disease      No significant past surgical history          FAMILY HISTORY:  No pertinent family history in first degree relatives        Social History:    Outpatient Medications:    MEDICATIONS  (STANDING):  apixaban 2.5 milliGRAM(s) Oral every 12 hours  benzonatate 100 milliGRAM(s) Oral three times a day  chlorhexidine 2% Cloths 1 Application(s) Topical <User Schedule>  dexAMETHasone  Injectable 6 milliGRAM(s) IV Push daily  dextrose 5%. 1000 milliLiter(s) (100 mL/Hr) IV Continuous <Continuous>  dextrose 5%. 1000 milliLiter(s) (50 mL/Hr) IV Continuous <Continuous>  dextrose 50% Injectable 25 Gram(s) IV Push once  dextrose 50% Injectable 12.5 Gram(s) IV Push once  dextrose 50% Injectable 25 Gram(s) IV Push once  glucagon  Injectable 1 milliGRAM(s) IntraMuscular once  insulin glargine Injectable (LANTUS) 10 Unit(s) SubCutaneous at bedtime  insulin lispro (ADMELOG) corrective regimen sliding scale   SubCutaneous three times a day before meals  insulin lispro (ADMELOG) corrective regimen sliding scale   SubCutaneous at bedtime  remdesivir  IVPB   IV Intermittent   remdesivir  IVPB 100 milliGRAM(s) IV Intermittent every 24 hours  sodium chloride 0.45%. 1000 milliLiter(s) (60 mL/Hr) IV Continuous <Continuous>    MEDICATIONS  (PRN):  acetaminophen     Tablet .. 650 milliGRAM(s) Oral every 6 hours PRN Temp greater or equal to 38C (100.4F), Mild Pain (1 - 3)  dextrose Oral Gel 15 Gram(s) Oral once PRN Blood Glucose LESS THAN 70 milliGRAM(s)/deciliter  guaiFENesin Oral Liquid (Sugar-Free) 200 milliGRAM(s) Oral every 6 hours PRN Cough      Allergies    No Known Allergies    Intolerances      Review of Systems:  Constitutional: No fever, no chills  Eyes: No blurry vision  Neuro: No tremors  HEENT: No pain, no neck swelling  Cardiovascular: No chest pain, no palpitations  Respiratory: No SOB, no cough  GI: No nausea, vomiting, abdominal pain  : No dysuria  Skin: no rash  MSK: Has leg swelling.  Psych: no depression  Endocrine: no polyuria, polydipsia    UNABLE TO OBTAIN    ALL OTHER SYSTEMS REVIEWED AND NEGATIVE        PHYSICAL EXAM:  VITALS: T(C): 36.4 (01-15-23 @ 05:26)  T(F): 97.6 (01-15-23 @ 05:26), Max: 98.3 (01-14-23 @ 14:21)  HR: 62 (01-15-23 @ 09:08) (62 - 84)  BP: 136/80 (01-15-23 @ 05:26) (121/80 - 143/79)  RR:  (18 - 18)  SpO2:  (93% - 110%)  Wt(kg): --  GENERAL: NAD, well-groomed, well-developed  EYES: No proptosis, no lid lag  HEENT:  Atraumatic, Normocephalic  THYROID: Normal size, no palpable nodules  RESPIRATORY: Clear to auscultation bilaterally; No rales, rhonchi, wheezing  CARDIOVASCULAR: Si S2, No murmurs;  GI: Soft, non distended, normal bowel sounds  SKIN: Dry, intact, No rashes or lesions  MUSCULOSKELETAL: Has BL lower extremity edema.  NEURO:  no tremor, sensation decreased in feet BL,    POCT Blood Glucose.: 231 mg/dL (01-15-23 @ 07:47)  POCT Blood Glucose.: 378 mg/dL (01-14-23 @ 21:35)  POCT Blood Glucose.: 326 mg/dL (01-14-23 @ 16:33)  POCT Blood Glucose.: 226 mg/dL (01-14-23 @ 11:32)  POCT Blood Glucose.: 228 mg/dL (01-14-23 @ 07:43)  POCT Blood Glucose.: 336 mg/dL (01-13-23 @ 21:33)  POCT Blood Glucose.: 206 mg/dL (01-13-23 @ 16:27)  POCT Blood Glucose.: 218 mg/dL (01-13-23 @ 11:12)  POCT Blood Glucose.: 197 mg/dL (01-13-23 @ 07:27)  POCT Blood Glucose.: 275 mg/dL (01-12-23 @ 22:55)  POCT Blood Glucose.: 216 mg/dL (01-12-23 @ 18:05)                            10.8   8.60  )-----------( 169      ( 13 Jan 2023 11:35 )             33.8       01-13    136  |  97  |  23  ----------------------------<  240<H>  4.3   |  26  |  0.79    eGFR: 69    Ca    9.7      01-13    TPro  6.9  /  Alb  3.5  /  TBili  0.8  /  DBili  0.2  /  AST  20  /  ALT  11  /  AlkPhos  61  01-13      Thyroid Function Tests:          01-13 Chol -- Direct LDL -- LDL calculated -- HDL -- Trig 73    Radiology:             
CHIEF COMPLAINT:  Fevers    HISTORY OF PRESENT ILLNESS:  89 yo female with PMHx of CAD, DM, HLD, GERD, atrial thrombus , Afib on Eliquis, CHF, AS p/w fever.  Patient unable to provide history.  Per EMS patient has had fevers, body aches and shortness of breath since yesterday.  Patient lives at home alone with her home health aide.  Upon arrival, EMS states patient was sating at 90% on room air with improvement on 2 L nasal cannula. Ptn was admitted with a similar presentation nearly a year ago and was covid pos then as she is now as well.    Cardiology consulted for cardiac management.  Unable to obtain history from pt.  She was easily arousable but lethargic and on BiPAP.      PAST MEDICAL & SURGICAL HISTORY:  Diabetes    Atrial fibrillation    Stented coronary artery    Coronary artery disease    No significant past surgical history    MEDICATIONS:  apixaban 2.5 milliGRAM(s) Oral every 12 hours    remdesivir  IVPB   IV Intermittent   remdesivir  IVPB 200 milliGRAM(s) IV Intermittent every 24 hours    acetaminophen     Tablet .. 650 milliGRAM(s) Oral every 6 hours PRN    dextrose 50% Injectable 25 Gram(s) IV Push once  dextrose 50% Injectable 12.5 Gram(s) IV Push once  dextrose 50% Injectable 25 Gram(s) IV Push once  dextrose Oral Gel 15 Gram(s) Oral once PRN  glucagon  Injectable 1 milliGRAM(s) IntraMuscular once  insulin glargine Injectable (LANTUS) 10 Unit(s) SubCutaneous at bedtime  insulin lispro (ADMELOG) corrective regimen sliding scale   SubCutaneous three times a day before meals    dextrose 5%. 1000 milliLiter(s) IV Continuous <Continuous>  dextrose 5%. 1000 milliLiter(s) IV Continuous <Continuous>  sodium chloride 0.45%. 1000 milliLiter(s) IV Continuous <Continuous>    FAMILY HISTORY:  No pertinent family history in first degree relatives    SOCIAL HISTORY:    [ ] Non-smoker  [ ] Smoker  [ ] Alcohol    Allergies    No Known Allergies    Intolerances    REVIEW OF SYSTEMS:  CONSTITUTIONAL: No fever, weight loss, + fatigue  EYES: No eye pain, visual disturbances, or discharge  ENMT:  No difficulty hearing, tinnitus, vertigo; No sinus or throat pain  NECK: No pain or stiffness  RESPIRATORY: No cough, wheezing, chills or hemoptysis; + Shortness of Breath  CARDIOVASCULAR: No chest pain, palpitations, passing out, dizziness, or leg swelling  GASTROINTESTINAL: No abdominal or epigastric pain. No nausea, vomiting, or hematemesis; No diarrhea or constipation. No melena or hematochezia.  GENITOURINARY: No dysuria, frequency, hematuria, or incontinence  NEUROLOGICAL: No headaches, memory loss, loss of strength, numbness, or tremors  SKIN: No itching, burning, rashes, or lesions   LYMPH Nodes: No enlarged glands  ENDOCRINE: No heat or cold intolerance; No hair loss  MUSCULOSKELETAL: No joint pain or swelling; No muscle, back, or extremity pain  PSYCHIATRIC: No depression, anxiety, mood swings, or difficulty sleeping  HEME/LYMPH: No easy bruising, or bleeding gums  ALLERY AND IMMUNOLOGIC: No hives or eczema	    [ ] All others negative	  [ ] Unable to obtain    PHYSICAL EXAM:  T(C): 36.8 (01-12-23 @ 15:57), Max: 38.7 (01-12-23 @ 10:25)  HR: 99 (01-12-23 @ 16:37) (99 - 132)  BP: 96/70 (01-12-23 @ 15:57) (85/60 - 128/90)  RR: 24 (01-12-23 @ 15:57) (18 - 26)  SpO2: 100% (01-12-23 @ 16:37) (94% - 100%)  Wt(kg): --  I&O's Summary    Appearance: Lethargic  HEENT: dry oral mucosa, PERRL, EOMI	  Lymphatic: No lymphadenopathy  Cardiovascular: Normal S1 S2, No JVD, No murmurs, No edema  Respiratory: Decreased BS, on BiPAP  Psychiatry: A & O x 1, Lethargic  Gastrointestinal: Soft, Non-tender, + BS	  Skin: No rashes, No ecchymoses, No cyanosis	  Neurologic: Non-focal  Extremities: Decreased ROM/Strength, No clubbing, cyanosis or edema  Vascular: Peripheral pulses palpable 2+ bilaterally    TELEMETRY: 	    ECG:  < from: POCUS ED TTE 2D F/U, Limited w/o Cont. (01.12.23 @ 12:41) >  Procedure was performed in the Emergency Department by a credentialed   Emergency Medicine Attending Physician    EXAM:  ER TTE LIMITED      ORDER COMMENTS:      PROCEDURE DATE:  01/12/2023    FOCUSED ED ULTRASOUND REPORT          INTERPRETATION:  A focused transthoracic cardiac ultrasound examination   was performed.  Trace pericardial effusion was present.  No global wall motion abnormality was identified  Tricuspid regurgitation was present  IVC greater than 2cm  Scattered B lines, greater on the right    IMPRESSION:  Trace pericardial effusion  Right ventricular enlargement  Tricuspid regurgitation present    --- End of Report ---    < end of copied text >   	  RADIOLOGY: < from: Xray Chest 1 View- PORTABLE-Urgent (01.12.23 @ 11:11) >  ACC: [id number] EXAM:  XR CHEST PORTABLE URGENT 1V                          PROCEDURE DATE:  01/12/2023          INTERPRETATION:  EXAMINATION: XR CHEST URGENT    CLINICAL INDICATION: Sepsis    TECHNIQUE: Single frontal portable view of the chest from1/12/2023 11:11   AM    COMPARISON: 6/28/2022    FINDINGS:    The heart size is slightly enlarged  Perihilar hazy opacities.  There is no pneumothorax.  Small bilateral pleural effusions.    IMPRESSION:  Perihilar hazy opacities.  Small bilateral pleural effusions.    --- End of Report ---      < end of copied text >    OTHER: 	  	  LABS:	 	    CARDIAC MARKERS:                        11.4   11.79 )-----------( 180      ( 12 Jan 2023 10:21 )             36.0     01-12    136  |  95<L>  |  21  ----------------------------<  278<H>  3.8   |  29  |  0.91    Ca    9.8      12 Jan 2023 10:21    TPro  7.3  /  Alb  3.9  /  TBili  1.2  /  DBili  x   /  AST  25  /  ALT  11  /  AlkPhos  73  01-12    proBNP:   Lipid Profile:   HgA1c:   TSH:

## 2023-01-15 NOTE — CONSULT NOTE ADULT - PROBLEM SELECTOR RECOMMENDATION 2
On medications,  no chest pain, stable, monitored and followed up by primary  team/cardiology team
tachycardic but improving     - febrile    - BPs soft - IVF as ordered  PRN cardizem IVP if persistent tachycardia >120 bpm as long as BP can tolerate  c/w eliquis as ordered  monitor on tele

## 2023-01-15 NOTE — PROGRESS NOTE ADULT - SUBJECTIVE AND OBJECTIVE BOX
Follow-up Pulm Progress Note    Oxygenating better.     Medications:  MEDICATIONS  (STANDING):  apixaban 2.5 milliGRAM(s) Oral every 12 hours  benzonatate 100 milliGRAM(s) Oral three times a day  chlorhexidine 2% Cloths 1 Application(s) Topical <User Schedule>  dexAMETHasone  Injectable 6 milliGRAM(s) IV Push daily  dextrose 5%. 1000 milliLiter(s) (50 mL/Hr) IV Continuous <Continuous>  dextrose 5%. 1000 milliLiter(s) (100 mL/Hr) IV Continuous <Continuous>  dextrose 50% Injectable 25 Gram(s) IV Push once  dextrose 50% Injectable 12.5 Gram(s) IV Push once  dextrose 50% Injectable 25 Gram(s) IV Push once  glucagon  Injectable 1 milliGRAM(s) IntraMuscular once  insulin glargine Injectable (LANTUS) 7 Unit(s) SubCutaneous at bedtime  insulin lispro (ADMELOG) corrective regimen sliding scale   SubCutaneous three times a day before meals  insulin lispro (ADMELOG) corrective regimen sliding scale   SubCutaneous at bedtime  insulin lispro Injectable (ADMELOG) 4 Unit(s) SubCutaneous three times a day before meals  remdesivir  IVPB 100 milliGRAM(s) IV Intermittent every 24 hours  remdesivir  IVPB   IV Intermittent   sodium chloride 0.45%. 1000 milliLiter(s) (60 mL/Hr) IV Continuous <Continuous>    MEDICATIONS  (PRN):  acetaminophen     Tablet .. 650 milliGRAM(s) Oral every 6 hours PRN Temp greater or equal to 38C (100.4F), Mild Pain (1 - 3)  dextrose Oral Gel 15 Gram(s) Oral once PRN Blood Glucose LESS THAN 70 milliGRAM(s)/deciliter  guaiFENesin Oral Liquid (Sugar-Free) 200 milliGRAM(s) Oral every 6 hours PRN Cough    Vital Signs Last 24 Hrs  T(C): 37.2 (15 Tanner 2023 10:58), Max: 37.2 (15 Tanner 2023 10:58)  T(F): 98.9 (15 Tanner 2023 10:58), Max: 98.9 (15 Tanner 2023 10:58)  HR: 79 (15 Tanner 2023 10:58) (62 - 84)  BP: 150/90 (15 Tanner 2023 10:58) (129/88 - 150/90)  BP(mean): --  RR: 18 (15 Tanner 2023 10:58) (18 - 18)  SpO2: 100% (15 Tanner 2023 10:58) (93% - 100%)    Parameters below as of 15 Tanner 2023 10:58  Patient On (Oxygen Delivery Method): nasal cannula  O2 Flow (L/min): 4    01-14 @ 07:01  -  01-15 @ 07:00  --------------------------------------------------------  IN: 650 mL / OUT: 300 mL / NET: 350 mL    LABS:    CAPILLARY BLOOD GLUCOSE      POCT Blood Glucose.: 296 mg/dL (15 Tanner 2023 11:24)    Procalcitonin, Serum: 6.13 ng/mL (01-13-23 @ 06:42)    Serum Pro-Brain Natriuretic Peptide: 97454 pg/mL (01-13-23 @ 11:35)    CULTURES: (if applicable)    Culture - Blood (collected 01-12-23 @ 09:50)  Source: .Blood Blood-Peripheral  Preliminary Report (01-13-23 @ 15:02):    No growth to date.    Culture - Blood (collected 01-12-23 @ 09:35)  Source: .Blood Blood-Peripheral  Preliminary Report (01-13-23 @ 15:02):    No growth to date.        Culture - Urine (collected 01-12-23 @ 10:32)  Source: Clean Catch Clean Catch (Midstream)  Final Report (01-13-23 @ 11:01):    No growth    Physical Examination:  PULM: Decreased BS at bases  CVS: S1, S2 heard    RADIOLOGY REVIEWED  CXR:     CT chest:    TTE:

## 2023-01-15 NOTE — PROGRESS NOTE ADULT - SUBJECTIVE AND OBJECTIVE BOX
Subjective: Patient seen and examined. No new events except as noted.     REVIEW OF SYSTEMS:    CONSTITUTIONAL:+ weakness, fevers or chills  EYES/ENT: No visual changes;  No vertigo or throat pain   NECK: No pain or stiffness  RESPIRATORY: No cough, wheezing, hemoptysis; No shortness of breath  CARDIOVASCULAR: No chest pain or palpitations  GASTROINTESTINAL: No abdominal or epigastric pain. No nausea, vomiting, or hematemesis; No diarrhea or constipation. No melena or hematochezia.  GENITOURINARY: No dysuria, frequency or hematuria  NEUROLOGICAL: No numbness or weakness  SKIN: No itching, burning, rashes, or lesions   All other review of systems is negative unless indicated above.    MEDICATIONS:  MEDICATIONS  (STANDING):  apixaban 2.5 milliGRAM(s) Oral every 12 hours  benzonatate 100 milliGRAM(s) Oral three times a day  chlorhexidine 2% Cloths 1 Application(s) Topical <User Schedule>  dexAMETHasone  Injectable 6 milliGRAM(s) IV Push daily  dextrose 5%. 1000 milliLiter(s) (50 mL/Hr) IV Continuous <Continuous>  dextrose 5%. 1000 milliLiter(s) (100 mL/Hr) IV Continuous <Continuous>  dextrose 50% Injectable 25 Gram(s) IV Push once  dextrose 50% Injectable 12.5 Gram(s) IV Push once  dextrose 50% Injectable 25 Gram(s) IV Push once  glucagon  Injectable 1 milliGRAM(s) IntraMuscular once  insulin glargine Injectable (LANTUS) 10 Unit(s) SubCutaneous at bedtime  insulin lispro (ADMELOG) corrective regimen sliding scale   SubCutaneous three times a day before meals  insulin lispro (ADMELOG) corrective regimen sliding scale   SubCutaneous at bedtime  remdesivir  IVPB 100 milliGRAM(s) IV Intermittent every 24 hours  remdesivir  IVPB   IV Intermittent   sodium chloride 0.45%. 1000 milliLiter(s) (60 mL/Hr) IV Continuous <Continuous>      PHYSICAL EXAM:  T(C): 36.4 (01-15-23 @ 05:26), Max: 36.8 (01-14-23 @ 14:21)  HR: 62 (01-15-23 @ 09:08) (62 - 84)  BP: 136/80 (01-15-23 @ 05:26) (121/80 - 143/79)  RR: 18 (01-15-23 @ 05:26) (18 - 18)  SpO2: 100% (01-15-23 @ 09:08) (93% - 110%)  Wt(kg): --  I&O's Summary    14 Jan 2023 07:01  -  15 Tanner 2023 07:00  --------------------------------------------------------  IN: 650 mL / OUT: 300 mL / NET: 350 mL    15 Tanner 2023 07:01  -  15 Tanner 2023 09:47  --------------------------------------------------------  IN: 200 mL / OUT: 0 mL / NET: 200 mL          Appearance: Normal	  HEENT:   Normal oral mucosa, PERRL, EOMI	  Lymphatic: No lymphadenopathy , no edema  Cardiovascular: Normal S1 S2, No JVD, No murmurs , Peripheral pulses palpable 2+ bilaterally  Respiratory: Lungs clear to auscultation, normal effort 	  Gastrointestinal:  Soft, Non-tender, + BS	  Skin: No rashes, No ecchymoses, No cyanosis, warm to touch  Musculoskeletal: Normal range of motion, normal strength  Psychiatry:  Mood & affect appropriate  Ext: No edema      LABS:    CARDIAC MARKERS:                                10.8   8.60  )-----------( 169      ( 13 Jan 2023 11:35 )             33.8     01-13    136  |  97  |  23  ----------------------------<  240<H>  4.3   |  26  |  0.79    Ca    9.7      13 Jan 2023 11:35    TPro  6.9  /  Alb  3.5  /  TBili  0.8  /  DBili  0.2  /  AST  20  /  ALT  11  /  AlkPhos  61  01-13    proBNP:   Lipid Profile:   HgA1c:   TSH:     1          TELEMETRY: 	    ECG:  	  RADIOLOGY:   DIAGNOSTIC TESTING:  [ ] Echocardiogram:  [ ]  Catheterization:  [ ] Stress Test:    OTHER:

## 2023-01-15 NOTE — CONSULT NOTE ADULT - PROBLEM SELECTOR RECOMMENDATION 3
check TTE    - appears stable
Suggest to continue medications, monitoring, FU primary team recommendations. .

## 2023-01-15 NOTE — PROGRESS NOTE ADULT - SUBJECTIVE AND OBJECTIVE BOX
Patient is a 89y old  Female who presents with a chief complaint of resp failure (13 Jan 2023 18:37)      SUBJECTIVE / OVERNIGHT EVENTS: tolerating 2lO2NC, overall much better    MEDICATIONS  (STANDING):  apixaban 2.5 milliGRAM(s) Oral every 12 hours  benzonatate 100 milliGRAM(s) Oral three times a day  chlorhexidine 2% Cloths 1 Application(s) Topical <User Schedule>  dextrose 5%. 1000 milliLiter(s) (50 mL/Hr) IV Continuous <Continuous>  dextrose 5%. 1000 milliLiter(s) (100 mL/Hr) IV Continuous <Continuous>  dextrose 50% Injectable 25 Gram(s) IV Push once  dextrose 50% Injectable 12.5 Gram(s) IV Push once  dextrose 50% Injectable 25 Gram(s) IV Push once  glucagon  Injectable 1 milliGRAM(s) IntraMuscular once  insulin glargine Injectable (LANTUS) 7 Unit(s) SubCutaneous at bedtime  insulin lispro (ADMELOG) corrective regimen sliding scale   SubCutaneous three times a day before meals  insulin lispro (ADMELOG) corrective regimen sliding scale   SubCutaneous at bedtime  insulin lispro Injectable (ADMELOG) 4 Unit(s) SubCutaneous three times a day before meals  remdesivir  IVPB 100 milliGRAM(s) IV Intermittent every 24 hours  remdesivir  IVPB   IV Intermittent   sodium chloride 0.45%. 1000 milliLiter(s) (60 mL/Hr) IV Continuous <Continuous>    MEDICATIONS  (PRN):  acetaminophen     Tablet .. 650 milliGRAM(s) Oral every 6 hours PRN Temp greater or equal to 38C (100.4F), Mild Pain (1 - 3)  dextrose Oral Gel 15 Gram(s) Oral once PRN Blood Glucose LESS THAN 70 milliGRAM(s)/deciliter  guaiFENesin Oral Liquid (Sugar-Free) 200 milliGRAM(s) Oral every 6 hours PRN Cough      Vital Signs Last 24 Hrs  T(F): 98.8 (01-15-23 @ 20:46), Max: 98.9 (01-15-23 @ 10:58)  HR: 83 (01-15-23 @ 20:46) (62 - 85)  BP: 132/87 (01-15-23 @ 20:46) (129/88 - 150/90)  RR: 18 (01-15-23 @ 20:46) (18 - 18)  SpO2: 96% (01-15-23 @ 20:46) (91% - 100%)  Telemetry:   CAPILLARY BLOOD GLUCOSE      POCT Blood Glucose.: 323 mg/dL (15 Tanner 2023 17:18)  POCT Blood Glucose.: 296 mg/dL (15 Tanner 2023 11:24)  POCT Blood Glucose.: 231 mg/dL (15 Tanner 2023 07:47)  POCT Blood Glucose.: 378 mg/dL (14 Jan 2023 21:35)    I&O's Summary    14 Jan 2023 07:01  -  15 Tanner 2023 07:00  --------------------------------------------------------  IN: 650 mL / OUT: 300 mL / NET: 350 mL    15 Tanner 2023 07:01  -  15 Tanner 2023 21:08  --------------------------------------------------------  IN: 1120 mL / OUT: 500 mL / NET: 620 mL        PHYSICAL EXAM:  GENERAL: NAD, well-developed  HEAD:  Atraumatic, Normocephalic  EYES: EOMI, PERRLA, conjunctiva and sclera clear  NECK: Supple, No JVD  CHEST/LUNG: Clear to auscultation bilaterally; No wheeze  HEART: Regular rate and rhythm; No murmurs, rubs, or gallops  ABDOMEN: Soft, Nontender, Nondistended; Bowel sounds present  EXTREMITIES:  2+ Peripheral Pulses, No clubbing, cyanosis, or edema  PSYCH: AAOx3  NEUROLOGY: non-focal  SKIN: No rashes or lesions    LABS:                    RADIOLOGY & ADDITIONAL TESTS:    Imaging Personally Reviewed:    Consultant(s) Notes Reviewed:      Care Discussed with Consultants/Other Providers:

## 2023-01-15 NOTE — PROGRESS NOTE ADULT - ASSESSMENT
89 yo female with PMHx of CAD, DM, HLD, GERD, atrial thrombus , Afib on Eliquis, CHF, AS p/w fever.  Patient unable to provide history.  Per EMS patient has had fevers, body aches and shortness of breath since yesterday.  Patient lives at home alone with her home health aide.  Upon arrival, EMS states patient was sating at 90% on room air with improvement on 2 L nasal cannula. Ptn was admitted with a similar presentation nearly a year ago and was covid pos then as she is now as well. She is DNR/DNI as per her HCP  Will treat w remdesivir, dexamethasone, insulin on a sliding scale, IVF, ID, pulm consults called. dvt ppx w po Xarelto;    Covid infection: / resp failure   A fib  :  CHF:  Aortic stenosis:   DM:   GERD:   cad     1/13/2023      Covid infection: / resp failure  : ON REMDESIVIR AND DEXA:  SHE HAD COVID TWO YEARS AGO ALSO : ON BIPAP:  She is on empirically for bacterial infection:   A fib  : on eliquis  CHF:  per primary team : she has heart failure:  her legs were very swollen before two weeks ago per her neice  Aortic stenosis: has sever AS:  didb not do any treatment / tavr:    DM:  monitor and control   GERD: PPI  cad  : cont current meds::  on eliquis    dvt propahylaxis    1/14:      Covid infection: / resp failure  : ON REMDESIVIR AND DEXA:  She looks pretty good today  : o n 4 L of oxygen : she is alert and awake and reponds to questions  A fib  : on eliquis  CHF:  per primary team : she has heart failure:  her legs were very swollen before two weeks ago per her neice : not on diuretics at this t mulugeta  Aortic stenosis: has sever AS:  did  not do any treatment    DM:  monitor and control   GERD: PPI  cad  : cont current meds::  on eliquis:  dw acp     dvt prophylaxis    1/15 Wean O2. Bilevel PRN, d/c tomorrow if she doesn't use. Control FS.

## 2023-01-15 NOTE — CONSULT NOTE ADULT - PROBLEM SELECTOR RECOMMENDATION 9
Will start Lantus 7 units at bed time.  Will start Admelog 4 units before each meal in addition to Admelog correction scale coverage.  Patient counseled for compliance with consistent low carb diet.  .
w/ fevers    - COVID +  remdesivir and decadron started  on biPAP  titrate/wean supplemental O2 as needed

## 2023-01-15 NOTE — PROGRESS NOTE ADULT - ASSESSMENT
89 yo female with PMHx of CAD, DM, HLD, GERD, atrial thrombus , Afib on Eliquis, CHF, AS p/w fever.  Patient unable to provide history.  Per EMS patient has had fevers, body aches and shortness of breath since yesterday.  Patient lives at home alone with her home health aide.     1/12:  Upon arrival, EMS states patient was sating at 90% on room air with improvement on 2 L nasal cannula. Ptn was admitted with a similar presentation nearly a year ago and was covid pos then as she is now as well. She is DNR/DNI as per her HCP  Will treat w remdesivir, dexamethasone, insulin on a sliding scale, IVF, ID, pulm consults called. dvt ppx not necessary, ptn is on chronic AC w ELiquis    1/13: ptn is dyspneic, has a lot of secretions, remains on BIPAP, seen by PULM, ID, Card, on chroninc AC, DDImer >11K, Procal >1, on steroids, remdesivir, day 2 hospitalization for acute hypoxic respiratory failure 2/2 COvid PNA. ptn is DNR/DNI as per HCP. will add Zosyn, ID in agreement, will add respiratory vest for secretion mobilization    1/14: ptn feels much better, on 4 l O2 NC, high FS while on decadron, ENDO called, pulm and ID following. cont remdesivir, decadron zosyn. check speech and swallow eval, keep on dysphagia 2 diet for now,   1/15: tolerating 2LNC, overall feeling much better

## 2023-01-15 NOTE — CONSULT NOTE ADULT - ASSESSMENT
Assessment  DMT2: 89y Female with DM T2 with hyperglycemia admitted with fever,  A1C is 6.6, patient was on oral hypoglycemic agents at home, now on insulin coverage, blood sugars running high, eating meals.  COVID: On treatment monitored.  CAD: on medications, monitored, asymptomatic.  HTN: On antihypertensive medications, monitored, asymptomatic.              Jovany Pagan MD  Cell:  [phone]  Office: [phone]

## 2023-01-16 LAB
A-TUMOR NECROSIS FACT SERPL-MCNC: <1.7 PG/ML — SIGNIFICANT CHANGE UP
CK SERPL-CCNC: 18 U/L — LOW (ref 25–170)
CRP SERPL-MCNC: 34 MG/L — HIGH (ref 0–4)
D DIMER BLD IA.RAPID-MCNC: 282 NG/ML DDU — HIGH
FERRITIN SERPL-MCNC: 105 NG/ML — SIGNIFICANT CHANGE UP (ref 15–150)
FERRITIN SERPL-MCNC: 153 NG/ML — HIGH (ref 15–150)
FIBRINOGEN PPP-MCNC: 334 MG/DL — SIGNIFICANT CHANGE UP (ref 200–445)
GLUCOSE BLDC GLUCOMTR-MCNC: 131 MG/DL — HIGH (ref 70–99)
GLUCOSE BLDC GLUCOMTR-MCNC: 173 MG/DL — HIGH (ref 70–99)
GLUCOSE BLDC GLUCOMTR-MCNC: 185 MG/DL — HIGH (ref 70–99)
GLUCOSE BLDC GLUCOMTR-MCNC: 223 MG/DL — HIGH (ref 70–99)
IL10 SERPL-MCNC: 8.7 PG/ML — HIGH
IL12 SERPL-MCNC: <1.9 PG/ML — SIGNIFICANT CHANGE UP
IL13 SERPL-MCNC: <1.7 PG/ML — SIGNIFICANT CHANGE UP
IL17A SERPL-MCNC: <1.4 PG/ML — SIGNIFICANT CHANGE UP
IL2 SERPL-MCNC: 1563.2 PG/ML — HIGH (ref 175.3–858.2)
IL2 SERPL-MCNC: <2.1 PG/ML — SIGNIFICANT CHANGE UP
IL4 SERPL-MCNC: <2.2 PG/ML — SIGNIFICANT CHANGE UP
IL6 SERPL-MCNC: 3.5 PG/ML — HIGH
IL8 SERPL-MCNC: <3 PG/ML — SIGNIFICANT CHANGE UP
INTERFERON GAMMA, BLOOD: <4.2 PG/ML — SIGNIFICANT CHANGE UP
INTERLEUKIN 1 BETA: <6.5 PG/ML — SIGNIFICANT CHANGE UP
INTERLEUKIN 5: <2.1 PG/ML — SIGNIFICANT CHANGE UP
LDH SERPL L TO P-CCNC: 177 U/L — SIGNIFICANT CHANGE UP (ref 50–242)
NT-PROBNP SERPL-SCNC: HIGH PG/ML (ref 0–300)
T4 FREE SERPL-MCNC: 1.2 NG/DL — SIGNIFICANT CHANGE UP (ref 0.9–1.8)
TSH SERPL-MCNC: 1.71 UIU/ML — SIGNIFICANT CHANGE UP (ref 0.27–4.2)

## 2023-01-16 RX ORDER — INSULIN GLARGINE 100 [IU]/ML
10 INJECTION, SOLUTION SUBCUTANEOUS AT BEDTIME
Refills: 0 | Status: DISCONTINUED | OUTPATIENT
Start: 2023-01-16 | End: 2023-01-18

## 2023-01-16 RX ORDER — INSULIN LISPRO 100/ML
5 VIAL (ML) SUBCUTANEOUS
Refills: 0 | Status: DISCONTINUED | OUTPATIENT
Start: 2023-01-16 | End: 2023-01-18

## 2023-01-16 RX ADMIN — APIXABAN 2.5 MILLIGRAM(S): 2.5 TABLET, FILM COATED ORAL at 06:26

## 2023-01-16 RX ADMIN — Medication 1: at 07:42

## 2023-01-16 RX ADMIN — Medication 5 UNIT(S): at 11:39

## 2023-01-16 RX ADMIN — APIXABAN 2.5 MILLIGRAM(S): 2.5 TABLET, FILM COATED ORAL at 17:22

## 2023-01-16 RX ADMIN — Medication 100 MILLIGRAM(S): at 13:42

## 2023-01-16 RX ADMIN — Medication 6 MILLIGRAM(S): at 06:25

## 2023-01-16 RX ADMIN — Medication 5 UNIT(S): at 07:42

## 2023-01-16 RX ADMIN — SODIUM CHLORIDE 60 MILLILITER(S): 9 INJECTION, SOLUTION INTRAVENOUS at 17:24

## 2023-01-16 RX ADMIN — Medication 100 MILLIGRAM(S): at 21:48

## 2023-01-16 RX ADMIN — Medication 5 UNIT(S): at 16:13

## 2023-01-16 RX ADMIN — CHLORHEXIDINE GLUCONATE 1 APPLICATION(S): 213 SOLUTION TOPICAL at 06:25

## 2023-01-16 RX ADMIN — SODIUM CHLORIDE 60 MILLILITER(S): 9 INJECTION, SOLUTION INTRAVENOUS at 21:48

## 2023-01-16 RX ADMIN — Medication 25 MILLIGRAM(S): at 06:26

## 2023-01-16 RX ADMIN — Medication 1: at 16:13

## 2023-01-16 RX ADMIN — Medication 100 MILLIGRAM(S): at 06:26

## 2023-01-16 RX ADMIN — INSULIN GLARGINE 10 UNIT(S): 100 INJECTION, SOLUTION SUBCUTANEOUS at 21:48

## 2023-01-16 RX ADMIN — REMDESIVIR 200 MILLIGRAM(S): 5 INJECTION INTRAVENOUS at 17:24

## 2023-01-16 NOTE — PHYSICAL THERAPY INITIAL EVALUATION ADULT - ADDITIONAL COMMENTS
pt lives alone in private house, 4 steps to enter. pt has HHA 10h/7d (questionable reliability on hours). pt states amb with rolling walker

## 2023-01-16 NOTE — PROGRESS NOTE ADULT - ASSESSMENT
89 yo female with PMHx of CAD, DM, HLD, GERD, atrial thrombus , Afib on Eliquis, CHF, AS p/w fever.  Patient unable to provide history.  Per EMS patient has had fevers, body aches and shortness of breath since yesterday.  Patient lives at home alone with her home health aide.     1/12:  Upon arrival, EMS states patient was sating at 90% on room air with improvement on 2 L nasal cannula. Ptn was admitted with a similar presentation nearly a year ago and was covid pos then as she is now as well. She is DNR/DNI as per her HCP  Will treat w remdesivir, dexamethasone, insulin on a sliding scale, IVF, ID, pulm consults called. dvt ppx not necessary, ptn is on chronic AC w ELiquis    1/13: ptn is dyspneic, has a lot of secretions, remains on BIPAP, seen by PULM, ID, Card, on chroninc AC, DDImer >11K, Procal >1, on steroids, remdesivir, day 2 hospitalization for acute hypoxic respiratory failure 2/2 COvid PNA. ptn is DNR/DNI as per HCP. will add Zosyn, ID in agreement, will add respiratory vest for secretion mobilization    1/14: ptn feels much better, on 4 l O2 NC, high FS while on decadron, ENDO called, pulm and ID following. cont remdesivir, decadron zosyn. check speech and swallow eval, keep on dysphagia 2 diet for now,   1/15: tolerating 2LNC, overall feeling much better  1/16: RA pulse ox 95%, awaiting pulse ox w exertion

## 2023-01-16 NOTE — PROGRESS NOTE ADULT - SUBJECTIVE AND OBJECTIVE BOX
Subjective: Patient seen and examined. No new events except as noted.   Was sleeping but easily arousable.  Remains on 2L NC.     REVIEW OF SYSTEMS:    CONSTITUTIONAL: + weakness, fevers or chills  EYES/ENT: No visual changes;  No vertigo or throat pain   NECK: No pain or stiffness  RESPIRATORY: No cough, wheezing, hemoptysis; No shortness of breath  CARDIOVASCULAR: No chest pain or palpitations  GASTROINTESTINAL: No abdominal or epigastric pain. No nausea, vomiting, or hematemesis; No diarrhea or constipation. No melena or hematochezia.  GENITOURINARY: No dysuria, frequency or hematuria  NEUROLOGICAL: No numbness or weakness  SKIN: No itching, burning, rashes, or lesions   All other review of systems is negative unless indicated above.    MEDICATIONS:  MEDICATIONS  (STANDING):  apixaban 2.5 milliGRAM(s) Oral every 12 hours  benzonatate 100 milliGRAM(s) Oral three times a day  chlorhexidine 2% Cloths 1 Application(s) Topical <User Schedule>  dexAMETHasone  Injectable 6 milliGRAM(s) IV Push daily  dextrose 5%. 1000 milliLiter(s) (50 mL/Hr) IV Continuous <Continuous>  dextrose 5%. 1000 milliLiter(s) (100 mL/Hr) IV Continuous <Continuous>  dextrose 50% Injectable 25 Gram(s) IV Push once  dextrose 50% Injectable 12.5 Gram(s) IV Push once  dextrose 50% Injectable 25 Gram(s) IV Push once  glucagon  Injectable 1 milliGRAM(s) IntraMuscular once  insulin glargine Injectable (LANTUS) 10 Unit(s) SubCutaneous at bedtime  insulin lispro (ADMELOG) corrective regimen sliding scale   SubCutaneous three times a day before meals  insulin lispro (ADMELOG) corrective regimen sliding scale   SubCutaneous at bedtime  remdesivir  IVPB 100 milliGRAM(s) IV Intermittent every 24 hours  remdesivir  IVPB   IV Intermittent   sodium chloride 0.45%. 1000 milliLiter(s) (60 mL/Hr) IV Continuous <Continuous>    PHYSICAL EXAM:  Vital Signs Last 24 Hrs  T(C): 36.4 (16 Jan 2023 05:21), Max: 37.2 (15 Tanner 2023 10:58)  T(F): 97.5 (16 Jan 2023 05:21), Max: 98.9 (15 Tanner 2023 10:58)  HR: 74 (16 Jan 2023 08:52) (62 - 85)  BP: 129/80 (16 Jan 2023 05:21) (129/80 - 150/90)  BP(mean): --  RR: 18 (16 Jan 2023 08:51) (18 - 18)  SpO2: 100% (16 Jan 2023 08:52) (91% - 100%)    Parameters below as of 16 Jan 2023 08:51  Patient On (Oxygen Delivery Method): nasal cannula  O2 Flow (L/min): 2    I&O's Summary    15 Tanner 2023 07:01  -  16 Jan 2023 07:00  --------------------------------------------------------  IN: 1480 mL / OUT: 800 mL / NET: 680 mL    Appearance: Normal	  HEENT: Normal oral mucosa, PERRL, EOMI	  Lymphatic: No lymphadenopathy , no edema  Cardiovascular: Irregular S1 S2, No JVD, No murmurs , Peripheral pulses palpable 2+ bilaterally  Respiratory: Decreased BS, on NC  Gastrointestinal: Soft, Non-tender, + BS	  Skin: No rashes, No ecchymoses, No cyanosis, warm to touch  Musculoskeletal: Normal range of motion, normal strength  Psychiatry: Mood & affect appropriate  Ext: No edema    LABS:    CARDIAC MARKERS:    proBNP:   Lipid Profile:   HgA1c:   TSH:     TELEMETRY: Afib 40-90  ECG:  	  RADIOLOGY:   DIAGNOSTIC TESTING:  [ ] Echocardiogram:  [ ]  Catheterization:  [ ] Stress Test:    OTHER:

## 2023-01-16 NOTE — PHYSICAL THERAPY INITIAL EVALUATION ADULT - PERTINENT HX OF CURRENT PROBLEM, REHAB EVAL
89 yo female with PMHx of CAD, DM, HLD, GERD, atrial thrombus , Afib on Eliquis, CHF, AS p/w fever.  Patient unable to provide history.  Per EMS patient has had fevers, body aches and shortness of breath since yesterday.  Patient lives at home alone with her home health aide.  Upon arrival, EMS states patient was sating at 90% on room air with improvement on 2 L nasal cannula. Ptn was admitted with a similar presentation nearly a year ago and was covid pos then as she is now as well. CT chest:Intralobular septal thickening, cardiomegaly, scattered ground glass opacities with pleural effusions, suggestive of pulmonary edema.

## 2023-01-16 NOTE — PROGRESS NOTE ADULT - SUBJECTIVE AND OBJECTIVE BOX
Date of Service: 01-16-23 @ 12:38    Patient is a 89y old  Female who presents with a chief complaint of resp failure (13 Jan 2023 18:37)      Any change in ROS: she is alert and awake on 2 L of oxygen today  : no sob:  she is alert and awake and responds to simple questions:     MEDICATIONS  (STANDING):  apixaban 2.5 milliGRAM(s) Oral every 12 hours  benzonatate 100 milliGRAM(s) Oral three times a day  chlorhexidine 2% Cloths 1 Application(s) Topical <User Schedule>  dexAMETHasone     Tablet 6 milliGRAM(s) Oral daily  dextrose 5%. 1000 milliLiter(s) (50 mL/Hr) IV Continuous <Continuous>  dextrose 5%. 1000 milliLiter(s) (100 mL/Hr) IV Continuous <Continuous>  dextrose 50% Injectable 25 Gram(s) IV Push once  dextrose 50% Injectable 12.5 Gram(s) IV Push once  dextrose 50% Injectable 25 Gram(s) IV Push once  glucagon  Injectable 1 milliGRAM(s) IntraMuscular once  insulin glargine Injectable (LANTUS) 10 Unit(s) SubCutaneous at bedtime  insulin lispro (ADMELOG) corrective regimen sliding scale   SubCutaneous three times a day before meals  insulin lispro (ADMELOG) corrective regimen sliding scale   SubCutaneous at bedtime  insulin lispro Injectable (ADMELOG) 5 Unit(s) SubCutaneous three times a day before meals  remdesivir  IVPB 100 milliGRAM(s) IV Intermittent every 24 hours  remdesivir  IVPB   IV Intermittent   sodium chloride 0.45%. 1000 milliLiter(s) (60 mL/Hr) IV Continuous <Continuous>    MEDICATIONS  (PRN):  acetaminophen     Tablet .. 650 milliGRAM(s) Oral every 6 hours PRN Temp greater or equal to 38C (100.4F), Mild Pain (1 - 3)  dextrose Oral Gel 15 Gram(s) Oral once PRN Blood Glucose LESS THAN 70 milliGRAM(s)/deciliter  guaiFENesin Oral Liquid (Sugar-Free) 200 milliGRAM(s) Oral every 6 hours PRN Cough    Vital Signs Last 24 Hrs  T(C): 36.6 (16 Jan 2023 10:59), Max: 37.1 (15 Tanner 2023 20:46)  T(F): 97.8 (16 Jan 2023 10:59), Max: 98.8 (15 Tanner 2023 20:46)  HR: 64 (16 Jan 2023 10:59) (64 - 85)  BP: 115/73 (16 Jan 2023 10:59) (115/73 - 135/92)  BP(mean): --  RR: 18 (16 Jan 2023 10:59) (18 - 18)  SpO2: 95% (16 Jan 2023 10:59) (91% - 100%)    Parameters below as of 16 Jan 2023 10:59  Patient On (Oxygen Delivery Method): nasal cannula  O2 Flow (L/min): 2      I&O's Summary    15 Tanner 2023 07:01  -  16 Jan 2023 07:00  --------------------------------------------------------  IN: 1480 mL / OUT: 800 mL / NET: 680 mL    16 Jan 2023 07:01  -  16 Jan 2023 12:38  --------------------------------------------------------  IN: 240 mL / OUT: 0 mL / NET: 240 mL          Physical Exam:   GENERAL: NAD, well-groomed, well-developed  HEENT: LORNA/   Atraumatic, Normocephalic  ENMT: No tonsillar erythema, exudates, or enlargement; Moist mucous membranes, Good dentition, No lesions  NECK: Supple, No JVD, Normal thyroid  CHEST/LUNG: Scattered crackles bilaterally':   CVS: Regular rate and rhythm; No murmurs, rubs, or gallops  GI: : Soft, Nontender, Nondistended; Bowel sounds present  NERVOUS SYSTEM:  Alert & awake  EXTREMITIES:- edema  LYMPH: No lymphadenopathy noted  SKIN: No rashes or lesions  ENDOCRINOLOGY: No Thyromegaly  PSYCH: calm     Labs:  32  CARDIAC MARKERS ( 16 Jan 2023 06:54 )  x     / x     / 18 U/L / x     / x                                10.8   8.60  )-----------( 169      ( 13 Jan 2023 11:35 )             33.8     01-13    136  |  97  |  23  ----------------------------<  240<H>  4.3   |  26  |  0.79      TPro  6.9  /  Alb  3.5  /  TBili  0.8  /  DBili  0.2  /  AST  20  /  ALT  11  /  AlkPhos  61  01-13    CAPILLARY BLOOD GLUCOSE      POCT Blood Glucose.: 131 mg/dL (16 Jan 2023 11:27)  POCT Blood Glucose.: 173 mg/dL (16 Jan 2023 07:20)  POCT Blood Glucose.: 253 mg/dL (15 Tanner 2023 21:20)  POCT Blood Glucose.: 323 mg/dL (15 Tanner 2023 17:18)            D-Dimer Assay, Quantitative: 282 ng/mL DDU (01-16 @ 06:52)  D-Dimer Assay, Quantitative: 395 ng/mL DDU (01-13 @ 11:35)  Procalcitonin, Serum: 6.13 ng/mL (01-13 @ 06:42)  Serum Pro-Brain Natriuretic Peptide: 35723 pg/mL (01-16 @ 06:54)        RECENT CULTURES:  01-12 @ 10:32 Clean Catch Clean Catch (Midstream)            rad< from: CT Chest No Cont (01.14.23 @ 15:14) >  calcifications.    AIRWAYS/LUNGS/PLEURA: Scattered bilateral patchy groundglass opacities.   Bilateral interlobular septal thickening. Trace bilateral pleural   effusions, right greater than left..    UPPER ABDOMEN: Grossly unremarkable..    BONES/SOFT TISSUES: Degenerative changes.    IMPRESSION:  Intralobular septal thickening, cardiomegaly, scattered ground glass   opacities with pleural effusions, suggestive of pulmonary edema.    --- End of Report ---      < end of copied text >      No growth    01-12 @ 09:50 .Blood Blood-Peripheral                No growth to date.    01-12 @ 09:35 .Blood Blood-Peripheral                No growth to date.          RESPIRATORY CULTURES:          Studies  Chest X-RAY  CT SCAN Chest   Venous Dopplers: LE:   CT Abdomen  Others

## 2023-01-16 NOTE — PROGRESS NOTE ADULT - SUBJECTIVE AND OBJECTIVE BOX
Chief complaint    Patient is a 89y old  Female who presents with a chief complaint of resp failure (13 Jan 2023 18:37)   Review of systems  Patient in bed, appears comfortable.    Labs and Fingersticks  CAPILLARY BLOOD GLUCOSE      POCT Blood Glucose.: 185 mg/dL (16 Jan 2023 16:10)  POCT Blood Glucose.: 131 mg/dL (16 Jan 2023 11:27)  POCT Blood Glucose.: 173 mg/dL (16 Jan 2023 07:20)  POCT Blood Glucose.: 253 mg/dL (15 Tanner 2023 21:20)  POCT Blood Glucose.: 323 mg/dL (15 Tanner 2023 17:18)        Medications  MEDICATIONS  (STANDING):  apixaban 2.5 milliGRAM(s) Oral every 12 hours  benzonatate 100 milliGRAM(s) Oral three times a day  chlorhexidine 2% Cloths 1 Application(s) Topical <User Schedule>  dexAMETHasone     Tablet 6 milliGRAM(s) Oral daily  dextrose 5%. 1000 milliLiter(s) (50 mL/Hr) IV Continuous <Continuous>  dextrose 5%. 1000 milliLiter(s) (100 mL/Hr) IV Continuous <Continuous>  dextrose 50% Injectable 25 Gram(s) IV Push once  dextrose 50% Injectable 12.5 Gram(s) IV Push once  dextrose 50% Injectable 25 Gram(s) IV Push once  glucagon  Injectable 1 milliGRAM(s) IntraMuscular once  insulin glargine Injectable (LANTUS) 10 Unit(s) SubCutaneous at bedtime  insulin lispro (ADMELOG) corrective regimen sliding scale   SubCutaneous three times a day before meals  insulin lispro (ADMELOG) corrective regimen sliding scale   SubCutaneous at bedtime  insulin lispro Injectable (ADMELOG) 5 Unit(s) SubCutaneous three times a day before meals  remdesivir  IVPB 100 milliGRAM(s) IV Intermittent every 24 hours  remdesivir  IVPB   IV Intermittent   sodium chloride 0.45%. 1000 milliLiter(s) (60 mL/Hr) IV Continuous <Continuous>      Physical Exam  General: Patient appears comfortable.  Vital Signs Last 12 Hrs  T(F): 97.8 (01-16-23 @ 10:59), Max: 97.8 (01-16-23 @ 10:59)  HR: 64 (01-16-23 @ 10:59) (64 - 80)  BP: 117/77 (01-16-23 @ 12:53) (115/73 - 129/80)  BP(mean): --  RR: 18 (01-16-23 @ 16:32) (18 - 18)  SpO2: 98% (01-16-23 @ 16:32) (90% - 100%)  Neck: No palpable thyroid nodules.  CVS: S1S2, No murmurs  Respiratory: No wheezing, no crepitations  GI: Abdomen soft, non tender.  Musculoskeletal:  edema lower extremities.     Diagnostics    Free Thyroxine, Serum: AM Sched. Collection: 16-Jan-2023 06:00 (01-15 @ 07:20)  Thyroid Stimulating Hormone, Serum: AM Sched. Collection: 16-Jan-2023 06:00 (01-15 @ 07:20)

## 2023-01-16 NOTE — PROGRESS NOTE ADULT - ASSESSMENT
89 yo female with PMHx of CAD, DM, HLD, GERD, atrial thrombus , Afib on Eliquis, CHF, AS p/w fever.  Patient unable to provide history.  Per EMS patient has had fevers, body aches and shortness of breath since yesterday.  Patient lives at home alone with her home health aide.  Upon arrival, EMS states patient was sating at 90% on room air with improvement on 2 L nasal cannula. Ptn was admitted with a similar presentation nearly a year ago and was covid pos then as she is now as well. She is DNR/DNI as per her HCP  Will treat w remdesivir, dexamethasone, insulin on a sliding scale, IVF, ID, pulm consults called. dvt ppx w po Xarelto;    Covid infection: / resp failure   A fib  :  CHF:  Aortic stenosis:   DM:   GERD:   cad     1/13/2023      Covid infection: / resp failure  : ON REMDESIVIR and dexa:   A fib  : on eliquis  CHF:  per primary team : she has heart failure:  her legs were very swollen before two weeks ago per her neice  Aortic stenosis: has sever AS:  didb not do any treatment / tavr:    DM:  monitor and control   GERD: PPI  cad  : cont current meds::  on eliquis    dvt propahylaxis    1/14:      Covid infection: / resp failure  : ON REMDESIVIR AND DEXA:  She looks pretty good today  : o n 4 L of oxygen : she is alert and awake and reponds to questions  A fib  : on eliquis  CHF:  per primary team : she has heart failure:  her legs were very swollen before two weeks ago per her neice : not on diuretics at this t [place]  Aortic stenosis: has sever AS:  did  not do any treatment    DM:  monitor and control   GERD: PPI  cad  : cont current meds::  on eliquis:  dw acp     dvt prophylaxis    1/15 Wean O2. Bilevel PRN, d/c tomorrow if she doesn't use. Control FS.     1/16:      Covid infection: / resp failure  : ON REMDESIVIR d5/5  AND DEXA : d5/10 :  zosyn was stopped:  dc bipap : check vbg in am    A fib  : on eliquis  CHF:  per primary team : she has heart failure:  her legs were very swollen before two weeks ago per her niece : not on diuretics at this t mulugeta  Aortic stenosis: has severe AS:  did  not do any treatment    DM:  monitor and control   GERD: PPI  cad  : cont current meds::  on eliquis:  cary lyn

## 2023-01-16 NOTE — PROGRESS NOTE ADULT - SUBJECTIVE AND OBJECTIVE BOX
Patient is a 89y old  Female who presents with a chief complaint of resp failure (13 Jan 2023 18:37)      SUBJECTIVE / OVERNIGHT EVENTS: no new sx, feels better    MEDICATIONS  (STANDING):  apixaban 2.5 milliGRAM(s) Oral every 12 hours  benzonatate 100 milliGRAM(s) Oral three times a day  chlorhexidine 2% Cloths 1 Application(s) Topical <User Schedule>  dexAMETHasone     Tablet 6 milliGRAM(s) Oral daily  dextrose 5%. 1000 milliLiter(s) (50 mL/Hr) IV Continuous <Continuous>  dextrose 5%. 1000 milliLiter(s) (100 mL/Hr) IV Continuous <Continuous>  dextrose 50% Injectable 25 Gram(s) IV Push once  dextrose 50% Injectable 12.5 Gram(s) IV Push once  dextrose 50% Injectable 25 Gram(s) IV Push once  glucagon  Injectable 1 milliGRAM(s) IntraMuscular once  insulin glargine Injectable (LANTUS) 10 Unit(s) SubCutaneous at bedtime  insulin lispro (ADMELOG) corrective regimen sliding scale   SubCutaneous three times a day before meals  insulin lispro (ADMELOG) corrective regimen sliding scale   SubCutaneous at bedtime  insulin lispro Injectable (ADMELOG) 5 Unit(s) SubCutaneous three times a day before meals  sodium chloride 0.45%. 1000 milliLiter(s) (60 mL/Hr) IV Continuous <Continuous>    MEDICATIONS  (PRN):  acetaminophen     Tablet .. 650 milliGRAM(s) Oral every 6 hours PRN Temp greater or equal to 38C (100.4F), Mild Pain (1 - 3)  dextrose Oral Gel 15 Gram(s) Oral once PRN Blood Glucose LESS THAN 70 milliGRAM(s)/deciliter  guaiFENesin Oral Liquid (Sugar-Free) 200 milliGRAM(s) Oral every 6 hours PRN Cough      Vital Signs Last 24 Hrs  T(F): 97.7 (01-16-23 @ 20:00), Max: 97.8 (01-16-23 @ 10:59)  HR: 72 (01-16-23 @ 20:00) (64 - 90)  BP: 132/73 (01-16-23 @ 20:00) (115/73 - 132/73)  RR: 18 (01-16-23 @ 20:00) (18 - 18)  SpO2: 95% (01-16-23 @ 20:00) (90% - 100%)  Telemetry:   CAPILLARY BLOOD GLUCOSE      POCT Blood Glucose.: 223 mg/dL (16 Jan 2023 21:03)  POCT Blood Glucose.: 185 mg/dL (16 Jan 2023 16:10)  POCT Blood Glucose.: 131 mg/dL (16 Jan 2023 11:27)  POCT Blood Glucose.: 173 mg/dL (16 Jan 2023 07:20)    I&O's Summary    15 Tanner 2023 07:01  -  16 Jan 2023 07:00  --------------------------------------------------------  IN: 1480 mL / OUT: 800 mL / NET: 680 mL    16 Jan 2023 07:01  -  16 Jan 2023 21:30  --------------------------------------------------------  IN: 600 mL / OUT: 0 mL / NET: 600 mL        PHYSICAL EXAM:  GENERAL: NAD, well-developed  HEAD:  Atraumatic, Normocephalic  EYES: EOMI, PERRLA, conjunctiva and sclera clear  NECK: Supple, No JVD  CHEST/LUNG: Clear to auscultation bilaterally; No wheeze  HEART: Regular rate and rhythm; No murmurs, rubs, or gallops  ABDOMEN: Soft, Nontender, Nondistended; Bowel sounds present  EXTREMITIES:  2+ Peripheral Pulses, No clubbing, cyanosis, or edema  PSYCH: AAOx3  NEUROLOGY: non-focal  SKIN: No rashes or lesions    LABS:            CARDIAC MARKERS ( 16 Jan 2023 06:54 )  x     / x     / 18 U/L / x     / x              RADIOLOGY & ADDITIONAL TESTS:    Imaging Personally Reviewed:    Consultant(s) Notes Reviewed:      Care Discussed with Consultants/Other Providers:

## 2023-01-16 NOTE — PHYSICAL THERAPY INITIAL EVALUATION ADULT - LEVEL OF INDEPENDENCE: SIT/STAND, REHAB EVAL
held pt with pos trunk lean, poor sitting balance, inc coughing at EOB requesting to return supine, pt on covid isolation moderate assist (50% patients effort)

## 2023-01-16 NOTE — PROGRESS NOTE ADULT - ASSESSMENT
Assessment  DMT2: 89y Female with DM T2 with hyperglycemia admitted with fever,  A1C is 6.6, patient was on oral hypoglycemic agents at home, on insulin coverage, blood sugars trending down eating meals.  COVID: On treatment monitored.  CAD: on medications, monitored, asymptomatic.  HTN: On antihypertensive medications, monitored, asymptomatic.              Jovany Pagan MD  Cell:  [phone]  Office: [phone]

## 2023-01-17 LAB
ANION GAP SERPL CALC-SCNC: 8 MMOL/L — SIGNIFICANT CHANGE UP (ref 5–17)
BASE EXCESS BLDA CALC-SCNC: 4.2 MMOL/L — HIGH (ref -2–3)
BUN SERPL-MCNC: 23 MG/DL — SIGNIFICANT CHANGE UP (ref 7–23)
CALCIUM SERPL-MCNC: 9.2 MG/DL — SIGNIFICANT CHANGE UP (ref 8.4–10.5)
CHLORIDE SERPL-SCNC: 102 MMOL/L — SIGNIFICANT CHANGE UP (ref 96–108)
CK SERPL-CCNC: 20 U/L — LOW (ref 25–170)
CO2 BLDA-SCNC: 31 MMOL/L — HIGH (ref 19–24)
CO2 SERPL-SCNC: 29 MMOL/L — SIGNIFICANT CHANGE UP (ref 22–31)
CREAT SERPL-MCNC: 0.71 MG/DL — SIGNIFICANT CHANGE UP (ref 0.5–1.3)
CRP SERPL-MCNC: 19 MG/L — HIGH (ref 0–4)
CULTURE RESULTS: SIGNIFICANT CHANGE UP
CULTURE RESULTS: SIGNIFICANT CHANGE UP
EGFR: 78 ML/MIN/1.73M2 — SIGNIFICANT CHANGE UP
FERRITIN SERPL-MCNC: 93 NG/ML — SIGNIFICANT CHANGE UP (ref 15–150)
GLUCOSE BLDC GLUCOMTR-MCNC: 147 MG/DL — HIGH (ref 70–99)
GLUCOSE BLDC GLUCOMTR-MCNC: 158 MG/DL — HIGH (ref 70–99)
GLUCOSE BLDC GLUCOMTR-MCNC: 161 MG/DL — HIGH (ref 70–99)
GLUCOSE BLDC GLUCOMTR-MCNC: 246 MG/DL — HIGH (ref 70–99)
GLUCOSE SERPL-MCNC: 164 MG/DL — HIGH (ref 70–99)
HCO3 BLDA-SCNC: 29 MMOL/L — HIGH (ref 21–28)
HCT VFR BLD CALC: 36 % — SIGNIFICANT CHANGE UP (ref 34.5–45)
HGB BLD-MCNC: 11.5 G/DL — SIGNIFICANT CHANGE UP (ref 11.5–15.5)
LDH SERPL L TO P-CCNC: 192 U/L — SIGNIFICANT CHANGE UP (ref 50–242)
MCHC RBC-ENTMCNC: 28.4 PG — SIGNIFICANT CHANGE UP (ref 27–34)
MCHC RBC-ENTMCNC: 31.9 GM/DL — LOW (ref 32–36)
MCV RBC AUTO: 88.9 FL — SIGNIFICANT CHANGE UP (ref 80–100)
NRBC # BLD: 0 /100 WBCS — SIGNIFICANT CHANGE UP (ref 0–0)
PCO2 BLDA: 44 MMHG — SIGNIFICANT CHANGE UP (ref 32–45)
PH BLDA: 7.43 — SIGNIFICANT CHANGE UP (ref 7.35–7.45)
PLATELET # BLD AUTO: 199 K/UL — SIGNIFICANT CHANGE UP (ref 150–400)
PO2 BLDA: 78 MMHG — LOW (ref 83–108)
POTASSIUM SERPL-MCNC: 4.1 MMOL/L — SIGNIFICANT CHANGE UP (ref 3.5–5.3)
POTASSIUM SERPL-SCNC: 4.1 MMOL/L — SIGNIFICANT CHANGE UP (ref 3.5–5.3)
RBC # BLD: 4.05 M/UL — SIGNIFICANT CHANGE UP (ref 3.8–5.2)
RBC # FLD: 13.2 % — SIGNIFICANT CHANGE UP (ref 10.3–14.5)
SAO2 % BLDA: 98.2 % — HIGH (ref 94–98)
SODIUM SERPL-SCNC: 139 MMOL/L — SIGNIFICANT CHANGE UP (ref 135–145)
SPECIMEN SOURCE: SIGNIFICANT CHANGE UP
SPECIMEN SOURCE: SIGNIFICANT CHANGE UP
WBC # BLD: 8.35 K/UL — SIGNIFICANT CHANGE UP (ref 3.8–10.5)
WBC # FLD AUTO: 8.35 K/UL — SIGNIFICANT CHANGE UP (ref 3.8–10.5)

## 2023-01-17 RX ADMIN — Medication 5 UNIT(S): at 11:43

## 2023-01-17 RX ADMIN — CHLORHEXIDINE GLUCONATE 1 APPLICATION(S): 213 SOLUTION TOPICAL at 05:44

## 2023-01-17 RX ADMIN — Medication 5 UNIT(S): at 16:52

## 2023-01-17 RX ADMIN — Medication 5 UNIT(S): at 07:45

## 2023-01-17 RX ADMIN — Medication 1: at 07:45

## 2023-01-17 RX ADMIN — APIXABAN 2.5 MILLIGRAM(S): 2.5 TABLET, FILM COATED ORAL at 05:44

## 2023-01-17 RX ADMIN — INSULIN GLARGINE 10 UNIT(S): 100 INJECTION, SOLUTION SUBCUTANEOUS at 22:17

## 2023-01-17 RX ADMIN — Medication 100 MILLIGRAM(S): at 22:27

## 2023-01-17 RX ADMIN — APIXABAN 2.5 MILLIGRAM(S): 2.5 TABLET, FILM COATED ORAL at 17:30

## 2023-01-17 RX ADMIN — Medication 100 MILLIGRAM(S): at 13:40

## 2023-01-17 RX ADMIN — Medication 1: at 16:53

## 2023-01-17 RX ADMIN — Medication 6 MILLIGRAM(S): at 05:44

## 2023-01-17 RX ADMIN — Medication 100 MILLIGRAM(S): at 05:44

## 2023-01-17 NOTE — PROGRESS NOTE ADULT - SUBJECTIVE AND OBJECTIVE BOX
Subjective: Patient seen and examined. No new events except as noted.   Resting comfortably on room air.     REVIEW OF SYSTEMS:    CONSTITUTIONAL: + weakness, fevers or chills  EYES/ENT: No visual changes;  No vertigo or throat pain   NECK: No pain or stiffness  RESPIRATORY: No cough, wheezing, hemoptysis; No shortness of breath  CARDIOVASCULAR: No chest pain or palpitations  GASTROINTESTINAL: No abdominal or epigastric pain. No nausea, vomiting, or hematemesis; No diarrhea or constipation. No melena or hematochezia.  GENITOURINARY: No dysuria, frequency or hematuria  NEUROLOGICAL: No numbness or weakness  SKIN: No itching, burning, rashes, or lesions   All other review of systems is negative unless indicated above.    MEDICATIONS:  MEDICATIONS  (STANDING):  apixaban 2.5 milliGRAM(s) Oral every 12 hours  benzonatate 100 milliGRAM(s) Oral three times a day  chlorhexidine 2% Cloths 1 Application(s) Topical <User Schedule>  dexAMETHasone  Injectable 6 milliGRAM(s) IV Push daily  dextrose 5%. 1000 milliLiter(s) (50 mL/Hr) IV Continuous <Continuous>  dextrose 5%. 1000 milliLiter(s) (100 mL/Hr) IV Continuous <Continuous>  dextrose 50% Injectable 25 Gram(s) IV Push once  dextrose 50% Injectable 12.5 Gram(s) IV Push once  dextrose 50% Injectable 25 Gram(s) IV Push once  glucagon  Injectable 1 milliGRAM(s) IntraMuscular once  insulin glargine Injectable (LANTUS) 10 Unit(s) SubCutaneous at bedtime  insulin lispro (ADMELOG) corrective regimen sliding scale   SubCutaneous three times a day before meals  insulin lispro (ADMELOG) corrective regimen sliding scale   SubCutaneous at bedtime  remdesivir  IVPB 100 milliGRAM(s) IV Intermittent every 24 hours  remdesivir  IVPB   IV Intermittent   sodium chloride 0.45%. 1000 milliLiter(s) (60 mL/Hr) IV Continuous <Continuous>    PHYSICAL EXAM:  Vital Signs Last 24 Hrs  T(C): 36.4 (17 Jan 2023 04:37), Max: 36.6 (16 Jan 2023 10:59)  T(F): 97.6 (17 Jan 2023 04:37), Max: 97.8 (16 Jan 2023 10:59)  HR: 59 (17 Jan 2023 08:56) (59 - 90)  BP: 135/85 (17 Jan 2023 04:37) (115/73 - 135/85)  BP(mean): --  RR: 18 (17 Jan 2023 08:56) (18 - 18)  SpO2: 96% (17 Jan 2023 08:56) (90% - 100%)    Parameters below as of 17 Jan 2023 08:56  Patient On (Oxygen Delivery Method): room air    I&O's Summary    16 Jan 2023 07:01  -  17 Jan 2023 07:00  --------------------------------------------------------  IN: 600 mL / OUT: 0 mL / NET: 600 mL    Appearance: Normal	  HEENT: Normal oral mucosa, PERRL, EOMI	  Lymphatic: No lymphadenopathy , no edema  Cardiovascular: Irregular S1 S2, No JVD, No murmurs , Peripheral pulses palpable 2+ bilaterally  Respiratory: Decreased BS, on RA  Gastrointestinal: Soft, Non-tender, + BS	  Skin: No rashes, No ecchymoses, No cyanosis, warm to touch  Musculoskeletal: Normal range of motion, normal strength  Psychiatry: Mood & affect appropriate  Ext: No edema    LABS:    CARDIAC MARKERS:    proBNP:   Lipid Profile:   HgA1c:   TSH:     TELEMETRY: Afib 50-90  ECG:  	  RADIOLOGY:   DIAGNOSTIC TESTING:  [ ] Echocardiogram:  [ ]  Catheterization:  [ ] Stress Test:    OTHER:

## 2023-01-17 NOTE — PROGRESS NOTE ADULT - SUBJECTIVE AND OBJECTIVE BOX
Patient is a 89y old  Female who presents with a chief complaint of resp failure (13 Jan 2023 18:37)      SUBJECTIVE / OVERNIGHT EVENTS: ptn is oxygenating well on RA: 95% at rest, and w exertion. will plan for dc and dc steroids, completing remdesivir today. cleared for DC to [facility] starting tomorrow    MEDICATIONS  (STANDING):  apixaban 2.5 milliGRAM(s) Oral every 12 hours  benzonatate 100 milliGRAM(s) Oral three times a day  chlorhexidine 2% Cloths 1 Application(s) Topical <User Schedule>  dexAMETHasone     Tablet 6 milliGRAM(s) Oral daily  dextrose 5%. 1000 milliLiter(s) (50 mL/Hr) IV Continuous <Continuous>  dextrose 5%. 1000 milliLiter(s) (100 mL/Hr) IV Continuous <Continuous>  dextrose 50% Injectable 25 Gram(s) IV Push once  dextrose 50% Injectable 12.5 Gram(s) IV Push once  dextrose 50% Injectable 25 Gram(s) IV Push once  glucagon  Injectable 1 milliGRAM(s) IntraMuscular once  insulin glargine Injectable (LANTUS) 10 Unit(s) SubCutaneous at bedtime  insulin lispro (ADMELOG) corrective regimen sliding scale   SubCutaneous three times a day before meals  insulin lispro (ADMELOG) corrective regimen sliding scale   SubCutaneous at bedtime  insulin lispro Injectable (ADMELOG) 5 Unit(s) SubCutaneous three times a day before meals    MEDICATIONS  (PRN):  acetaminophen     Tablet .. 650 milliGRAM(s) Oral every 6 hours PRN Temp greater or equal to 38C (100.4F), Mild Pain (1 - 3)  dextrose Oral Gel 15 Gram(s) Oral once PRN Blood Glucose LESS THAN 70 milliGRAM(s)/deciliter  guaiFENesin Oral Liquid (Sugar-Free) 200 milliGRAM(s) Oral every 6 hours PRN Cough      Vital Signs Last 24 Hrs  T(F): 98 (01-17-23 @ 16:49), Max: 98 (01-17-23 @ 16:49)  HR: 79 (01-17-23 @ 16:49) (59 - 79)  BP: 136/69 (01-17-23 @ 16:49) (128/66 - 136/69)  RR: 18 (01-17-23 @ 16:49) (18 - 18)  SpO2: 97% (01-17-23 @ 16:49) (94% - 100%)  Telemetry:   CAPILLARY BLOOD GLUCOSE      POCT Blood Glucose.: 158 mg/dL (17 Jan 2023 16:26)  POCT Blood Glucose.: 147 mg/dL (17 Jan 2023 11:42)  POCT Blood Glucose.: 161 mg/dL (17 Jan 2023 07:25)  POCT Blood Glucose.: 223 mg/dL (16 Jan 2023 21:03)    I&O's Summary    16 Jan 2023 07:01  -  17 Jan 2023 07:00  --------------------------------------------------------  IN: 600 mL / OUT: 0 mL / NET: 600 mL    17 Jan 2023 07:01  -  17 Jan 2023 18:20  --------------------------------------------------------  IN: 480 mL / OUT: 900 mL / NET: -420 mL        PHYSICAL EXAM:  GENERAL: NAD, well-developed  HEAD:  Atraumatic, Normocephalic  EYES: EOMI, PERRLA, conjunctiva and sclera clear  NECK: Supple, No JVD  CHEST/LUNG: Clear to auscultation bilaterally; No wheeze  HEART: Regular rate and rhythm; No murmurs, rubs, or gallops  ABDOMEN: Soft, Nontender, Nondistended; Bowel sounds present  EXTREMITIES:  2+ Peripheral Pulses, No clubbing, cyanosis, or edema  PSYCH: AAOx3  NEUROLOGY: non-focal  SKIN: No rashes or lesions    LABS:                        11.5   8.35  )-----------( 199      ( 17 Jan 2023 10:09 )             36.0     01-17    139  |  102  |  23  ----------------------------<  164<H>  4.1   |  29  |  0.71    Ca    9.2      17 Jan 2023 10:09        CARDIAC MARKERS ( 17 Jan 2023 06:05 )  x     / x     / 20 U/L / x     / x      CARDIAC MARKERS ( 16 Jan 2023 06:54 )  x     / x     / 18 U/L / x     / x              RADIOLOGY & ADDITIONAL TESTS:    Imaging Personally Reviewed:    Consultant(s) Notes Reviewed:      Care Discussed with Consultants/Other Providers:

## 2023-01-17 NOTE — PROGRESS NOTE ADULT - ASSESSMENT
89 yo female with PMHx of CAD, DM, HLD, GERD, atrial thrombus , Afib on Eliquis, CHF, AS p/w fever.  Patient unable to provide history.  Per EMS patient has had fevers, body aches and shortness of breath since yesterday.  Patient lives at home alone with her home health aide.  Upon arrival, EMS states patient was sating at 90% on room air with improvement on 2 L nasal cannula. Ptn was admitted with a similar presentation nearly a year ago and was covid pos then as she is now as well. She is DNR/DNI as per her HCP  Will treat w remdesivir, dexamethasone, insulin on a sliding scale, IVF, ID, pulm consults called. dvt ppx w po Xarelto;    Covid infection: / resp failure   A fib  :  CHF:  Aortic stenosis:   DM:   GERD:   cad     1/13/2023      Covid infection: / resp failure  : ON REMDESIVIR and dexa:   A fib  : on eliquis  CHF:  per primary team : she has heart failure:  her legs were very swollen before two weeks ago per her neice  Aortic stenosis: has sever AS:  didb not do any treatment / tavr:    DM:  monitor and control   GERD: PPI  cad  : cont current meds::  on eliquis    dvt propahylaxis    1/14:      Covid infection: / resp failure  : ON REMDESIVIR AND DEXA:  She looks pretty good today  : o n 4 L of oxygen : she is alert and awake and reponds to questions  A fib  : on eliquis  CHF:  per primary team : she has heart failure:  her legs were very swollen before two weeks ago per her neice : not on diuretics at this t [place]  Aortic stenosis: has sever AS:  did  not do any treatment    DM:  monitor and control   GERD: PPI  cad  : cont current meds::  on eliquis:  dw acp     dvt prophylaxis    1/15 Wean O2. Bilevel PRN, d/c tomorrow if she doesn't use. Control FS.     1/16:      Covid infection: / resp failure  : ON REMDESIVIR d5/5  AND DEXA : d5/10 :  zosyn was stopped:  dc bipap : check vbg in am    A fib  : on eliquis  CHF:  per primary team : she has heart failure:  her legs were very swollen before two weeks ago per her niece : not on diuretics at this t [place]  Aortic stenosis: has severe AS:  did  not do any treatment    DM:  monitor and control   GERD: PPI  cad  : cont current meds::  on eliquis:  cary lyn       1/17:    Covid infection: / resp failure  : Ofinished REMDESIVIR d5/5  AND DEXA : d6/10 :  zosyn was stopped:  dc bipap : check vbg in : seems pretty good   dc AVAPS  A fib  : on eliquis  CHF:  per primary team : she has heart failure:  her legs were very swollen before two weeks ago per her niece : not on diuretics at this t [place]  Aortic stenosis: has severe AS:  did  not do any treatment    DM:  monitor and control   GERD: PPI  cad  : cont current meds::  on eliquis:  cary acp : dc avaps

## 2023-01-17 NOTE — PROGRESS NOTE ADULT - SUBJECTIVE AND OBJECTIVE BOX
Chief complaint    Patient is a 89y old  Female who presents with a chief complaint of resp failure (13 Jan 2023 18:37)   Review of systems  Patient in bed, appears comfortable.    Labs and Fingersticks  CAPILLARY BLOOD GLUCOSE      POCT Blood Glucose.: 158 mg/dL (17 Jan 2023 16:26)  POCT Blood Glucose.: 147 mg/dL (17 Jan 2023 11:42)  POCT Blood Glucose.: 161 mg/dL (17 Jan 2023 07:25)  POCT Blood Glucose.: 223 mg/dL (16 Jan 2023 21:03)      Anion Gap, Serum: 8 (01-17 @ 10:09)      Calcium, Total Serum: 9.2 (01-17 @ 10:09)          01-17    139  |  102  |  23  ----------------------------<  164<H>  4.1   |  29  |  0.71    Ca    9.2      17 Jan 2023 10:09                          11.5   8.35  )-----------( 199      ( 17 Jan 2023 10:09 )             36.0     Medications  MEDICATIONS  (STANDING):  apixaban 2.5 milliGRAM(s) Oral every 12 hours  benzonatate 100 milliGRAM(s) Oral three times a day  chlorhexidine 2% Cloths 1 Application(s) Topical <User Schedule>  dexAMETHasone     Tablet 6 milliGRAM(s) Oral daily  dextrose 5%. 1000 milliLiter(s) (50 mL/Hr) IV Continuous <Continuous>  dextrose 5%. 1000 milliLiter(s) (100 mL/Hr) IV Continuous <Continuous>  dextrose 50% Injectable 25 Gram(s) IV Push once  dextrose 50% Injectable 12.5 Gram(s) IV Push once  dextrose 50% Injectable 25 Gram(s) IV Push once  glucagon  Injectable 1 milliGRAM(s) IntraMuscular once  insulin glargine Injectable (LANTUS) 10 Unit(s) SubCutaneous at bedtime  insulin lispro (ADMELOG) corrective regimen sliding scale   SubCutaneous three times a day before meals  insulin lispro (ADMELOG) corrective regimen sliding scale   SubCutaneous at bedtime  insulin lispro Injectable (ADMELOG) 5 Unit(s) SubCutaneous three times a day before meals  sodium chloride 0.45%. 1000 milliLiter(s) (60 mL/Hr) IV Continuous <Continuous>      Physical Exam  General: Patient appears comfortable.  Vital Signs Last 12 Hrs  T(F): 98 (01-17-23 @ 16:49), Max: 98 (01-17-23 @ 16:49)  HR: 79 (01-17-23 @ 16:49) (59 - 79)  BP: 136/69 (01-17-23 @ 16:49) (129/85 - 136/69)  BP(mean): --  RR: 18 (01-17-23 @ 16:49) (18 - 18)  SpO2: 97% (01-17-23 @ 16:49) (94% - 98%)  Neck: No palpable thyroid nodules.  CVS: S1S2, No murmurs  Respiratory: No wheezing, no crepitations  GI: Abdomen soft, non tender.  Musculoskeletal:  edema lower extremities.     Diagnostics    Free Thyroxine, Serum: AM Sched. Collection: 16-Jan-2023 06:00 (01-15 @ 07:20)  Thyroid Stimulating Hormone, Serum: AM Sched. Collection: 16-Jan-2023 06:00 (01-15 @ 07:20)

## 2023-01-17 NOTE — PROGRESS NOTE ADULT - ASSESSMENT
89 y/o F PMHx CAD, DM, HLD, GERD, atrial thrombus, Afib on Eliquis, CHF, AS who presented with fever, SOB.     sepsis 2/2 COVID, acute hypoxic resp failure  SARS-CoV-2 PCR positive  CXR b/l hazy opacities  CT chest c/f pulm edema, zosyn discontinued  leukocytosis resolved  s/p remdesivir x 5 day course, completed 1/16  c/w decadron x 10 days  trend inflammatory markers  weaned off O2  isolation per infection control policy     Varinder Mckenzie M.D.  [facility], Division of Infectious Diseases  [phone]  After 5pm on weekdays and all day on weekends - please call [phone]

## 2023-01-17 NOTE — PROGRESS NOTE ADULT - ASSESSMENT
Assessment  DMT2: 89y Female with DM T2 with hyperglycemia admitted with fever,  A1C is 6.6, patient was on oral hypoglycemic agents at home, on insulin coverage, blood sugars improving.  COVID: On treatment monitored.  CAD: on medications, monitored, asymptomatic.  HTN: On antihypertensive medications, monitored, asymptomatic.              Jovany Pagan MD  Cell:  [phone]  Office: [phone]

## 2023-01-17 NOTE — PROGRESS NOTE ADULT - ASSESSMENT
89 yo female with PMHx of CAD, DM, HLD, GERD, atrial thrombus , Afib on Eliquis, CHF, AS p/w fever.  Patient unable to provide history.  Per EMS patient has had fevers, body aches and shortness of breath since yesterday.  Patient lives at home alone with her home health aide.     1/12:  Upon arrival, EMS states patient was sating at 90% on room air with improvement on 2 L nasal cannula. Ptn was admitted with a similar presentation nearly a year ago and was covid pos then as she is now as well. She is DNR/DNI as per her HCP  Will treat w remdesivir, dexamethasone, insulin on a sliding scale, IVF, ID, pulm consults called. dvt ppx not necessary, ptn is on chronic AC w ELiquis    1/13: ptn is dyspneic, has a lot of secretions, remains on BIPAP, seen by PULM, ID, Card, on chroninc AC, DDImer >11K, Procal >1, on steroids, remdesivir, day 2 hospitalization for acute hypoxic respiratory failure 2/2 COvid PNA. ptn is DNR/DNI as per HCP. will add Zosyn, ID in agreement, will add respiratory vest for secretion mobilization    1/14: ptn feels much better, on 4 l O2 NC, high FS while on decadron, ENDO called, pulm and ID following. cont remdesivir, decadron zosyn. check speech and swallow eval, keep on dysphagia 2 diet for now,   1/15: tolerating 2LNC, overall feeling much better  1/16: RA pulse ox 95%, awaiting pulse ox w exertion  1/17: ptn is oxygenating well on RA: 95% at rest, and w exertion. will plan for dc and dc steroids, completing remdesivir today. cleared for DC to [facility] starting tomorrow

## 2023-01-17 NOTE — PROVIDER CONTACT NOTE (OTHER) - ASSESSMENT
No acute change in stauts, VSS, denies dizziness/CP/SOB. AF with rate 50-90s before and after the episode. Had a pause x 3 sec yesterday-Metoprolol ER d/c'd yesterday.
No acute change in status, remains A&O x 3 at baseline, VSS, denies dizziness/CP/SOB. AF with rate 50-60s on tele before and after the episode. Currently on Metoprolol ER 25mg daily.

## 2023-01-17 NOTE — PROGRESS NOTE ADULT - SUBJECTIVE AND OBJECTIVE BOX
Date of Service: 01-17-23 @ 14:04    Patient is a 89y old  Female who presents with a chief complaint of resp failure (13 Jan 2023 18:37)      Any change in ROS: off oxygen : alert and awake:  used bipap for 2 hours     MEDICATIONS  (STANDING):  apixaban 2.5 milliGRAM(s) Oral every 12 hours  benzonatate 100 milliGRAM(s) Oral three times a day  chlorhexidine 2% Cloths 1 Application(s) Topical <User Schedule>  dexAMETHasone     Tablet 6 milliGRAM(s) Oral daily  dextrose 5%. 1000 milliLiter(s) (50 mL/Hr) IV Continuous <Continuous>  dextrose 5%. 1000 milliLiter(s) (100 mL/Hr) IV Continuous <Continuous>  dextrose 50% Injectable 25 Gram(s) IV Push once  dextrose 50% Injectable 12.5 Gram(s) IV Push once  dextrose 50% Injectable 25 Gram(s) IV Push once  glucagon  Injectable 1 milliGRAM(s) IntraMuscular once  insulin glargine Injectable (LANTUS) 10 Unit(s) SubCutaneous at bedtime  insulin lispro (ADMELOG) corrective regimen sliding scale   SubCutaneous three times a day before meals  insulin lispro (ADMELOG) corrective regimen sliding scale   SubCutaneous at bedtime  insulin lispro Injectable (ADMELOG) 5 Unit(s) SubCutaneous three times a day before meals  sodium chloride 0.45%. 1000 milliLiter(s) (60 mL/Hr) IV Continuous <Continuous>    MEDICATIONS  (PRN):  acetaminophen     Tablet .. 650 milliGRAM(s) Oral every 6 hours PRN Temp greater or equal to 38C (100.4F), Mild Pain (1 - 3)  dextrose Oral Gel 15 Gram(s) Oral once PRN Blood Glucose LESS THAN 70 milliGRAM(s)/deciliter  guaiFENesin Oral Liquid (Sugar-Free) 200 milliGRAM(s) Oral every 6 hours PRN Cough    Vital Signs Last 24 Hrs  T(C): 36.4 (17 Jan 2023 10:15), Max: 36.5 (16 Jan 2023 20:00)  T(F): 97.5 (17 Jan 2023 10:15), Max: 97.7 (16 Jan 2023 20:00)  HR: 64 (17 Jan 2023 10:15) (59 - 90)  BP: 129/85 (17 Jan 2023 10:15) (116/77 - 135/85)  BP(mean): --  RR: 18 (17 Jan 2023 13:54) (18 - 18)  SpO2: 96% (17 Jan 2023 13:54) (90% - 100%)    Parameters below as of 17 Jan 2023 13:54  Patient On (Oxygen Delivery Method): room air        I&O's Summary    16 Jan 2023 07:01  -  17 Jan 2023 07:00  --------------------------------------------------------  IN: 600 mL / OUT: 0 mL / NET: 600 mL    17 Jan 2023 07:01  -  17 Jan 2023 14:04  --------------------------------------------------------  IN: 480 mL / OUT: 0 mL / NET: 480 mL          Physical Exam:   GENERAL: NAD, well-groomed, well-developed  HEENT: LORNA/   Atraumatic, Normocephalic  ENMT: No tonsillar erythema, exudates, or enlargement; Moist mucous membranes, Good dentition, No lesions  NECK: Supple, No JVD, Normal thyroid  CHEST/LUNG: Clear to auscultaion  CVS: Regular rate and rhythm; No murmurs, rubs, or gallops  GI: : Soft, Nontender, Nondistended; Bowel sounds present  NERVOUS SYSTEM:  Alert & awake and responsive to questions  EXTREMITIES:  2+ Peripheral Pulses, No clubbing, cyanosis, or edema  LYMPH: No lymphadenopathy noted  SKIN: No rashes or lesions  ENDOCRINOLOGY: No Thyromegaly  PSYCH: calm     Labs:  ABG - ( 17 Jan 2023 06:03 )  pH, Arterial: 7.43  pH, Blood: x     /  pCO2: 44    /  pO2: 78    / HCO3: 29    / Base Excess: 4.2   /  SaO2: 98.2            32  CARDIAC MARKERS ( 17 Jan 2023 06:05 )  x     / x     / 20 U/L / x     / x      CARDIAC MARKERS ( 16 Jan 2023 06:54 )  x     / x     / 18 U/L / x     / x                                11.5   8.35  )-----------( 199      ( 17 Jan 2023 10:09 )             36.0     01-17    139  |  102  |  23  ----------------------------<  164<H>  4.1   |  29  |  0.71    Ca    9.2      17 Jan 2023 10:09      CAPILLARY BLOOD GLUCOSE      POCT Blood Glucose.: 147 mg/dL (17 Jan 2023 11:42)  POCT Blood Glucose.: 161 mg/dL (17 Jan 2023 07:25)  POCT Blood Glucose.: 223 mg/dL (16 Jan 2023 21:03)  POCT Blood Glucose.: 185 mg/dL (16 Jan 2023 16:10)            D-Dimer Assay, Quantitative: 282 ng/mL DDU (01-16 @ 06:52)  D-Dimer Assay, Quantitative: 395 ng/mL DDU (01-13 @ 11:35)  Serum Pro-Brain Natriuretic Peptide: 72644 pg/mL (01-16 @ 06:54)        RECENT CULTURES:  01-12 @ 10:32 Clean Catch Clean Catch (Midstream)     rad< from: CT Chest No Cont (01.14.23 @ 15:14) >    COMPARISON: None. CT abdomen and pelvis from 11/19/2019.    CONTRAST/COMPLICATIONS:  IV Contrast: None.  Oral Contrast: None.  Complications: None documented.    PROCEDURE:  CT scan of the chest was obtained without intravenous contrast.    FINDINGS:    LYMPH NODES: No lymphadenopathy.    HEART/VASCULATURE: Cardiomegaly. No pericardial effusion. The aorta is   normal in caliber.  There are aortic, aortic valve and coronary artery   calcifications.    AIRWAYS/LUNGS/PLEURA: Scattered bilateral patchy groundglass opacities.   Bilateral interlobular septal thickening. Trace bilateral pleural   effusions, right greater than left..    UPPER ABDOMEN: Grossly unremarkable..    BONES/SOFT TISSUES: Degenerative changes.    IMPRESSION:  Intralobular septal thickening, cardiomegaly, scattered ground glass   opacities with pleural effusions, suggestive of pulmonary edema.    --- End of Report ---           SUKH OLGUIN MD; Resident Radiologist  This document has been electronically signed.  SHANIQUA HERNANDEZ MD; Attending Radiologist  This document has been electronically signed. Jan 14 2023  6:49PM    < end of copied text >             No growth    01-12 @ 09:50 .Blood Blood-Peripheral                No growth to date.    01-12 @ 09:35 .Blood Blood-Peripheral                No growth to date.          RESPIRATORY CULTURES:          Studies  Chest X-RAY  CT SCAN Chest   Venous Dopplers: LE:   CT Abdomen  Others

## 2023-01-17 NOTE — PROGRESS NOTE ADULT - SUBJECTIVE AND OBJECTIVE BOX
[facility], Division of Infectious Diseases  ALINA Mcneil Y. Patel, S. Shah, G. Jonah  [phone]  ([phone] - weekdays after 5pm and weekends)    Name: SONYA MYERS  Age/Gender: 89y Female  MRN: [id number]    Interval History:  Patient seen this morning.   Notes reviewed.   No concerning overnight events.  Afebrile.   states she doesn't really feel like eating    Allergies: No Known Allergies      Objective:  Vitals:   T(F): 97.5 (01-17-23 @ 10:15), Max: 97.7 (01-16-23 @ 20:00)  HR: 64 (01-17-23 @ 10:15) (59 - 90)  BP: 129/85 (01-17-23 @ 10:15) (116/77 - 135/85)  RR: 18 (01-17-23 @ 10:15) (18 - 18)  SpO2: 97% (01-17-23 @ 10:15) (90% - 100%)  Physical Examination:  General: no acute distress  HEENT: anicteric  Cardio: S1, S2, normal rate  Resp: clear to auscultation anteriorly  Abd: soft, NT, ND  Ext: no LE edema  Skin: warm, dry  Psych: flat affect    Laboratory Studies:  CBC:                       11.5   8.35  )-----------( 199      ( 17 Jan 2023 10:09 )             36.0     WBC Trend:  8.35 01-17-23 @ 10:09  8.60 01-13-23 @ 11:35  11.79 01-12-23 @ 10:21    CMP: 01-17    139  |  102  |  23  ----------------------------<  164<H>  4.1   |  29  |  0.71    Ca    9.2      17 Jan 2023 10:09              Microbiology: reviewed     Culture - Urine (collected 01-12-23 @ 10:32)  Source: Clean Catch Clean Catch (Midstream)  Final Report (01-13-23 @ 11:01):    No growth    Culture - Blood (collected 01-12-23 @ 09:50)  Source: .Blood Blood-Peripheral  Preliminary Report (01-13-23 @ 15:02):    No growth to date.    Culture - Blood (collected 01-12-23 @ 09:35)  Source: .Blood Blood-Peripheral  Preliminary Report (01-13-23 @ 15:02):    No growth to date.        Radiology: reviewed     Medications:  acetaminophen     Tablet .. 650 milliGRAM(s) Oral every 6 hours PRN  apixaban 2.5 milliGRAM(s) Oral every 12 hours  benzonatate 100 milliGRAM(s) Oral three times a day  chlorhexidine 2% Cloths 1 Application(s) Topical <User Schedule>  dexAMETHasone     Tablet 6 milliGRAM(s) Oral daily  dextrose 5%. 1000 milliLiter(s) IV Continuous <Continuous>  dextrose 5%. 1000 milliLiter(s) IV Continuous <Continuous>  dextrose 50% Injectable 25 Gram(s) IV Push once  dextrose 50% Injectable 12.5 Gram(s) IV Push once  dextrose 50% Injectable 25 Gram(s) IV Push once  dextrose Oral Gel 15 Gram(s) Oral once PRN  glucagon  Injectable 1 milliGRAM(s) IntraMuscular once  guaiFENesin Oral Liquid (Sugar-Free) 200 milliGRAM(s) Oral every 6 hours PRN  insulin glargine Injectable (LANTUS) 10 Unit(s) SubCutaneous at bedtime  insulin lispro (ADMELOG) corrective regimen sliding scale   SubCutaneous three times a day before meals  insulin lispro (ADMELOG) corrective regimen sliding scale   SubCutaneous at bedtime  insulin lispro Injectable (ADMELOG) 5 Unit(s) SubCutaneous three times a day before meals  sodium chloride 0.45%. 1000 milliLiter(s) IV Continuous <Continuous>    Antimicrobials:

## 2023-01-18 ENCOUNTER — TRANSCRIPTION ENCOUNTER (OUTPATIENT)
Age: 88
End: 2023-01-18

## 2023-01-18 VITALS
OXYGEN SATURATION: 96 % | RESPIRATION RATE: 17 BRPM | DIASTOLIC BLOOD PRESSURE: 88 MMHG | HEART RATE: 87 BPM | SYSTOLIC BLOOD PRESSURE: 142 MMHG

## 2023-01-18 LAB
BASE EXCESS BLDA CALC-SCNC: 5.4 MMOL/L — HIGH (ref -2–3)
CO2 BLDA-SCNC: 30 MMOL/L — HIGH (ref 19–24)
CRP SERPL-MCNC: 13 MG/L — HIGH (ref 0–4)
FERRITIN SERPL-MCNC: 85 NG/ML — SIGNIFICANT CHANGE UP (ref 15–150)
GLUCOSE BLDC GLUCOMTR-MCNC: 142 MG/DL — HIGH (ref 70–99)
GLUCOSE BLDC GLUCOMTR-MCNC: 85 MG/DL — SIGNIFICANT CHANGE UP (ref 70–99)
GLUCOSE BLDC GLUCOMTR-MCNC: 94 MG/DL — SIGNIFICANT CHANGE UP (ref 70–99)
HCO3 BLDA-SCNC: 29 MMOL/L — HIGH (ref 21–28)
LDH SERPL L TO P-CCNC: 187 U/L — SIGNIFICANT CHANGE UP (ref 50–242)
PCO2 BLDA: 38 MMHG — SIGNIFICANT CHANGE UP (ref 32–45)
PH BLDA: 7.49 — HIGH (ref 7.35–7.45)
PO2 BLDA: 95 MMHG — SIGNIFICANT CHANGE UP (ref 83–108)
SAO2 % BLDA: 99.7 % — HIGH (ref 94–98)

## 2023-01-18 PROCEDURE — 99291 CRITICAL CARE FIRST HOUR: CPT

## 2023-01-18 PROCEDURE — 82803 BLOOD GASES ANY COMBINATION: CPT

## 2023-01-18 PROCEDURE — 87040 BLOOD CULTURE FOR BACTERIA: CPT

## 2023-01-18 PROCEDURE — 97110 THERAPEUTIC EXERCISES: CPT

## 2023-01-18 PROCEDURE — 84145 PROCALCITONIN (PCT): CPT

## 2023-01-18 PROCEDURE — 80053 COMPREHEN METABOLIC PANEL: CPT

## 2023-01-18 PROCEDURE — 96365 THER/PROPH/DIAG IV INF INIT: CPT

## 2023-01-18 PROCEDURE — 85379 FIBRIN DEGRADATION QUANT: CPT

## 2023-01-18 PROCEDURE — 80048 BASIC METABOLIC PNL TOTAL CA: CPT

## 2023-01-18 PROCEDURE — 84439 ASSAY OF FREE THYROXINE: CPT

## 2023-01-18 PROCEDURE — 85014 HEMATOCRIT: CPT

## 2023-01-18 PROCEDURE — 82550 ASSAY OF CK (CPK): CPT

## 2023-01-18 PROCEDURE — 97162 PT EVAL MOD COMPLEX 30 MIN: CPT

## 2023-01-18 PROCEDURE — 83880 ASSAY OF NATRIURETIC PEPTIDE: CPT

## 2023-01-18 PROCEDURE — 85027 COMPLETE CBC AUTOMATED: CPT

## 2023-01-18 PROCEDURE — 85610 PROTHROMBIN TIME: CPT

## 2023-01-18 PROCEDURE — 71250 CT THORAX DX C-: CPT

## 2023-01-18 PROCEDURE — 84478 ASSAY OF TRIGLYCERIDES: CPT

## 2023-01-18 PROCEDURE — 93308 TTE F-UP OR LMTD: CPT

## 2023-01-18 PROCEDURE — 85730 THROMBOPLASTIN TIME PARTIAL: CPT

## 2023-01-18 PROCEDURE — 83036 HEMOGLOBIN GLYCOSYLATED A1C: CPT

## 2023-01-18 PROCEDURE — 81001 URINALYSIS AUTO W/SCOPE: CPT

## 2023-01-18 PROCEDURE — 82784 ASSAY IGA/IGD/IGG/IGM EACH: CPT

## 2023-01-18 PROCEDURE — 85384 FIBRINOGEN ACTIVITY: CPT

## 2023-01-18 PROCEDURE — 80076 HEPATIC FUNCTION PANEL: CPT

## 2023-01-18 PROCEDURE — 96368 THER/DIAG CONCURRENT INF: CPT

## 2023-01-18 PROCEDURE — 86140 C-REACTIVE PROTEIN: CPT

## 2023-01-18 PROCEDURE — 84295 ASSAY OF SERUM SODIUM: CPT

## 2023-01-18 PROCEDURE — 82330 ASSAY OF CALCIUM: CPT

## 2023-01-18 PROCEDURE — 87640 STAPH A DNA AMP PROBE: CPT

## 2023-01-18 PROCEDURE — 0225U NFCT DS DNA&RNA 21 SARSCOV2: CPT

## 2023-01-18 PROCEDURE — 84132 ASSAY OF SERUM POTASSIUM: CPT

## 2023-01-18 PROCEDURE — 83605 ASSAY OF LACTIC ACID: CPT

## 2023-01-18 PROCEDURE — 85018 HEMOGLOBIN: CPT

## 2023-01-18 PROCEDURE — 82435 ASSAY OF BLOOD CHLORIDE: CPT

## 2023-01-18 PROCEDURE — 83520 IMMUNOASSAY QUANT NOS NONAB: CPT

## 2023-01-18 PROCEDURE — 85025 COMPLETE CBC W/AUTO DIFF WBC: CPT

## 2023-01-18 PROCEDURE — 84443 ASSAY THYROID STIM HORMONE: CPT

## 2023-01-18 PROCEDURE — 82565 ASSAY OF CREATININE: CPT

## 2023-01-18 PROCEDURE — 87641 MR-STAPH DNA AMP PROBE: CPT

## 2023-01-18 PROCEDURE — 36600 WITHDRAWAL OF ARTERIAL BLOOD: CPT

## 2023-01-18 PROCEDURE — 82728 ASSAY OF FERRITIN: CPT

## 2023-01-18 PROCEDURE — 97530 THERAPEUTIC ACTIVITIES: CPT

## 2023-01-18 PROCEDURE — 36415 COLL VENOUS BLD VENIPUNCTURE: CPT

## 2023-01-18 PROCEDURE — 83615 LACTATE (LD) (LDH) ENZYME: CPT

## 2023-01-18 PROCEDURE — 96375 TX/PRO/DX INJ NEW DRUG ADDON: CPT

## 2023-01-18 PROCEDURE — 82947 ASSAY GLUCOSE BLOOD QUANT: CPT

## 2023-01-18 PROCEDURE — 94660 CPAP INITIATION&MGMT: CPT

## 2023-01-18 PROCEDURE — 87086 URINE CULTURE/COLONY COUNT: CPT

## 2023-01-18 PROCEDURE — 82962 GLUCOSE BLOOD TEST: CPT

## 2023-01-18 PROCEDURE — 71045 X-RAY EXAM CHEST 1 VIEW: CPT

## 2023-01-18 RX ORDER — SENNA PLUS 8.6 MG/1
2 TABLET ORAL AT BEDTIME
Refills: 0 | Status: DISCONTINUED | OUTPATIENT
Start: 2023-01-18 | End: 2023-01-18

## 2023-01-18 RX ORDER — ACETAMINOPHEN 500 MG
0 TABLET ORAL
Qty: 0 | Refills: 0 | DISCHARGE

## 2023-01-18 RX ORDER — POLYETHYLENE GLYCOL 3350 17 G/17G
17 POWDER, FOR SOLUTION ORAL
Qty: 0 | Refills: 0 | DISCHARGE
Start: 2023-01-18

## 2023-01-18 RX ORDER — SENNA PLUS 8.6 MG/1
2 TABLET ORAL
Qty: 0 | Refills: 0 | DISCHARGE
Start: 2023-01-18

## 2023-01-18 RX ORDER — ACETAMINOPHEN 500 MG
2 TABLET ORAL
Qty: 0 | Refills: 0 | DISCHARGE
Start: 2023-01-18

## 2023-01-18 RX ORDER — FUROSEMIDE 40 MG
1 TABLET ORAL
Qty: 0 | Refills: 0 | DISCHARGE

## 2023-01-18 RX ORDER — POLYETHYLENE GLYCOL 3350 17 G/17G
17 POWDER, FOR SOLUTION ORAL DAILY
Refills: 0 | Status: DISCONTINUED | OUTPATIENT
Start: 2023-01-18 | End: 2023-01-18

## 2023-01-18 RX ORDER — ATORVASTATIN CALCIUM 80 MG/1
1 TABLET, FILM COATED ORAL
Qty: 0 | Refills: 0 | DISCHARGE

## 2023-01-18 RX ADMIN — APIXABAN 2.5 MILLIGRAM(S): 2.5 TABLET, FILM COATED ORAL at 17:12

## 2023-01-18 RX ADMIN — Medication 5 UNIT(S): at 08:06

## 2023-01-18 RX ADMIN — POLYETHYLENE GLYCOL 3350 17 GRAM(S): 17 POWDER, FOR SOLUTION ORAL at 12:16

## 2023-01-18 RX ADMIN — Medication 5 UNIT(S): at 12:16

## 2023-01-18 RX ADMIN — CHLORHEXIDINE GLUCONATE 1 APPLICATION(S): 213 SOLUTION TOPICAL at 06:30

## 2023-01-18 RX ADMIN — Medication 5 UNIT(S): at 17:10

## 2023-01-18 RX ADMIN — Medication 100 MILLIGRAM(S): at 06:08

## 2023-01-18 RX ADMIN — APIXABAN 2.5 MILLIGRAM(S): 2.5 TABLET, FILM COATED ORAL at 06:15

## 2023-01-18 NOTE — DISCHARGE NOTE PROVIDER - NSDCFUADDAPPT_GEN_ALL_CORE_FT
Please follow up with primary care provider upon discharge from rehab.    APPTS ARE READY TO BE MADE: [x] YES    Best Family or Patient Contact (if needed):    Additional Information about above appointments (if needed):    1:   2:   3:     Other comments or requests:    Please follow up with primary care provider upon discharge from rehab.    APPTS ARE READY TO BE MADE: [x] YES    Best Family or Patient Contact (if needed):    Additional Information about above appointments (if needed):    1:   2:   3:     Other comments or requests:     Patient is being discharged to rehab. Caregiver will arrange follow up.

## 2023-01-18 NOTE — DISCHARGE NOTE NURSING/CASE MANAGEMENT/SOCIAL WORK - NSDCPEFALRISK_GEN_ALL_CORE
For information on Fall & Injury Prevention, visit: [email or web address] OR  [email or web address] OR  https://www.cdc.gov/steadi/patient.html

## 2023-01-18 NOTE — DISCHARGE NOTE PROVIDER - NSDCMRMEDTOKEN_GEN_ALL_CORE_FT
acetaminophen 325 mg oral tablet: 2 tab(s) orally every 6 hours, As needed, Temp greater or equal to 38C (100.4F), Mild Pain (1 - 3)  apixaban 2.5 mg oral tablet: 1 tab(s) orally 2 times a day  Artificial Tears ophthalmic solution: 1 drop(s) to each affected eye , As Needed  atorvastatin 10 mg oral tablet: 1 tab(s) orally once a day  benzonatate 100 mg oral capsule: 1 cap(s) orally 3 times a day  glipiZIDE 10 mg oral tablet, extended release: 1 tab(s) orally once a day  guaiFENesin 100 mg/5 mL oral liquid: 10 milliliter(s) orally every 6 hours, As needed, Cough  Januvia 100 mg oral tablet: 1 tab(s) orally once a day  metFORMIN 850 mg oral tablet: 1 tab(s) orally in the morning  0.5 tab(s) orally in the evening  Metoprolol Succinate ER 50 mg oral tablet, extended release: 1 tab(s) orally once a day (in the evening)- rhythm control  pantoprazole 20 mg oral delayed release tablet: 1 tab(s) orally once a day  polyethylene glycol 3350 oral powder for reconstitution: 17 gram(s) orally once a day  senna leaf extract oral tablet: 2 tab(s) orally once a day (at bedtime)  sucralfate 1 g oral tablet: 1 tab(s) orally 2 times a day   acetaminophen 325 mg oral tablet: 2 tab(s) orally every 6 hours, As needed, Temp greater or equal to 38C (100.4F), Mild Pain (1 - 3)  apixaban 2.5 mg oral tablet: 1 tab(s) orally 2 times a day  Artificial Tears ophthalmic solution: 1 drop(s) to each affected eye , As Needed  benzonatate 100 mg oral capsule: 1 cap(s) orally 3 times a day  glipiZIDE 10 mg oral tablet, extended release: 1 tab(s) orally once a day  guaiFENesin 100 mg/5 mL oral liquid: 10 milliliter(s) orally every 6 hours, As needed, Cough  Januvia 100 mg oral tablet: 1 tab(s) orally once a day  metFORMIN 850 mg oral tablet: 1 tab(s) orally in the morning  0.5 tab(s) orally in the evening  pantoprazole 20 mg oral delayed release tablet: 1 tab(s) orally once a day  polyethylene glycol 3350 oral powder for reconstitution: 17 gram(s) orally once a day  senna leaf extract oral tablet: 2 tab(s) orally once a day (at bedtime)  sucralfate 1 g oral tablet: 1 tab(s) orally 2 times a day

## 2023-01-18 NOTE — DISCHARGE NOTE PROVIDER - CARE PROVIDER_API CALL
Nay Abel)  Cardiovascular Disease; Interventional Cardiology  [address] [place] [address]  Phone: [phone]  Fax: [phone]  Follow Up Time:

## 2023-01-18 NOTE — DISCHARGE NOTE PROVIDER - CARE PROVIDERS DIRECT ADDRESSES
[email or web address]

## 2023-01-18 NOTE — PROGRESS NOTE ADULT - PROVIDER SPECIALTY LIST ADULT
Infectious Disease
Pulmonology
Pulmonology
Infectious Disease
Internal Medicine
Pulmonology
Internal Medicine
Pulmonology
Pulmonology
Cardiology
Endocrinology
Endocrinology
Cardiology
Endocrinology
Cardiology

## 2023-01-18 NOTE — PROGRESS NOTE ADULT - PROBLEM SELECTOR PROBLEM 2
CAD (coronary artery disease)
Atrial fibrillation
Atrial fibrillation
CAD (coronary artery disease)
Atrial fibrillation
CAD (coronary artery disease)
Atrial fibrillation

## 2023-01-18 NOTE — PROGRESS NOTE ADULT - ASSESSMENT
Assessment  DMT2: 89y Female with DM T2 with hyperglycemia admitted with fever,  A1C is 6.6, patient was on oral hypoglycemic agents at home, on insulin coverage, blood sugars improving.  COVID: On treatment monitored.  CAD: on medications, monitored, asymptomatic.  HTN: On antihypertensive medications, monitored, asymptomatic.              Jovany Pagan MD  Cell:  [phone]  Office: [phone]               Assessment  DMT2: 89y Female with DM T2 with hyperglycemia admitted with fever,  A1C is 6.6, patient was on oral hypoglycemic  agents at home, on insulin coverage, blood sugars improving.  COVID: On treatment monitored.  CAD: on medications, monitored, asymptomatic.  HTN: On antihypertensive medications, monitored, asymptomatic.              Jovany Pagan MD  Cell:  [phone]  Office: [phone]

## 2023-01-18 NOTE — PROGRESS NOTE ADULT - ASSESSMENT
89 yo female with PMHx of CAD, DM, HLD, GERD, atrial thrombus , Afib on Eliquis, CHF, AS p/w fever.  Patient unable to provide history.  Per EMS patient has had fevers, body aches and shortness of breath since yesterday.  Patient lives at home alone with her home health aide.     1/12:  Upon arrival, EMS states patient was sating at 90% on room air with improvement on 2 L nasal cannula. Ptn was admitted with a similar presentation nearly a year ago and was covid pos then as she is now as well. She is DNR/DNI as per her HCP  Will treat w remdesivir, dexamethasone, insulin on a sliding scale, IVF, ID, pulm consults called. dvt ppx not necessary, ptn is on chronic AC w ELiquis    1/13: ptn is dyspneic, has a lot of secretions, remains on BIPAP, seen by PULM, ID, Card, on chroninc AC, DDImer >11K, Procal >1, on steroids, remdesivir, day 2 hospitalization for acute hypoxic respiratory failure 2/2 COvid PNA. ptn is DNR/DNI as per HCP. will add Zosyn, ID in agreement, will add respiratory vest for secretion mobilization    1/14: ptn feels much better, on 4 l O2 NC, high FS while on decadron, ENDO called, pulm and ID following. cont remdesivir, decadron zosyn. check speech and swallow eval, keep on dysphagia 2 diet for now,   1/15: tolerating 2LNC, overall feeling much better  1/16: RA pulse ox 95%, awaiting pulse ox w exertion  1/17: ptn is oxygenating well on RA: 95% at rest, and w exertion. will plan for dc and dc steroids, completing remdesivir today. cleared for DC to [facility] starting tomorrow  1/18: DC to [facility] today

## 2023-01-18 NOTE — PROGRESS NOTE ADULT - PROBLEM SELECTOR PROBLEM 1
Shortness of breath
Diabetes
Diabetes
Shortness of breath
Shortness of breath
Diabetes
Shortness of breath

## 2023-01-18 NOTE — PROGRESS NOTE ADULT - PROBLEM SELECTOR PROBLEM 3
CAD (coronary artery disease)
Shortness of breath
Shortness of breath
CAD (coronary artery disease)
Shortness of breath
CAD (coronary artery disease)

## 2023-01-18 NOTE — PROGRESS NOTE ADULT - ASSESSMENT
89 yo female with PMHx of CAD, DM, HLD, GERD, atrial thrombus , Afib on Eliquis, CHF, AS p/w fever.  Patient unable to provide history.  Per EMS patient has had fevers, body aches and shortness of breath since yesterday.  Patient lives at home alone with her home health aide.  Upon arrival, EMS states patient was sating at 90% on room air with improvement on 2 L nasal cannula. Ptn was admitted with a similar presentation nearly a year ago and was covid pos then as she is now as well. She is DNR/DNI as per her HCP  Will treat w remdesivir, dexamethasone, insulin on a sliding scale, IVF, ID, pulm consults called. dvt ppx w po Xarelto;    Covid infection: / resp failure   A fib  :  CHF:  Aortic stenosis:   DM:   GERD:   cad     1/13/2023      Covid infection: / resp failure  : ON REMDESIVIR and dexa:   A fib  : on eliquis  CHF:  per primary team : she has heart failure:  her legs were very swollen before two weeks ago per her neice  Aortic stenosis: has sever AS:  didb not do any treatment / tavr:    DM:  monitor and control   GERD: PPI  cad  : cont current meds::  on eliquis    dvt propahylaxis    1/14:      Covid infection: / resp failure  : ON REMDESIVIR AND DEXA:  She looks pretty good today  : o n 4 L of oxygen : she is alert and awake and reponds to questions  A fib  : on eliquis  CHF:  per primary team : she has heart failure:  her legs were very swollen before two weeks ago per her neice : not on diuretics at this t mulugeta  Aortic stenosis: has sever AS:  did  not do any treatment    DM:  monitor and control   GERD: PPI  cad  : cont current meds::  on eliquis:  dw acp     dvt prophylaxis    1/15 Wean O2. Bilevel PRN, d/c tomorrow if she doesn't use. Control FS.     1/16:      Covid infection: / resp failure  : ON REMDESIVIR d5/5  AND DEXA : d5/10 :  zosyn was stopped:  dc bipap : check vbg in am    A fib  : on eliquis  CHF:  per primary team : she has heart failure:  her legs were very swollen before two weeks ago per her niece : not on diuretics at this t mulugeta  Aortic stenosis: has severe AS:  did  not do any treatment    DM:  monitor and control   GERD: PPI  cad  : cont current meds::  on eliquis:  cary acp       1/17:    Covid infection: / resp failure  : Ofinished REMDESIVIR d5/5  AND DEXA : d6/10 :  zosyn was stopped:  dc bipap : check vbg in : seems pretty good   dc AVAPS  A fib  : on eliquis  CHF:  per primary team : she has heart failure:  her legs were very swollen before two weeks ago per her niece : not on diuretics at this t mulugeta  Aortic stenosis: has severe AS:  did  not do any treatment    DM:  monitor and control   GERD: PPI  cad  : cont current meds::  on eliquis:  cary acp : dc avaps      1/18:    Covid infection: / resp failure  : finished REMDESIVIR d5/5  AND DEXA : d/10 :  zosyn was stopped:  off oxygen too : ABG today seems very good off bipap  A fib  : on eliquis  CHF:  per primary team : she has heart failure:  her legs were very swollen before two weeks ago per her niece : not on diuretics at this t mulugeta  Aortic stenosis: has severe AS:  did  not do any treatment    DM:  monitor and control   GERD: PPI  cad  : cont current meds::  on eliquis:  cary acp :  89 yo female with PMHx of CAD, DM, HLD, GERD, atrial thrombus , Afib on Eliquis, CHF, AS p/w fever.  Patient unable to provide history.  Per EMS patient has had fevers, body aches and shortness of breath since yesterday.  Patient lives at home alone with her home health aide.  Upon arrival, EMS states patient was sating at 90% on room air with improvement on 2 L nasal cannula. Ptn was admitted with a similar presentation nearly a year ago and was covid pos then as she is now as well. She is DNR/DNI as per her HCP  Will treat w remdesivir, dexamethasone, insulin on a sliding scale, IVF, ID, pulm consults called. dvt ppx w po Xarelto;    Covid infection: / resp failure   A fib  :  CHF:  Aortic stenosis:   DM:   GERD:   cad     1/13/2023      Covid infection: / resp failure  : ON REMDESIVIR and dexa:   A fib  : on eliquis  CHF:  per primary team : she has heart failure:  her legs were very swollen before two weeks ago per her neice  Aortic stenosis: has sever AS:  didb not do any treatment / tavr:    DM:  monitor and control   GERD: PPI  cad  : cont current meds::  on eliquis    dvt propahylaxis    1/14:      Covid infection: / resp failure  : ON REMDESIVIR AND DEXA:  She looks pretty good today  : o n 4 L of oxygen : she is alert and awake and reponds to questions  A fib  : on eliquis  CHF:  per primary team : she has heart failure:  her legs were very swollen before two weeks ago per her neice : not on diuretics at this t mulugeta  Aortic stenosis: has sever AS:  did  not do any treatment    DM:  monitor and control   GERD: PPI  cad  : cont current meds::  on eliquis:  dw acp     dvt prophylaxis    1/15 Wean O2. Bilevel PRN, d/c tomorrow if she doesn't use. Control FS.     1/16:      Covid infection: / resp failure  : ON REMDESIVIR d5/5  AND DEXA : d5/10 :  zosyn was stopped:  dc bipap : check vbg in am    A fib  : on eliquis  CHF:  per primary team : she has heart failure:  her legs were very swollen before two weeks ago per her niece : not on diuretics at this t mulugeta  Aortic stenosis: has severe AS:  did  not do any treatment    DM:  monitor and control   GERD: PPI  cad  : cont current meds::  on eliquis:  cary acp       1/17:    Covid infection: / resp failure  : Ofinished REMDESIVIR d5/5  AND DEXA : d6/10 :  zosyn was stopped:  dc bipap : check vbg in : seems pretty good   dc AVAPS  A fib  : on eliquis  CHF:  per primary team : she has heart failure:  her legs were very swollen before two weeks ago per her niece : not on diuretics at this t mulugeta  Aortic stenosis: has severe AS:  did  not do any treatment    DM:  monitor and control   GERD: PPI  cad  : cont current meds::  on eliquis:  cary acp : dc avaps      1/18:    Covid infection: / resp failure  : finished REMDESIVIR AND DEXA : d7/10 :  zosyn was stopped:  off oxygen too : ABG today seems very good off bipap  A fib  : on eliquis  CHF:  per primary team : she has heart failure:  her legs were very swollen before two weeks ago per her niece : not on diuretics at this t mulugeta  Aortic stenosis: has severe AS:  did  not do any treatment    DM:  monitor and control   GERD: PPI  cad  : cont current meds::  on eliquis:  cary acp :

## 2023-01-18 NOTE — PROGRESS NOTE ADULT - SUBJECTIVE AND OBJECTIVE BOX
Subjective: Patient seen and examined. No new events except as noted.     REVIEW OF SYSTEMS:    CONSTITUTIONAL: + weakness, fevers or chills  EYES/ENT: No visual changes;  No vertigo or throat pain   NECK: No pain or stiffness  RESPIRATORY: No cough, wheezing, hemoptysis; No shortness of breath  CARDIOVASCULAR: No chest pain or palpitations  GASTROINTESTINAL: No abdominal or epigastric pain. No nausea, vomiting, or hematemesis; No diarrhea or constipation. No melena or hematochezia.  GENITOURINARY: No dysuria, frequency or hematuria  NEUROLOGICAL: No numbness or weakness  SKIN: No itching, burning, rashes, or lesions   All other review of systems is negative unless indicated above.    MEDICATIONS:  MEDICATIONS  (STANDING):  apixaban 2.5 milliGRAM(s) Oral every 12 hours  benzonatate 100 milliGRAM(s) Oral three times a day  chlorhexidine 2% Cloths 1 Application(s) Topical <User Schedule>  dexAMETHasone  Injectable 6 milliGRAM(s) IV Push daily  dextrose 5%. 1000 milliLiter(s) (50 mL/Hr) IV Continuous <Continuous>  dextrose 5%. 1000 milliLiter(s) (100 mL/Hr) IV Continuous <Continuous>  dextrose 50% Injectable 25 Gram(s) IV Push once  dextrose 50% Injectable 12.5 Gram(s) IV Push once  dextrose 50% Injectable 25 Gram(s) IV Push once  glucagon  Injectable 1 milliGRAM(s) IntraMuscular once  insulin glargine Injectable (LANTUS) 10 Unit(s) SubCutaneous at bedtime  insulin lispro (ADMELOG) corrective regimen sliding scale   SubCutaneous three times a day before meals  insulin lispro (ADMELOG) corrective regimen sliding scale   SubCutaneous at bedtime  remdesivir  IVPB 100 milliGRAM(s) IV Intermittent every 24 hours  remdesivir  IVPB   IV Intermittent   sodium chloride 0.45%. 1000 milliLiter(s) (60 mL/Hr) IV Continuous <Continuous>    PHYSICAL EXAM:  Vital Signs Last 24 Hrs  T(C): 36.6 (18 Jan 2023 04:44), Max: 36.7 (17 Jan 2023 16:49)  T(F): 97.8 (18 Jan 2023 04:44), Max: 98 (17 Jan 2023 16:49)  HR: 78 (18 Jan 2023 04:44) (78 - 81)  BP: 146/75 (18 Jan 2023 04:44) (136/69 - 146/75)  BP(mean): --  RR: 18 (18 Jan 2023 04:44) (18 - 18)  SpO2: 97% (18 Jan 2023 04:44) (96% - 98%)    Parameters below as of 18 Jan 2023 04:44  Patient On (Oxygen Delivery Method): room air    I&O's Summary    17 Jan 2023 07:01  -  18 Jan 2023 07:00  --------------------------------------------------------  IN: 480 mL / OUT: 900 mL / NET: -420 mL    Appearance: Normal	  HEENT: Normal oral mucosa, PERRL, EOMI	  Lymphatic: No lymphadenopathy , no edema  Cardiovascular: Irregular S1 S2, No JVD, No murmurs , Peripheral pulses palpable 2+ bilaterally  Respiratory: Decreased BS, on RA  Gastrointestinal: Soft, Non-tender, + BS	  Skin: No rashes, No ecchymoses, No cyanosis, warm to touch  Musculoskeletal: Normal range of motion, normal strength  Psychiatry: Mood & affect appropriate  Ext: No edema    LABS:    CARDIAC MARKERS:                        11.5   8.35  )-----------( 199      ( 17 Jan 2023 10:09 )             36.0     01-17    139  |  102  |  23  ----------------------------<  164<H>  4.1   |  29  |  0.71    Ca    9.2      17 Jan 2023 10:09    proBNP:   Lipid Profile:   HgA1c:   TSH:     TELEMETRY: Afib 60-90  ECG:  	  RADIOLOGY:   DIAGNOSTIC TESTING:  [ ] Echocardiogram:  [ ]  Catheterization:  [ ] Stress Test:    OTHER:

## 2023-01-18 NOTE — PROGRESS NOTE ADULT - ASSESSMENT
89 y/o F PMHx CAD, DM, HLD, GERD, atrial thrombus, Afib on Eliquis, CHF, AS who presented with fever, SOB.     sepsis 2/2 COVID  acute hypoxic resp failure- resolved  SARS-CoV-2 PCR positive  CXR b/l hazy opacities  CT chest c/f pulm edema, zosyn discontinued  leukocytosis resolved  s/p remdesivir x 5 day course, completed 1/16  decadron discontinued yesterday as patient planned for discharge  trend inflammatory markers  on eliquis for Afib  weaned off O2  isolation per infection control policy   discharge planning- RIGOBERTO Mckenzie M.D.  [facility], Division of Infectious Diseases  [phone]  After 5pm on weekdays and all day on weekends - please call [phone]

## 2023-01-18 NOTE — DISCHARGE NOTE PROVIDER - NSDCCPCAREPLAN_GEN_ALL_CORE_FT
PRINCIPAL DISCHARGE DIAGNOSIS  Diagnosis: Sepsis, unspecified organism  Assessment and Plan of Treatment:       SECONDARY DISCHARGE DIAGNOSES  Diagnosis: Pneumonia  Assessment and Plan of Treatment:     Diagnosis: Atrial fibrillation  Assessment and Plan of Treatment:      PRINCIPAL DISCHARGE DIAGNOSIS  Diagnosis: 2019 novel coronavirus disease (COVID-19)  Assessment and Plan of Treatment: You tested positive for COVID 19.  You no longer require hospitalization.    Completed course of Remdesivir and Decadron  Ok to discontinue isolation 10 days from onset of Covid symtoms and no fever for 24 hours without use of Tylenol/Motrin.  Please restrict activities outside of your home except for getting medical care.  Do not go to work, school, or public areas.  Avoid using public transportation, ride-sharing, or taxis.  Separate yourself from other people and animals in your home.  Call ahead before visiting your doctor.  Wear a facemask when you are around other people. Cover your cough and sneezes.  Clean your hands often.  Avoid sharing personal household items.  Clean all frequently touched surfaces daily.        SECONDARY DISCHARGE DIAGNOSES  Diagnosis: Sepsis  Assessment and Plan of Treatment: Sepsis due to Covid, now resolved    Diagnosis: Atrial fibrillation  Assessment and Plan of Treatment: Metoprolol stopped due to bradycardia  Atrial fibrillation is the most common heart rhythm problem.  The condition puts you at risk for has stroke and heart attack  It helps if you control your blood pressure, not drink more than 1-2 alcohol drinks per day, cut down on caffeine, getting treatment for over active thyroid gland, and get regular exercise  Call your doctor if you feel your heart racing or beating unusually, chest tightness or pain, lightheaded, faint, shortness of breath especially with exercise  It is important to take your heart medication as prescribed  You may be on anticoagulation which is very important to take as directed - you may need blood work to monitor drug levels      Diagnosis: Diabetes  Assessment and Plan of Treatment: Make sure you get your HgA1c checked every three months.  If you take oral diabetes medications, check your blood glucose two times a day.  If you take insulin, check your blood glucose before meals and at bedtime.  It's important not to skip any meals.  Keep a log of your blood glucose results and always take it with you to your doctor appointments.  Keep a list of your current medications including injectables and over the counter medications and bring this medication list with you to all your doctor appointments.  If you have not seen your ophthalmologist this year call for appointment.  Check your feet daily for redness, sores, or openings. Do not self treat. If no improvement in two days call your primary care physician for an appointment.  Low blood sugar (hypoglycemia) is a blood sugar below 70mg/dl. Check your blood sugar if you feel signs/symptoms of hypoglycemia. If your blood sugar is below 70 take 15 grams of carbohydrates (ex 4 oz of apple juice, 3-4 glucose tablets, or 4-6 oz of regular soda) wait 15 minutes and repeat blood sugar to make sure it comes up above 70.  If your blood sugar is above 70 and you are due for a meal, have a meal.  If you are not due for a meal have a snack.  This snack helps keeps your blood sugar at a safe range.

## 2023-01-18 NOTE — PROGRESS NOTE ADULT - SUBJECTIVE AND OBJECTIVE BOX
[facility], Division of Infectious Diseases  ALINA Mcneil Y. Patel, S. Shah, G. [facility]  [phone]  ([phone] - weekdays after 5pm and weekends)    Name: SONYA MYERS  Age/Gender: 89y Female  MRN: [id number]    Interval History:  Patient seen this morning.   Notes reviewed.   No concerning overnight events.  Afebrile.     Allergies: No Known Allergies      Objective:  Vitals:   T(F): 97.8 (01-18-23 @ 04:44), Max: 98 (01-17-23 @ 16:49)  HR: 78 (01-18-23 @ 04:44) (78 - 81)  BP: 146/75 (01-18-23 @ 04:44) (136/69 - 146/75)  RR: 18 (01-18-23 @ 04:44) (18 - 18)  SpO2: 97% (01-18-23 @ 04:44) (96% - 98%)  Physical Examination:  General: no acute distress  HEENT: anicteric  Cardio: S1, S2, normal rate  Resp: clear to auscultation anteriorly  Abd: soft, NT, ND  Ext: no edema  Skin: warm, dry  Psych: flat affect    Laboratory Studies:  CBC:                       11.5   8.35  )-----------( 199      ( 17 Jan 2023 10:09 )             36.0     WBC Trend:  8.35 01-17-23 @ 10:09  8.60 01-13-23 @ 11:35  11.79 01-12-23 @ 10:21    CMP: 01-17    139  |  102  |  23  ----------------------------<  164<H>  4.1   |  29  |  0.71    Ca    9.2      17 Jan 2023 10:09              Microbiology: reviewed     Culture - Urine (collected 01-12-23 @ 10:32)  Source: Clean Catch Clean Catch (Midstream)  Final Report (01-13-23 @ 11:01):    No growth    Culture - Blood (collected 01-12-23 @ 09:50)  Source: .Blood Blood-Peripheral  Final Report (01-17-23 @ 15:00):    No Growth Final    Culture - Blood (collected 01-12-23 @ 09:35)  Source: .Blood Blood-Peripheral  Final Report (01-17-23 @ 15:00):    No Growth Final        Radiology: reviewed     Medications:  acetaminophen     Tablet .. 650 milliGRAM(s) Oral every 6 hours PRN  apixaban 2.5 milliGRAM(s) Oral every 12 hours  benzonatate 100 milliGRAM(s) Oral three times a day  chlorhexidine 2% Cloths 1 Application(s) Topical <User Schedule>  dextrose 5%. 1000 milliLiter(s) IV Continuous <Continuous>  dextrose 5%. 1000 milliLiter(s) IV Continuous <Continuous>  dextrose 50% Injectable 25 Gram(s) IV Push once  dextrose 50% Injectable 12.5 Gram(s) IV Push once  dextrose 50% Injectable 25 Gram(s) IV Push once  dextrose Oral Gel 15 Gram(s) Oral once PRN  glucagon  Injectable 1 milliGRAM(s) IntraMuscular once  guaiFENesin Oral Liquid (Sugar-Free) 200 milliGRAM(s) Oral every 6 hours PRN  insulin glargine Injectable (LANTUS) 10 Unit(s) SubCutaneous at bedtime  insulin lispro (ADMELOG) corrective regimen sliding scale   SubCutaneous three times a day before meals  insulin lispro (ADMELOG) corrective regimen sliding scale   SubCutaneous at bedtime  insulin lispro Injectable (ADMELOG) 5 Unit(s) SubCutaneous three times a day before meals  polyethylene glycol 3350 17 Gram(s) Oral daily  senna 2 Tablet(s) Oral at bedtime    Antimicrobials:

## 2023-01-18 NOTE — PROGRESS NOTE ADULT - PROBLEM SELECTOR PLAN 1
w/ fevers    - COVID +  remdesivir and decadron started  titrate/wean supplemental O2 as needed
w/ fevers    - COVID +  remdesivir and decadron  titrate/wean supplemental O2 as needed
Will continue current insulin regimen for now. Will continue monitoring  blood sugars, will Follow up.  Patient counseled for compliance with consistent low carb diet.
w/ fevers    - COVID +  remdesivir and decadron started  on biPAP  titrate/wean supplemental O2 as needed.
w/ fevers    - COVID +  remdesivir and decadron - completed  titrate/wean supplemental O2 as needed
Will continue current insulin regimen for now. Will continue monitoring  blood sugars, will Follow up.  Patient counseled for compliance with consistent low carb diet.  .
Will continue current insulin regimen for now. Will continue monitoring  blood sugars, will Follow up.  Patient counseled for compliance with consistent low carb diet.  .
w/ fevers    - COVID +  remdesivir and decadron  titrate/wean supplemental O2 as needed
w/ fevers    - COVID +  remdesivir and decadron started  titrate/wean supplemental O2 as needed

## 2023-01-18 NOTE — PROGRESS NOTE ADULT - SUBJECTIVE AND OBJECTIVE BOX
Chief complaint  Patient is a 89y old  Female who presents with a chief complaint of Covid (18 Jan 2023 17:26)   Review of systems  Patient in bedside chair , looks comfortable. Isolation maintained.     Labs and Fingersticks  CAPILLARY BLOOD GLUCOSE      POCT Blood Glucose.: 94 mg/dL (18 Jan 2023 16:56)  POCT Blood Glucose.: 85 mg/dL (18 Jan 2023 11:31)  POCT Blood Glucose.: 142 mg/dL (18 Jan 2023 07:42)  POCT Blood Glucose.: 246 mg/dL (17 Jan 2023 21:51)      Anion Gap, Serum: 8 (01-17 @ 10:09)      Calcium, Total Serum: 9.2 (01-17 @ 10:09)          01-17    139  |  102  |  23  ----------------------------<  164<H>  4.1   |  29  |  0.71    Ca    9.2      17 Jan 2023 10:09                          11.5   8.35  )-----------( 199      ( 17 Jan 2023 10:09 )             36.0     Medications  MEDICATIONS  (STANDING):  apixaban 2.5 milliGRAM(s) Oral every 12 hours  benzonatate 100 milliGRAM(s) Oral three times a day  chlorhexidine 2% Cloths 1 Application(s) Topical <User Schedule>  dextrose 5%. 1000 milliLiter(s) (50 mL/Hr) IV Continuous <Continuous>  dextrose 5%. 1000 milliLiter(s) (100 mL/Hr) IV Continuous <Continuous>  dextrose 50% Injectable 25 Gram(s) IV Push once  dextrose 50% Injectable 12.5 Gram(s) IV Push once  dextrose 50% Injectable 25 Gram(s) IV Push once  glucagon  Injectable 1 milliGRAM(s) IntraMuscular once  insulin glargine Injectable (LANTUS) 10 Unit(s) SubCutaneous at bedtime  insulin lispro (ADMELOG) corrective regimen sliding scale   SubCutaneous three times a day before meals  insulin lispro (ADMELOG) corrective regimen sliding scale   SubCutaneous at bedtime  insulin lispro Injectable (ADMELOG) 5 Unit(s) SubCutaneous three times a day before meals  polyethylene glycol 3350 17 Gram(s) Oral daily  senna 2 Tablet(s) Oral at bedtime      Physical Exam  General: Patient comfortable in bedside chair   Vital Signs Last 12 Hrs  T(F): 97.9 (01-18-23 @ 11:17), Max: 97.9 (01-18-23 @ 11:17)  HR: 87 (01-18-23 @ 17:12) (77 - 87)  BP: 142/88 (01-18-23 @ 17:12) (134/68 - 152/73)  BP(mean): --  RR: 17 (01-18-23 @ 17:12) (17 - 18)  SpO2: 96% (01-18-23 @ 17:12) (96% - 98%)  Neck: No palpable thyroid nodules.  CVS: S1S2, No murmurs  Respiratory: No wheezing, no crepitations  GI: Abdomen soft, bowel sounds positive  Musculoskeletal:  edema lower extremities.   Skin: No skin rashes, no ecchymosis           Chief complaint  Patient is a 89y old  Female who presents with a chief complaint of Covid (18 Jan 2023 17:26)   Review of systems  Patient in bedside chair , looks comfortable. Isolation maintained.     Labs and Fingersticks  CAPILLARY BLOOD GLUCOSE      POCT Blood Glucose.: 94 mg/dL (18 Jan 2023 16:56)  POCT Blood Glucose.: 85 mg/dL (18 Jan 2023 11:31)  POCT Blood Glucose.: 142 mg/dL (18 Jan 2023 07:42)  POCT Blood Glucose.: 246 mg/dL (17 Jan 2023 21:51)      Anion Gap, Serum: 8 (01-17 @ 10:09)      Calcium, Total Serum: 9.2 (01-17 @ 10:09)          01-17    139  |  102  |  23  ----------------------------<  164<H>  4.1   |  29  |  0.71    Ca    9.2      17 Jan 2023 10:09                          11.5   8.35  )-----------( 199      ( 17 Jan 2023 10:09 )             36.0     Medications  MEDICATIONS  (STANDING):  apixaban 2.5 milliGRAM(s) Oral every 12 hours  benzonatate 100 milliGRAM(s) Oral three times a day  chlorhexidine 2% Cloths 1 Application(s) Topical <User Schedule>  dextrose 5%. 1000 milliLiter(s) (50 mL/Hr) IV Continuous <Continuous>  dextrose 5%. 1000 milliLiter(s) (100 mL/Hr) IV Continuous <Continuous>  dextrose 50% Injectable 25 Gram(s) IV Push once  dextrose 50% Injectable 12.5 Gram(s) IV Push once  dextrose 50% Injectable 25 Gram(s) IV Push once  glucagon  Injectable 1 milliGRAM(s) IntraMuscular once  insulin glargine Injectable (LANTUS) 10 Unit(s) SubCutaneous at bedtime  insulin lispro (ADMELOG) corrective regimen sliding scale   SubCutaneous three times a day before meals  insulin lispro (ADMELOG) corrective regimen sliding scale   SubCutaneous at bedtime  insulin lispro Injectable (ADMELOG) 5 Unit(s) SubCutaneous three times a day before meals  polyethylene glycol 3350 17 Gram(s) Oral daily  senna 2 Tablet(s) Oral at bedtime      Physical Exam  General: Patient comfortable in bedside chair   Vital Signs Last 12 Hrs  T(F): 97.9 (01-18-23 @ 11:17), Max: 97.9 (01-18-23 @ 11:17)  HR: 87 (01-18-23 @ 17:12) (77 - 87)  BP: 142/88 (01-18-23 @ 17:12) (134/68 - 152/73)  BP(mean): --  RR: 17 (01-18-23 @ 17:12) (17 - 18)  SpO2: 96% (01-18-23 @ 17:12) (96% - 98%)  Neck: No palpable thyroid nodules.  CVS: S1S2, No murmurs  Respiratory: No wheezing, no crepitations  GI: Abdomen soft, bowel sounds positive  Musculoskeletal:  edema lower extremities.   Skin: No skin rashes, no ecchymosis

## 2023-01-18 NOTE — DISCHARGE NOTE NURSING/CASE MANAGEMENT/SOCIAL WORK - NSDCFUADDAPPT_GEN_ALL_CORE_FT
Please follow up with primary care provider upon discharge from rehab.    APPTS ARE READY TO BE MADE: [x] YES    Best Family or Patient Contact (if needed):    Additional Information about above appointments (if needed):    1:   2:   3:     Other comments or requests:

## 2023-01-18 NOTE — PROGRESS NOTE ADULT - SUBJECTIVE AND OBJECTIVE BOX
Date of Service: 01-18-23 @ 14:04    Patient is a 89y old  Female who presents with a chief complaint of resp failure (13 Jan 2023 18:37)      Any change in ROS: seems OK: no sob:  no cough :   on rooma ir:     MEDICATIONS  (STANDING):  apixaban 2.5 milliGRAM(s) Oral every 12 hours  benzonatate 100 milliGRAM(s) Oral three times a day  chlorhexidine 2% Cloths 1 Application(s) Topical <User Schedule>  dextrose 5%. 1000 milliLiter(s) (50 mL/Hr) IV Continuous <Continuous>  dextrose 5%. 1000 milliLiter(s) (100 mL/Hr) IV Continuous <Continuous>  dextrose 50% Injectable 25 Gram(s) IV Push once  dextrose 50% Injectable 12.5 Gram(s) IV Push once  dextrose 50% Injectable 25 Gram(s) IV Push once  glucagon  Injectable 1 milliGRAM(s) IntraMuscular once  insulin glargine Injectable (LANTUS) 10 Unit(s) SubCutaneous at bedtime  insulin lispro (ADMELOG) corrective regimen sliding scale   SubCutaneous three times a day before meals  insulin lispro (ADMELOG) corrective regimen sliding scale   SubCutaneous at bedtime  insulin lispro Injectable (ADMELOG) 5 Unit(s) SubCutaneous three times a day before meals  polyethylene glycol 3350 17 Gram(s) Oral daily  senna 2 Tablet(s) Oral at bedtime    MEDICATIONS  (PRN):  acetaminophen     Tablet .. 650 milliGRAM(s) Oral every 6 hours PRN Temp greater or equal to 38C (100.4F), Mild Pain (1 - 3)  dextrose Oral Gel 15 Gram(s) Oral once PRN Blood Glucose LESS THAN 70 milliGRAM(s)/deciliter  guaiFENesin Oral Liquid (Sugar-Free) 200 milliGRAM(s) Oral every 6 hours PRN Cough    Vital Signs Last 24 Hrs  T(C): 36.6 (18 Jan 2023 11:17), Max: 36.7 (17 Jan 2023 16:49)  T(F): 97.9 (18 Jan 2023 11:17), Max: 98 (17 Jan 2023 16:49)  HR: 77 (18 Jan 2023 11:17) (77 - 81)  BP: 134/68 (18 Jan 2023 11:17) (134/68 - 146/75)  BP(mean): --  RR: 18 (18 Jan 2023 11:17) (18 - 18)  SpO2: 97% (18 Jan 2023 11:17) (97% - 97%)    Parameters below as of 18 Jan 2023 11:17  Patient On (Oxygen Delivery Method): room air        I&O's Summary    17 Jan 2023 07:01  -  18 Jan 2023 07:00  --------------------------------------------------------  IN: 480 mL / OUT: 900 mL / NET: -420 mL    18 Jan 2023 07:01  -  18 Jan 2023 14:04  --------------------------------------------------------  IN: 480 mL / OUT: 900 mL / NET: -420 mL          Physical Exam:   GENERAL: NAD, well-groomed, well-developed  HEENT: LORNA/   Atraumatic, Normocephalic  ENMT: No tonsillar erythema, exudates, or enlargement; Moist mucous membranes, Good dentition, No lesions  NECK: Supple, No JVD, Normal thyroid  CHEST/LUNG: Clear to auscultaion, ; No rales, rhonchi, wheezing, or rubs  CVS: Regular rate and rhythm; No murmurs, rubs, or gallops  GI: : Soft, Nontender, Nondistended; Bowel sounds present  NERVOUS SYSTEM:  Alert & awake and is on room air:   EXTREMITIES:  - edema  LYMPH: No lymphadenopathy noted  SKIN: No rashes or lesions  ENDOCRINOLOGY: No Thyromegaly  PSYCH: Appropriate    Labs:  ABG - ( 18 Jan 2023 06:30 )  pH, Arterial: 7.49  pH, Blood: x     /  pCO2: 38    /  pO2: 95    / HCO3: 29    / Base Excess: 5.4   /  SaO2: 99.7            32  CARDIAC MARKERS ( 17 Jan 2023 06:05 )  x     / x     / 20 U/L / x     / x                                11.5   8.35  )-----------( 199      ( 17 Jan 2023 10:09 )             36.0     01-17    139  |  102  |  23  ----------------------------<  164<H>  4.1   |  29  |  0.71    Ca    9.2      17 Jan 2023 10:09      CAPILLARY BLOOD GLUCOSE      POCT Blood Glucose.: 85 mg/dL (18 Jan 2023 11:31)  POCT Blood Glucose.: 142 mg/dL (18 Jan 2023 07:42)  POCT Blood Glucose.: 246 mg/dL (17 Jan 2023 21:51)  POCT Blood Glucose.: 158 mg/dL (17 Jan 2023 16:26)            D-Dimer Assay, Quantitative: 282 ng/mL DDU (01-16 @ 06:52)  D-Dimer Assay, Quantitative: 395 ng/mL DDU (01-13 @ 11:35)  Serum Pro-Brain Natriuretic Peptide: 04042 pg/mL (01-16 @ 06:54)        RECENT CULTURES:  01-12 @ 10:32 Clean Catch Clean Catch (Midstream)   < from: CT Chest No Cont (01.14.23 @ 15:14) >    ACC: [id number] EXAM:  CT CHEST                          PROCEDURE DATE:  01/14/2023          INTERPRETATION:  CLINICAL INFORMATION: COVID, sepsis. Hypoxia.    COMPARISON: None. CT abdomen and pelvis from 11/19/2019.    CONTRAST/COMPLICATIONS:  IV Contrast: None.  Oral Contrast: None.  Complications: None documented.    PROCEDURE:  CT scan of the chest was obtained without intravenous contrast.    FINDINGS:    LYMPH NODES: No lymphadenopathy.    HEART/VASCULATURE: Cardiomegaly. No pericardial effusion. The aorta is   normal in caliber.  There are aortic, aortic valve and coronary artery   calcifications.    AIRWAYS/LUNGS/PLEURA: Scattered bilateral patchy groundglass opacities.   Bilateral interlobular septal thickening. Trace bilateral pleural   effusions, right greater than left..    UPPER ABDOMEN: Grossly unremarkable..    BONES/SOFT TISSUES: Degenerative changes.    IMPRESSION:  Intralobular septal thickening, cardiomegaly, scattered ground glass   opacities with pleural effusions, suggestive of pulmonary edema.    --- End of Report ---           SUKH OLGUIN MD; Resident Radiologist  This document has been electronically signed.  SHANIQUA HERNANDEZ MD; Attending Radiologist  This document has been electronically signed. Jan 14 2023  6:49PM    < end of copied text >               No growth    01-12 @ 09:50 .Blood Blood-Peripheral                No Growth Final    01-12 @ 09:35 .Blood Blood-Peripheral                No Growth Final          RESPIRATORY CULTURES:          Studies  Chest X-RAY  CT SCAN Chest   Venous Dopplers: LE:   CT Abdomen  Others

## 2023-01-18 NOTE — PROGRESS NOTE ADULT - SUBJECTIVE AND OBJECTIVE BOX
Patient is a 89y old  Female who presents with a chief complaint of covid (18 Jan 2023 19:07)      SUBJECTIVE / OVERNIGHT EVENTS: no new events, awaiting DC today    MEDICATIONS  (STANDING):  apixaban 2.5 milliGRAM(s) Oral every 12 hours  benzonatate 100 milliGRAM(s) Oral three times a day  chlorhexidine 2% Cloths 1 Application(s) Topical <User Schedule>  dextrose 5%. 1000 milliLiter(s) (50 mL/Hr) IV Continuous <Continuous>  dextrose 5%. 1000 milliLiter(s) (100 mL/Hr) IV Continuous <Continuous>  dextrose 50% Injectable 25 Gram(s) IV Push once  dextrose 50% Injectable 25 Gram(s) IV Push once  dextrose 50% Injectable 12.5 Gram(s) IV Push once  glucagon  Injectable 1 milliGRAM(s) IntraMuscular once  insulin glargine Injectable (LANTUS) 10 Unit(s) SubCutaneous at bedtime  insulin lispro (ADMELOG) corrective regimen sliding scale   SubCutaneous three times a day before meals  insulin lispro (ADMELOG) corrective regimen sliding scale   SubCutaneous at bedtime  insulin lispro Injectable (ADMELOG) 5 Unit(s) SubCutaneous three times a day before meals  polyethylene glycol 3350 17 Gram(s) Oral daily  senna 2 Tablet(s) Oral at bedtime    MEDICATIONS  (PRN):  acetaminophen     Tablet .. 650 milliGRAM(s) Oral every 6 hours PRN Temp greater or equal to 38C (100.4F), Mild Pain (1 - 3)  dextrose Oral Gel 15 Gram(s) Oral once PRN Blood Glucose LESS THAN 70 milliGRAM(s)/deciliter  guaiFENesin Oral Liquid (Sugar-Free) 200 milliGRAM(s) Oral every 6 hours PRN Cough      Vital Signs Last 24 Hrs  T(F): 97.9 (01-18-23 @ 11:17), Max: 97.9 (01-18-23 @ 11:17)  HR: 87 (01-18-23 @ 17:12) (77 - 87)  BP: 142/88 (01-18-23 @ 17:12) (134/68 - 152/73)  RR: 17 (01-18-23 @ 17:12) (17 - 18)  SpO2: 96% (01-18-23 @ 17:12) (96% - 98%)  Telemetry:   CAPILLARY BLOOD GLUCOSE      POCT Blood Glucose.: 94 mg/dL (18 Jan 2023 16:56)  POCT Blood Glucose.: 85 mg/dL (18 Jan 2023 11:31)  POCT Blood Glucose.: 142 mg/dL (18 Jan 2023 07:42)  POCT Blood Glucose.: 246 mg/dL (17 Jan 2023 21:51)    I&O's Summary    17 Jan 2023 07:01  -  18 Jan 2023 07:00  --------------------------------------------------------  IN: 480 mL / OUT: 900 mL / NET: -420 mL    18 Jan 2023 07:01  -  18 Jan 2023 21:03  --------------------------------------------------------  IN: 480 mL / OUT: 900 mL / NET: -420 mL        PHYSICAL EXAM:  GENERAL: NAD, well-developed  HEAD:  Atraumatic, Normocephalic  EYES: EOMI, PERRLA, conjunctiva and sclera clear  NECK: Supple, No JVD  CHEST/LUNG: Clear to auscultation bilaterally; No wheeze  HEART: Regular rate and rhythm; No murmurs, rubs, or gallops  ABDOMEN: Soft, Nontender, Nondistended; Bowel sounds present  EXTREMITIES:  2+ Peripheral Pulses, No clubbing, cyanosis, or edema  PSYCH: AAOx3  NEUROLOGY: non-focal  SKIN: No rashes or lesions    LABS:                        11.5   8.35  )-----------( 199      ( 17 Jan 2023 10:09 )             36.0     01-17    139  |  102  |  23  ----------------------------<  164<H>  4.1   |  29  |  0.71    Ca    9.2      17 Jan 2023 10:09        CARDIAC MARKERS ( 17 Jan 2023 06:05 )  x     / x     / 20 U/L / x     / x              RADIOLOGY & ADDITIONAL TESTS:    Imaging Personally Reviewed:    Consultant(s) Notes Reviewed:      Care Discussed with Consultants/Other Providers:

## 2023-01-18 NOTE — PROGRESS NOTE ADULT - PROBLEM SELECTOR PLAN 3
Suggest to continue medications, monitoring, FU primary team recommendations. .
stable
Suggest to continue medications, monitoring, FU primary team recommendations.   s/p COVID treatment
Stable   ASA
stable
Suggest to continue medications, monitoring, FU primary team recommendations. .
stable

## 2023-01-18 NOTE — DISCHARGE NOTE NURSING/CASE MANAGEMENT/SOCIAL WORK - PATIENT PORTAL LINK FT
You can access the FollowMyHealth Patient Portal offered by [facility] by registering at the following website: [email or web address]. By joining [facility]’s FollowMyHealth portal, you will also be able to view your health information using other applications (apps) compatible with our system.

## 2023-01-19 ENCOUNTER — TRANSCRIPTION ENCOUNTER (OUTPATIENT)
Age: 88
End: 2023-01-19

## 2023-02-15 ENCOUNTER — TRANSCRIPTION ENCOUNTER (OUTPATIENT)
Age: 88
End: 2023-02-15

## 2023-09-12 NOTE — OCCUPATIONAL THERAPY INITIAL EVALUATION ADULT - THERAPY FREQUENCY, OT EVAL
9/12/2023       Maddi Fernandez  [address] [place] [address] [address] [place] [address]      Dear Maddi mcadams,    We are sorry we missed you on 9/12/2023.  Our records indicate that you were unable to keep a scheduled appointment with me.    We want to take care of the healthcare needs of all of our patients. Canceling an appointment within 24 hours notice allows other patients the opportunity to be seen. A missed appointment translates into a time unavailable to other patients.    Your health care is important to us. Please call our office at [phone] at your earliest convenience to reschedule your appointment or to inform us of any possible scheduling errors.    [facility] requests that patients notify the appointment desk of cancellations as early as possible prior to a scheduled appointment time.    We look forward to hearing from you soon.      Sincerely,          Jessica Burden M.D.  [facility] [place]  [address], [address]  [place], [place] [id number]   1-2x/week

## 2023-09-26 NOTE — PROGRESS NOTE ADULT - ASSESSMENT
Physical Therapy Discharge    Date: 9/26/2023  Total Number of Visits: 3  Referred by: Aysha Elias NP  Medical Diagnosis (from order):   Diagnosis Information      Diagnosis    R26.89 (ICD-10-CM) - Stumbling gait    M21.372 (ICD-10-CM) - Left foot drop    R20.2 (ICD-10-CM) - Paresthesia of left leg                Please see below daily treatment note for last known status.  Status of goals: per status in last daily treatment note       Not Applicable/Other     89 yo female with PMHx of CAD, DM, HLD, GERD, atrial thrombus , Afib on Eliquis, CHF, AS p/w fever.  Patient unable to provide history.  Per EMS patient has had fevers, body aches and shortness of breath since yesterday.  Patient lives at home alone with her home health aide.  Upon arrival, EMS states patient was sating at 90% on room air with improvement on 2 L nasal cannula. Ptn was admitted with a similar presentation nearly a year ago and was covid pos then as she is now as well.

## 2023-10-10 NOTE — ED ADULT TRIAGE NOTE - ISOLATION INDICATION AIRBORNE CONTACT
Started 0.9NS at 100ml/hr per MD verbal order at bedside while we wait for other fluids from pharmacy. Other Specify

## 2023-12-31 NOTE — OCCUPATIONAL THERAPY INITIAL EVALUATION ADULT - LIVES WITH, PROFILE
Case was discussed with  and the patient's admission status was agreed to be Admission Status: observation status to the service of Dr. Santacruz   Pt lives in a house alone, HHA 6hrs x 7 days to assist with ADL, 3 GILL. PTA ambulated with RW, required some assist for ADL. Owns w/c, cane, RW, shower chair, and raised toilet seat.

## 2024-10-25 NOTE — DISCHARGE NOTE PROVIDER - HOSPITAL COURSE
none 89 y/o F PMHx CAD, DM, HLD, GERD, atrial thrombus, Afib on Eliquis, CHF, AS who presented with fever, SOB.     sepsis 2/2 COVID  acute hypoxic resp failure- resolved  SARS-CoV-2 PCR positive  CXR b/l hazy opacities  CT chest c/f pulm edema, zosyn discontinued  leukocytosis resolved  s/p remdesivir x 5 day course, completed 1/16  decadron discontinued yesterday as patient planned for discharge  trend inflammatory markers  on eliquis for Afib  weaned off O2  isolation per infection control policy   discharge planning- RIGOBERTO       89 yo female with PMHx of CAD, DM, HLD, GERD, atrial thrombus , Afib on Eliquis, CHF, AS p/w fever.  Patient unable to provide history.  Per EMS patient has had fevers, body aches and shortness of breath since yesterday.  Patient lives at home alone with her home health aide. Pt found to be Covid +, pulm and ID consulted.    sepsis 2/2 COVID  acute hypoxic resp failure requiring Bipap now on RA- resolved  SARS-CoV-2 PCR positive  CXR b/l hazy opacities  CT chest c/f pulm edema, zosyn discontinued  leukocytosis resolved  s/p remdesivir x 5 day course, completed 1/16  Endocrine consulted for steroid induce hyperglycemia  decadron discontinued yesterday as patient planned for discharge  trend inflammatory markers  on Eliquis for Afib, BB stopped 2/2 bradycardia  weaned off O2  isolation per infection control policy